# Patient Record
Sex: FEMALE | Race: WHITE | Employment: PART TIME | URBAN - NONMETROPOLITAN AREA
[De-identification: names, ages, dates, MRNs, and addresses within clinical notes are randomized per-mention and may not be internally consistent; named-entity substitution may affect disease eponyms.]

---

## 2017-01-30 RX ORDER — LISINOPRIL 20 MG/1
TABLET ORAL
Qty: 90 TABLET | Refills: 2 | Status: SHIPPED | OUTPATIENT
Start: 2017-01-30 | End: 2018-06-07 | Stop reason: SDUPTHER

## 2017-02-27 RX ORDER — OMEPRAZOLE 20 MG/1
CAPSULE, DELAYED RELEASE ORAL
Qty: 90 CAPSULE | Refills: 0 | Status: SHIPPED | OUTPATIENT
Start: 2017-02-27 | End: 2017-05-26 | Stop reason: SDUPTHER

## 2017-03-22 LAB
ABSOLUTE EOS #: 0.1 K/UL (ref 0–0.4)
ABSOLUTE LYMPH #: 2.4 K/UL (ref 1–4.8)
ABSOLUTE MONO #: 0.4 K/UL (ref 0.1–1.2)
ALBUMIN SERPL-MCNC: 4.5 G/DL (ref 3.5–5.2)
ALBUMIN/GLOBULIN RATIO: 1.6 (ref 1–2.5)
ALP BLD-CCNC: 169 U/L (ref 35–104)
ALT SERPL-CCNC: 29 U/L (ref 5–33)
ANION GAP SERPL CALCULATED.3IONS-SCNC: 18 MMOL/L (ref 9–17)
AST SERPL-CCNC: 21 U/L
BASOPHILS # BLD: 1 % (ref 0–2)
BASOPHILS ABSOLUTE: 0 K/UL (ref 0–0.2)
BILIRUB SERPL-MCNC: 0.26 MG/DL (ref 0.3–1.2)
BUN BLDV-MCNC: 13 MG/DL (ref 8–23)
BUN/CREAT BLD: 11 (ref 9–20)
CALCIUM SERPL-MCNC: 9.2 MG/DL (ref 8.6–10.4)
CARCINOEMBRYONIC ANTIGEN: 1.6 NG/ML
CHLORIDE BLD-SCNC: 97 MMOL/L (ref 98–107)
CO2: 24 MMOL/L (ref 20–31)
CREAT SERPL-MCNC: 1.2 MG/DL (ref 0.5–0.9)
DIFFERENTIAL TYPE: ABNORMAL
EOSINOPHILS RELATIVE PERCENT: 1 % (ref 1–4)
GFR AFRICAN AMERICAN: 55 ML/MIN
GFR NON-AFRICAN AMERICAN: 46 ML/MIN
GFR SERPL CREATININE-BSD FRML MDRD: ABNORMAL ML/MIN/{1.73_M2}
GFR SERPL CREATININE-BSD FRML MDRD: ABNORMAL ML/MIN/{1.73_M2}
GLUCOSE BLD-MCNC: 458 MG/DL (ref 70–99)
HCT VFR BLD CALC: 43.2 % (ref 36–46)
HEMOGLOBIN: 14.4 G/DL (ref 12–16)
LYMPHOCYTES # BLD: 30 % (ref 24–44)
MCH RBC QN AUTO: 26.9 PG (ref 26–34)
MCHC RBC AUTO-ENTMCNC: 33.3 G/DL (ref 31–37)
MCV RBC AUTO: 80.9 FL (ref 80–100)
MONOCYTES # BLD: 6 % (ref 1–7)
PDW BLD-RTO: 13.3 % (ref 11–14.5)
PLATELET # BLD: 251 K/UL (ref 140–450)
PLATELET ESTIMATE: ABNORMAL
PMV BLD AUTO: 8 FL (ref 6–12)
POTASSIUM SERPL-SCNC: 4.1 MMOL/L (ref 3.7–5.3)
RBC # BLD: 5.35 M/UL (ref 4–5.2)
RBC # BLD: ABNORMAL 10*6/UL
SEG NEUTROPHILS: 62 % (ref 36–66)
SEGMENTED NEUTROPHILS ABSOLUTE COUNT: 4.9 K/UL (ref 1.8–7.7)
SODIUM BLD-SCNC: 139 MMOL/L (ref 135–144)
TOTAL PROTEIN: 7.3 G/DL (ref 6.4–8.3)
WBC # BLD: 7.8 K/UL (ref 3.5–11)
WBC # BLD: ABNORMAL 10*3/UL

## 2017-03-23 LAB — CA 15-3: 25 U/ML (ref 0–31)

## 2017-03-27 ENCOUNTER — TELEPHONE (OUTPATIENT)
Dept: FAMILY MEDICINE CLINIC | Age: 62
End: 2017-03-27

## 2017-03-27 DIAGNOSIS — R73.9 ELEVATED BLOOD SUGAR: Primary | ICD-10-CM

## 2017-03-28 ENCOUNTER — TELEPHONE (OUTPATIENT)
Dept: FAMILY MEDICINE CLINIC | Age: 62
End: 2017-03-28

## 2017-03-30 ENCOUNTER — OFFICE VISIT (OUTPATIENT)
Dept: FAMILY MEDICINE CLINIC | Age: 62
End: 2017-03-30
Payer: COMMERCIAL

## 2017-03-30 VITALS
BODY MASS INDEX: 32.33 KG/M2 | HEIGHT: 67 IN | SYSTOLIC BLOOD PRESSURE: 118 MMHG | DIASTOLIC BLOOD PRESSURE: 68 MMHG | WEIGHT: 206 LBS | HEART RATE: 68 BPM

## 2017-03-30 DIAGNOSIS — I10 ESSENTIAL HYPERTENSION: ICD-10-CM

## 2017-03-30 DIAGNOSIS — Z12.11 ENCOUNTER FOR SCREENING COLONOSCOPY: Primary | ICD-10-CM

## 2017-03-30 DIAGNOSIS — E78.2 MIXED HYPERLIPIDEMIA: ICD-10-CM

## 2017-03-30 PROCEDURE — 99214 OFFICE O/P EST MOD 30 MIN: CPT | Performed by: FAMILY MEDICINE

## 2017-03-30 RX ORDER — GLIMEPIRIDE 2 MG/1
2 TABLET ORAL EVERY MORNING
Qty: 30 TABLET | Refills: 3 | Status: SHIPPED | OUTPATIENT
Start: 2017-03-30 | End: 2017-04-11

## 2017-03-30 ASSESSMENT — ENCOUNTER SYMPTOMS
BACK PAIN: 0
RESPIRATORY NEGATIVE: 1
EYES NEGATIVE: 1
GASTROINTESTINAL NEGATIVE: 1
NAUSEA: 0
DIARRHEA: 0
ALLERGIC/IMMUNOLOGIC NEGATIVE: 1
CONSTIPATION: 0

## 2017-03-31 ENCOUNTER — TELEPHONE (OUTPATIENT)
Dept: SURGERY | Age: 62
End: 2017-03-31

## 2017-04-05 ENCOUNTER — TELEPHONE (OUTPATIENT)
Dept: FAMILY MEDICINE CLINIC | Age: 62
End: 2017-04-05

## 2017-04-07 ENCOUNTER — TELEPHONE (OUTPATIENT)
Dept: FAMILY MEDICINE CLINIC | Age: 62
End: 2017-04-07

## 2017-04-07 DIAGNOSIS — E11.9 TYPE 2 DIABETES MELLITUS WITHOUT COMPLICATION, WITHOUT LONG-TERM CURRENT USE OF INSULIN (HCC): Primary | ICD-10-CM

## 2017-04-07 RX ORDER — GLUCOSAMINE HCL/CHONDROITIN SU 500-400 MG
CAPSULE ORAL
Qty: 100 STRIP | Refills: 3 | Status: SHIPPED | OUTPATIENT
Start: 2017-04-07 | End: 2020-01-21 | Stop reason: SDUPTHER

## 2017-04-07 RX ORDER — LANCETS 30 GAUGE
EACH MISCELLANEOUS
Qty: 100 EACH | Refills: 3 | Status: SHIPPED | OUTPATIENT
Start: 2017-04-07 | End: 2020-01-21 | Stop reason: SDUPTHER

## 2017-04-10 RX ORDER — SIMVASTATIN 40 MG
TABLET ORAL
Qty: 90 TABLET | Refills: 1 | Status: SHIPPED | OUTPATIENT
Start: 2017-04-10 | End: 2017-10-07 | Stop reason: SDUPTHER

## 2017-04-11 ENCOUNTER — TELEPHONE (OUTPATIENT)
Dept: FAMILY MEDICINE CLINIC | Age: 62
End: 2017-04-11

## 2017-04-19 DIAGNOSIS — Z80.0 FAMILY HISTORY OF COLON CANCER: Primary | ICD-10-CM

## 2017-04-20 ENCOUNTER — TELEPHONE (OUTPATIENT)
Dept: SURGERY | Age: 62
End: 2017-04-20

## 2017-04-20 RX ORDER — POLYETHYLENE GLYCOL 3350, SODIUM CHLORIDE, SODIUM BICARBONATE, POTASSIUM CHLORIDE 420; 11.2; 5.72; 1.48 G/4L; G/4L; G/4L; G/4L
4000 POWDER, FOR SOLUTION ORAL ONCE
Qty: 4000 ML | Refills: 0 | Status: SHIPPED | OUTPATIENT
Start: 2017-04-20 | End: 2017-04-20

## 2017-05-26 RX ORDER — OMEPRAZOLE 20 MG/1
CAPSULE, DELAYED RELEASE ORAL
Qty: 90 CAPSULE | Refills: 0 | Status: SHIPPED | OUTPATIENT
Start: 2017-05-26 | End: 2017-08-24 | Stop reason: SDUPTHER

## 2017-07-12 LAB
ABSOLUTE EOS #: 0.1 K/UL (ref 0–0.4)
ABSOLUTE LYMPH #: 2.4 K/UL (ref 1–4.8)
ABSOLUTE MONO #: 0.4 K/UL (ref 0.1–1.2)
ALBUMIN SERPL-MCNC: 4.3 G/DL (ref 3.5–5.2)
ALBUMIN/GLOBULIN RATIO: 1.5 (ref 1–2.5)
ALP BLD-CCNC: 111 U/L (ref 35–104)
ALT SERPL-CCNC: 19 U/L (ref 5–33)
ANION GAP SERPL CALCULATED.3IONS-SCNC: 15 MMOL/L (ref 9–17)
AST SERPL-CCNC: 19 U/L
BASOPHILS # BLD: 0 %
BASOPHILS ABSOLUTE: 0 K/UL (ref 0–0.2)
BILIRUB SERPL-MCNC: 0.24 MG/DL (ref 0.3–1.2)
BUN BLDV-MCNC: 16 MG/DL (ref 8–23)
BUN/CREAT BLD: 21 (ref 9–20)
CALCIUM SERPL-MCNC: 9.3 MG/DL (ref 8.6–10.4)
CARCINOEMBRYONIC ANTIGEN: 0.8 NG/ML
CHLORIDE BLD-SCNC: 102 MMOL/L (ref 98–107)
CO2: 27 MMOL/L (ref 20–31)
CREAT SERPL-MCNC: 0.75 MG/DL (ref 0.5–0.9)
DIFFERENTIAL TYPE: NORMAL
EOSINOPHILS RELATIVE PERCENT: 1 %
GFR AFRICAN AMERICAN: >60 ML/MIN
GFR NON-AFRICAN AMERICAN: >60 ML/MIN
GFR SERPL CREATININE-BSD FRML MDRD: ABNORMAL ML/MIN/{1.73_M2}
GFR SERPL CREATININE-BSD FRML MDRD: ABNORMAL ML/MIN/{1.73_M2}
GLUCOSE BLD-MCNC: 140 MG/DL (ref 70–99)
HCT VFR BLD CALC: 40.3 % (ref 36–46)
HEMOGLOBIN: 13.2 G/DL (ref 12–16)
LYMPHOCYTES # BLD: 30 %
MCH RBC QN AUTO: 26.8 PG (ref 26–34)
MCHC RBC AUTO-ENTMCNC: 32.8 G/DL (ref 31–37)
MCV RBC AUTO: 81.7 FL (ref 80–100)
MONOCYTES # BLD: 5 %
PDW BLD-RTO: 13.6 % (ref 11–14.5)
PLATELET # BLD: 239 K/UL (ref 140–450)
PLATELET ESTIMATE: NORMAL
PMV BLD AUTO: 8 FL (ref 6–12)
POTASSIUM SERPL-SCNC: 4.2 MMOL/L (ref 3.7–5.3)
RBC # BLD: 4.93 M/UL (ref 4–5.2)
RBC # BLD: NORMAL 10*6/UL
SEG NEUTROPHILS: 64 %
SEGMENTED NEUTROPHILS ABSOLUTE COUNT: 5.1 K/UL (ref 1.8–7.7)
SODIUM BLD-SCNC: 144 MMOL/L (ref 135–144)
TOTAL PROTEIN: 7.2 G/DL (ref 6.4–8.3)
WBC # BLD: 8.1 K/UL (ref 3.5–11)
WBC # BLD: NORMAL 10*3/UL

## 2017-07-14 LAB — CA 15-3: 16 U/ML (ref 0–31)

## 2017-08-16 ENCOUNTER — HOSPITAL ENCOUNTER (OUTPATIENT)
Dept: LAB | Age: 62
Discharge: HOME | End: 2017-08-16
Payer: COMMERCIAL

## 2017-08-16 DIAGNOSIS — I10 ESSENTIAL HYPERTENSION: ICD-10-CM

## 2017-08-16 LAB
ANION GAP SERPL CALCULATED.3IONS-SCNC: 11 MMOL/L (ref 9–17)
BUN BLDV-MCNC: 16 MG/DL (ref 8–23)
BUN/CREAT BLD: 29 (ref 9–20)
CALCIUM SERPL-MCNC: 9.6 MG/DL (ref 8.6–10.4)
CHLORIDE BLD-SCNC: 103 MMOL/L (ref 98–107)
CO2: 28 MMOL/L (ref 20–31)
CREAT SERPL-MCNC: 0.56 MG/DL (ref 0.5–0.9)
ESTIMATED AVERAGE GLUCOSE: 134 MG/DL
GFR AFRICAN AMERICAN: >60 ML/MIN
GFR NON-AFRICAN AMERICAN: >60 ML/MIN
GFR SERPL CREATININE-BSD FRML MDRD: ABNORMAL ML/MIN/{1.73_M2}
GFR SERPL CREATININE-BSD FRML MDRD: ABNORMAL ML/MIN/{1.73_M2}
GLUCOSE BLD-MCNC: 148 MG/DL (ref 70–99)
HBA1C MFR BLD: 6.3 % (ref 4.8–5.9)
POTASSIUM SERPL-SCNC: 4.2 MMOL/L (ref 3.7–5.3)
SODIUM BLD-SCNC: 142 MMOL/L (ref 135–144)

## 2017-08-16 PROCEDURE — 36415 COLL VENOUS BLD VENIPUNCTURE: CPT

## 2017-08-16 PROCEDURE — 80048 BASIC METABOLIC PNL TOTAL CA: CPT

## 2017-08-16 PROCEDURE — 83036 HEMOGLOBIN GLYCOSYLATED A1C: CPT

## 2017-08-21 ENCOUNTER — OFFICE VISIT (OUTPATIENT)
Dept: FAMILY MEDICINE CLINIC | Age: 62
End: 2017-08-21
Payer: COMMERCIAL

## 2017-08-21 ENCOUNTER — NURSE ONLY (OUTPATIENT)
Dept: LAB | Age: 62
End: 2017-08-21
Payer: COMMERCIAL

## 2017-08-21 VITALS
DIASTOLIC BLOOD PRESSURE: 80 MMHG | WEIGHT: 194.6 LBS | HEIGHT: 67 IN | SYSTOLIC BLOOD PRESSURE: 132 MMHG | BODY MASS INDEX: 30.54 KG/M2 | RESPIRATION RATE: 20 BRPM | HEART RATE: 64 BPM

## 2017-08-21 DIAGNOSIS — E11.9 TYPE 2 DIABETES MELLITUS WITHOUT COMPLICATION, WITHOUT LONG-TERM CURRENT USE OF INSULIN (HCC): Primary | ICD-10-CM

## 2017-08-21 DIAGNOSIS — Z23 NEED FOR SHINGLES VACCINE: ICD-10-CM

## 2017-08-21 DIAGNOSIS — E78.2 MIXED HYPERLIPIDEMIA: ICD-10-CM

## 2017-08-21 DIAGNOSIS — I10 ESSENTIAL HYPERTENSION: ICD-10-CM

## 2017-08-21 PROCEDURE — 90736 HZV VACCINE LIVE SUBQ: CPT | Performed by: FAMILY MEDICINE

## 2017-08-21 PROCEDURE — 99214 OFFICE O/P EST MOD 30 MIN: CPT | Performed by: FAMILY MEDICINE

## 2017-08-21 PROCEDURE — 90471 IMMUNIZATION ADMIN: CPT | Performed by: FAMILY MEDICINE

## 2017-08-21 ASSESSMENT — PATIENT HEALTH QUESTIONNAIRE - PHQ9
2. FEELING DOWN, DEPRESSED OR HOPELESS: 0
SUM OF ALL RESPONSES TO PHQ9 QUESTIONS 1 & 2: 0
1. LITTLE INTEREST OR PLEASURE IN DOING THINGS: 0
SUM OF ALL RESPONSES TO PHQ QUESTIONS 1-9: 0

## 2017-08-21 ASSESSMENT — ENCOUNTER SYMPTOMS
ALLERGIC/IMMUNOLOGIC NEGATIVE: 1
GASTROINTESTINAL NEGATIVE: 1
EYES NEGATIVE: 1
CONSTIPATION: 0
NAUSEA: 0
DIARRHEA: 0
BACK PAIN: 0
RESPIRATORY NEGATIVE: 1

## 2017-08-24 RX ORDER — OMEPRAZOLE 20 MG/1
CAPSULE, DELAYED RELEASE ORAL
Qty: 90 CAPSULE | Refills: 2 | Status: SHIPPED | OUTPATIENT
Start: 2017-08-24 | End: 2020-06-01 | Stop reason: SDUPTHER

## 2017-10-09 RX ORDER — SIMVASTATIN 40 MG
TABLET ORAL
Qty: 90 TABLET | Refills: 1 | Status: SHIPPED | OUTPATIENT
Start: 2017-10-09 | End: 2018-06-07 | Stop reason: SDUPTHER

## 2017-11-29 ENCOUNTER — OFFICE VISIT (OUTPATIENT)
Dept: PRIMARY CARE CLINIC | Age: 62
End: 2017-11-29
Payer: COMMERCIAL

## 2017-11-29 VITALS
HEIGHT: 67 IN | DIASTOLIC BLOOD PRESSURE: 70 MMHG | WEIGHT: 198 LBS | TEMPERATURE: 98.2 F | SYSTOLIC BLOOD PRESSURE: 132 MMHG | HEART RATE: 72 BPM | BODY MASS INDEX: 31.08 KG/M2

## 2017-11-29 DIAGNOSIS — M50.20 CERVICAL DISCOGENIC PAIN SYNDROME: Primary | ICD-10-CM

## 2017-11-29 PROCEDURE — 99213 OFFICE O/P EST LOW 20 MIN: CPT | Performed by: FAMILY MEDICINE

## 2017-11-29 RX ORDER — PREDNISONE 20 MG/1
TABLET ORAL
Qty: 24 TABLET | Refills: 0 | Status: SHIPPED | OUTPATIENT
Start: 2017-11-29 | End: 2018-01-18 | Stop reason: ALTCHOICE

## 2017-11-29 ASSESSMENT — ENCOUNTER SYMPTOMS
EYES NEGATIVE: 1
RESPIRATORY NEGATIVE: 1
ALLERGIC/IMMUNOLOGIC NEGATIVE: 1
GASTROINTESTINAL NEGATIVE: 1

## 2017-11-29 NOTE — PROGRESS NOTES
Subjective:      Patient ID: Khoa Abad is a 58 y.o. female. HPI acute urgent care visit for left arm pain over the last 3 weeks. No recalled injury or initiating event. She had a \"ruptured disc\" in the past with similar symptoms. Pain from neck to shoulder and down the arm. Dull ache, sense of heaviness in the arm. Some tingling in the finger tips of the left hand that comes and goes. Sense of swelling in the upper thoracic spine area. Using aleve without much help. Starting to affect her sleep negatively. Past Medical History:   Diagnosis Date    Cancer [facility]) 2006    Right Breast cancer    DDD (degenerative disc disease)     cervical    GERD (gastroesophageal reflux disease)     Hyperlipidemia     Hypertension     Impaired fasting blood sugar     Kidney stone     Myopia with astigmatism and presbyopia     Obesity     Renal cell carcinoma ([facility] [address] 06/10/13    right kidney: clear cell type. Corine grade 2 of 4. 2.5cm limited to kidney    Stress incontinence      Past Surgical History:   Procedure Laterality Date    BLADDER SUSPENSION  2011    incontinence    BREAST BIOPSY Right 11/20/2007    wire localization     COLONOSCOPY  05/19/2008    COLONOSCOPY  05/03/2017    [id number]    HYSTERECTOMY, TOTAL ABDOMINAL  2011    with bladder lift    MASTECTOMY Bilateral 12/11/2007    lymphnodes were also removed    NECK SURGERY  06/30/2010    cervical 6-7 microdiscectomy. Dr. Lulu Sparks Right 06/10/2013    limited to kidney    TUBAL LIGATION Bilateral 1988    UPPER GASTROINTESTINAL ENDOSCOPY  01/12/10    normal.  Dr. Mimi Steele           Review of Systems   HENT: Negative. Eyes: Negative. Respiratory: Negative. Cardiovascular: Negative. Gastrointestinal: Negative. Endocrine: Negative. Genitourinary: Negative. Musculoskeletal: Positive for myalgias (left posterior shoulder, left arm), neck pain and neck stiffness.

## 2017-12-05 ENCOUNTER — TELEPHONE (OUTPATIENT)
Dept: FAMILY MEDICINE CLINIC | Age: 62
End: 2017-12-05

## 2017-12-05 NOTE — TELEPHONE ENCOUNTER
Pt calling stating she was referred to clinic PT and her ins will not cover, pt states she is scheduled at [facility] and would like referral faxed there at [phone], please notify pt when completed.

## 2017-12-12 ENCOUNTER — TELEPHONE (OUTPATIENT)
Dept: FAMILY MEDICINE CLINIC | Age: 62
End: 2017-12-12

## 2017-12-12 DIAGNOSIS — M54.2 CERVICAL PAIN: Primary | ICD-10-CM

## 2017-12-12 NOTE — TELEPHONE ENCOUNTER
Patient states that she need her referral for PT faxed to the [facility] as soon as possible because she has PT on Thursday and they need to get this approved through insurance. Please fax referral to [phone].  Please notify patient when this is completed

## 2018-01-16 ENCOUNTER — TELEPHONE (OUTPATIENT)
Dept: FAMILY MEDICINE CLINIC | Age: 63
End: 2018-01-16

## 2018-01-16 NOTE — TELEPHONE ENCOUNTER
I dont get much information from the last PT notes from 12/28 from [facility]. Please schedule follow up to discuss, or send me a report of what her current condition is, and what her concerns /goals are. Would be better to examine/discuss in person if possible.

## 2018-01-16 NOTE — TELEPHONE ENCOUNTER
Pt calling stating she had therapy and reports were sent to [facility] to review, pt needs to discuss finding and options as to what she is to do next, please advise at above number.

## 2018-01-18 ENCOUNTER — OFFICE VISIT (OUTPATIENT)
Dept: FAMILY MEDICINE CLINIC | Age: 63
End: 2018-01-18
Payer: COMMERCIAL

## 2018-01-18 ENCOUNTER — HOSPITAL ENCOUNTER (OUTPATIENT)
Dept: GENERAL RADIOLOGY | Age: 63
Discharge: HOME | End: 2018-01-18
Payer: COMMERCIAL

## 2018-01-18 VITALS
HEART RATE: 68 BPM | BODY MASS INDEX: 32.24 KG/M2 | HEIGHT: 67 IN | SYSTOLIC BLOOD PRESSURE: 130 MMHG | RESPIRATION RATE: 16 BRPM | WEIGHT: 205.4 LBS | DIASTOLIC BLOOD PRESSURE: 84 MMHG

## 2018-01-18 DIAGNOSIS — M50.123 CERVICAL DISC DISORDER AT C6-C7 LEVEL WITH RADICULOPATHY: Primary | ICD-10-CM

## 2018-01-18 DIAGNOSIS — M50.123 CERVICAL DISC DISORDER AT C6-C7 LEVEL WITH RADICULOPATHY: ICD-10-CM

## 2018-01-18 PROCEDURE — 99214 OFFICE O/P EST MOD 30 MIN: CPT | Performed by: FAMILY MEDICINE

## 2018-01-18 PROCEDURE — 72040 X-RAY EXAM NECK SPINE 2-3 VW: CPT

## 2018-01-18 RX ORDER — PREDNISONE 50 MG/1
50 TABLET ORAL DAILY
Qty: 5 TABLET | Refills: 0 | Status: SHIPPED | OUTPATIENT
Start: 2018-01-18 | End: 2018-01-23

## 2018-01-18 RX ORDER — METFORMIN HYDROCHLORIDE 750 MG/1
750 TABLET, EXTENDED RELEASE ORAL
Qty: 30 TABLET | Refills: 3 | Status: SHIPPED | OUTPATIENT
Start: 2018-01-18 | End: 2018-02-23 | Stop reason: SDUPTHER

## 2018-01-18 ASSESSMENT — ENCOUNTER SYMPTOMS
RESPIRATORY NEGATIVE: 1
GASTROINTESTINAL NEGATIVE: 1
EYES NEGATIVE: 1
ALLERGIC/IMMUNOLOGIC NEGATIVE: 1

## 2018-01-30 ENCOUNTER — TELEPHONE (OUTPATIENT)
Dept: FAMILY MEDICINE CLINIC | Age: 63
End: 2018-01-30

## 2018-01-30 NOTE — TELEPHONE ENCOUNTER
Dr Valerie Baldwin did there peer to peer and approval obtained   Auth # [id number]- new appointment date and time is Monday, February 5 @ 5:30pm   Please call patient   Thank you!!

## 2018-02-02 ENCOUNTER — HOSPITAL ENCOUNTER (OUTPATIENT)
Dept: MRI IMAGING | Age: 63
Discharge: HOME | End: 2018-02-04
Payer: COMMERCIAL

## 2018-02-02 DIAGNOSIS — M50.123 CERVICAL DISC DISORDER AT C6-C7 LEVEL WITH RADICULOPATHY: ICD-10-CM

## 2018-02-02 DIAGNOSIS — M54.2 CERVICAL PAIN: Primary | ICD-10-CM

## 2018-02-02 PROCEDURE — 72141 MRI NECK SPINE W/O DYE: CPT

## 2018-02-12 ENCOUNTER — TELEPHONE (OUTPATIENT)
Dept: NEUROSURGERY | Age: 63
End: 2018-02-12

## 2018-02-12 DIAGNOSIS — M48.02 STENOSIS OF CERVICAL SPINE WITH MYELOPATHY (HCC): Primary | ICD-10-CM

## 2018-02-12 DIAGNOSIS — G99.2 STENOSIS OF CERVICAL SPINE WITH MYELOPATHY (HCC): Primary | ICD-10-CM

## 2018-02-13 ENCOUNTER — TELEPHONE (OUTPATIENT)
Dept: FAMILY MEDICINE CLINIC | Age: 63
End: 2018-02-13

## 2018-02-20 ENCOUNTER — HOSPITAL ENCOUNTER (OUTPATIENT)
Dept: LAB | Age: 63
Setting detail: SPECIMEN
Discharge: HOME | End: 2018-02-20
Payer: COMMERCIAL

## 2018-02-20 DIAGNOSIS — E78.2 MIXED HYPERLIPIDEMIA: ICD-10-CM

## 2018-02-20 DIAGNOSIS — E11.9 TYPE 2 DIABETES MELLITUS WITHOUT COMPLICATION, WITHOUT LONG-TERM CURRENT USE OF INSULIN (HCC): ICD-10-CM

## 2018-02-20 DIAGNOSIS — I10 ESSENTIAL HYPERTENSION: ICD-10-CM

## 2018-02-20 LAB
ABSOLUTE EOS #: 0.2 K/UL (ref 0–0.4)
ABSOLUTE IMMATURE GRANULOCYTE: ABNORMAL K/UL (ref 0–0.3)
ABSOLUTE LYMPH #: 2.7 K/UL (ref 1–4.8)
ABSOLUTE MONO #: 0.5 K/UL (ref 0.1–1.2)
ANION GAP SERPL CALCULATED.3IONS-SCNC: 14 MMOL/L (ref 9–17)
BASOPHILS # BLD: 1 % (ref 0–1)
BASOPHILS ABSOLUTE: 0 K/UL (ref 0–0.2)
BUN BLDV-MCNC: 15 MG/DL (ref 8–23)
BUN/CREAT BLD: 26 (ref 9–20)
CALCIUM SERPL-MCNC: 9.4 MG/DL (ref 8.6–10.4)
CHLORIDE BLD-SCNC: 100 MMOL/L (ref 98–107)
CHOLESTEROL/HDL RATIO: 4.4
CHOLESTEROL: 194 MG/DL
CO2: 28 MMOL/L (ref 20–31)
CREAT SERPL-MCNC: 0.58 MG/DL (ref 0.5–0.9)
CREATININE URINE: 259 MG/DL (ref 28–217)
DIFFERENTIAL TYPE: ABNORMAL
EOSINOPHILS RELATIVE PERCENT: 3 % (ref 1–7)
ESTIMATED AVERAGE GLUCOSE: 189 MG/DL
GFR AFRICAN AMERICAN: >60 ML/MIN
GFR NON-AFRICAN AMERICAN: >60 ML/MIN
GFR SERPL CREATININE-BSD FRML MDRD: ABNORMAL ML/MIN/{1.73_M2}
GFR SERPL CREATININE-BSD FRML MDRD: ABNORMAL ML/MIN/{1.73_M2}
GLUCOSE BLD-MCNC: 197 MG/DL (ref 70–99)
HBA1C MFR BLD: 8.2 % (ref 4.8–5.9)
HCT VFR BLD CALC: 40.1 % (ref 36–46)
HDLC SERPL-MCNC: 44 MG/DL
HEMOGLOBIN: 14 G/DL (ref 12–16)
IMMATURE GRANULOCYTES: ABNORMAL %
LDL CHOLESTEROL: 112 MG/DL (ref 0–130)
LYMPHOCYTES # BLD: 35 % (ref 16–46)
MCH RBC QN AUTO: 27.7 PG (ref 26–34)
MCHC RBC AUTO-ENTMCNC: 34.8 G/DL (ref 31–37)
MCV RBC AUTO: 79.5 FL (ref 80–100)
MICROALBUMIN/CREAT 24H UR: 26 MG/L
MICROALBUMIN/CREAT UR-RTO: 10 MCG/MG CREAT
MONOCYTES # BLD: 6 % (ref 4–11)
NRBC AUTOMATED: ABNORMAL PER 100 WBC
PDW BLD-RTO: 13.8 % (ref 11–14.5)
PLATELET # BLD: 253 K/UL (ref 140–450)
PLATELET ESTIMATE: ABNORMAL
PMV BLD AUTO: 7.8 FL (ref 6–12)
POTASSIUM SERPL-SCNC: 3.7 MMOL/L (ref 3.7–5.3)
RBC # BLD: 5.04 M/UL (ref 4–5.2)
RBC # BLD: ABNORMAL 10*6/UL
SEG NEUTROPHILS: 55 % (ref 43–77)
SEGMENTED NEUTROPHILS ABSOLUTE COUNT: 4.2 K/UL (ref 1.8–7.7)
SODIUM BLD-SCNC: 142 MMOL/L (ref 135–144)
TRIGL SERPL-MCNC: 189 MG/DL
VLDLC SERPL CALC-MCNC: ABNORMAL MG/DL (ref 1–30)
WBC # BLD: 7.7 K/UL (ref 3.5–11)
WBC # BLD: ABNORMAL 10*3/UL

## 2018-02-20 PROCEDURE — 80048 BASIC METABOLIC PNL TOTAL CA: CPT

## 2018-02-20 PROCEDURE — 36415 COLL VENOUS BLD VENIPUNCTURE: CPT

## 2018-02-20 PROCEDURE — 82043 UR ALBUMIN QUANTITATIVE: CPT

## 2018-02-20 PROCEDURE — 80061 LIPID PANEL: CPT

## 2018-02-20 PROCEDURE — 83036 HEMOGLOBIN GLYCOSYLATED A1C: CPT

## 2018-02-20 PROCEDURE — 85025 COMPLETE CBC W/AUTO DIFF WBC: CPT

## 2018-02-20 PROCEDURE — 82570 ASSAY OF URINE CREATININE: CPT

## 2018-02-23 ENCOUNTER — OFFICE VISIT (OUTPATIENT)
Dept: FAMILY MEDICINE CLINIC | Age: 63
End: 2018-02-23
Payer: COMMERCIAL

## 2018-02-23 VITALS
WEIGHT: 205 LBS | SYSTOLIC BLOOD PRESSURE: 120 MMHG | HEART RATE: 72 BPM | HEIGHT: 67 IN | BODY MASS INDEX: 32.18 KG/M2 | DIASTOLIC BLOOD PRESSURE: 72 MMHG

## 2018-02-23 DIAGNOSIS — E66.09 CLASS 1 OBESITY DUE TO EXCESS CALORIES WITH SERIOUS COMORBIDITY AND BODY MASS INDEX (BMI) OF 32.0 TO 32.9 IN ADULT: ICD-10-CM

## 2018-02-23 DIAGNOSIS — E78.2 MIXED HYPERLIPIDEMIA: ICD-10-CM

## 2018-02-23 DIAGNOSIS — I10 ESSENTIAL HYPERTENSION: ICD-10-CM

## 2018-02-23 DIAGNOSIS — M54.2 CERVICAL PAIN: ICD-10-CM

## 2018-02-23 DIAGNOSIS — E11.9 TYPE 2 DIABETES MELLITUS WITHOUT COMPLICATION, WITHOUT LONG-TERM CURRENT USE OF INSULIN (HCC): Primary | ICD-10-CM

## 2018-02-23 PROCEDURE — 99214 OFFICE O/P EST MOD 30 MIN: CPT | Performed by: FAMILY MEDICINE

## 2018-02-23 RX ORDER — METFORMIN HYDROCHLORIDE 750 MG/1
750 TABLET, EXTENDED RELEASE ORAL
Qty: 30 TABLET | Refills: 3 | Status: ON HOLD | OUTPATIENT
Start: 2018-02-23 | End: 2018-05-13 | Stop reason: HOSPADM

## 2018-02-23 RX ORDER — GLIMEPIRIDE 2 MG/1
2 TABLET ORAL EVERY MORNING
Qty: 30 TABLET | Refills: 6 | Status: SHIPPED | OUTPATIENT
Start: 2018-02-23 | End: 2018-06-07 | Stop reason: ALTCHOICE

## 2018-02-23 ASSESSMENT — ENCOUNTER SYMPTOMS
BACK PAIN: 0
EYES NEGATIVE: 1
NAUSEA: 0
RESPIRATORY NEGATIVE: 1
CONSTIPATION: 0
GASTROINTESTINAL NEGATIVE: 1
ALLERGIC/IMMUNOLOGIC NEGATIVE: 1
DIARRHEA: 0

## 2018-02-23 NOTE — PATIENT INSTRUCTIONS
average adult body mass. Additional eGFR calculator   available at:        [email or web address]        Performed at [facility] [address] [facility] [phone]      GFR Staging 02/20/2018 NOT REPORTED   Final    WBC 02/20/2018 7.7  3.5 - 11.0 k/uL Final    RBC 02/20/2018 5.04  4.0 - 5.2 m/uL Final    Hemoglobin 02/20/2018 14.0  12.0 - 16.0 g/dL Final    Hematocrit 02/20/2018 40.1  36 - 46 % Final    MCV 02/20/2018 79.5* 80 - 100 fL Final    MCH 02/20/2018 27.7  26 - 34 pg Final    MCHC 02/20/2018 34.8  31 - 37 g/dL Final    RDW 02/20/2018 13.8  11.0 - 14.5 % Final    Platelets 85/63/8094 253  140 - 450 k/uL Final    MPV 02/20/2018 7.8  6.0 - 12.0 fL Final    NRBC Automated 02/20/2018 NOT REPORTED  per 100 WBC Final    Differential Type 02/20/2018 NOT REPORTED   Final    Immature Granulocytes 02/20/2018 NOT REPORTED  0 % Final    Absolute Immature Granulocyte 02/20/2018 NOT REPORTED  0.00 - 0.30 k/uL Final    WBC Morphology 02/20/2018 NOT REPORTED   Final    RBC Morphology 02/20/2018 NOT REPORTED   Final    Platelet Estimate 82[birth year]1918 NOT REPORTED   Final    Seg Neutrophils 02/20/2018 55  43 - 77 % Final    Lymphocytes 02/20/2018 35  16 - 46 % Final    Monocytes 02/20/2018 6  4 - 11 % Final    Eosinophils % 02/20/2018 3  1 - 7 % Final    Basophils 02/20/2018 1  0 - 1 % Final    Segs Absolute 02/20/2018 4.20  1.8 - 7.7 k/uL Final    Absolute Lymph # 02/20/2018 2.70  1.0 - 4.8 k/uL Final    Absolute Mono # 02/20/2018 0.50  0.1 - 1.2 k/uL Final    Absolute Eos # 02/20/2018 0.20  0.0 - 0.4 k/uL Final    Basophils # 02/20/2018 0.00  0.0 - 0.2 k/uL Final    Comment: Performed at [facility] [address] [address] [phone]      Hemoglobin A1C 02/20/2018 8.2* 4.8 - 5.9 % Final    Estimated Avg Glucose 02/20/2018 189  mg/dL Final    Comment: Performed at [facility] [address] [address] [phone], Ur 02/20/2018 26* <21 mg/L Final    Creatinine, Ur 02/20/2018 259.0* 28.0 - 217.0 mg/dL Final    Microalb/Crt. Ratio 02/20/2018 10  <25 mcg/mg creat Final    Comment: Performed at [facility] [address] [address] [phone]

## 2018-02-23 NOTE — PROGRESS NOTES
Subjective:      Patient ID: Mohsen Shukla is a 58 y.o. female. Diabetes   Pertinent negatives for diabetes include no chest pain and no fatigue. Hypertension   Associated symptoms include neck pain (radicular pain into left arm). Pertinent negatives include no chest pain. Routine follow up on htn and diabetes. Increased lisinopril to 20mg/day and metformin started for elevated sugars. Checking blood sugars daily now. 140-260 readings. bp better on outpt. Checks. 197 range systolic. No acute concerns. Recent follow up with her oncologist Dr. Zuleika Velasco, with all being well. No acute concerns. Trying to do better on diet as well. She has persistent cervicogenic pain down the left arm failing conservative care. She has consult with neurosurgery coming up. Still having discomfort at present. Blood sugars remain a little on the high side. We increased metformin twice, but she had diarrhea too much at 1000mg. She has had some prednisone bursts for her neck issues. Past Medical History:   Diagnosis Date    Cancer [facility]) 2006    Right Breast cancer    DDD (degenerative disc disease)     cervical    GERD (gastroesophageal reflux disease)     Hyperlipidemia     Hypertension     Impaired fasting blood sugar     Kidney stone     Myopia with astigmatism and presbyopia     Obesity     Renal cell carcinoma ([facility] [address] 06/10/13    right kidney: clear cell type. Corine grade 2 of 4. 2.5cm limited to kidney    Stress incontinence      Past Surgical History:   Procedure Laterality Date    BLADDER SUSPENSION  2011    incontinence    BREAST BIOPSY Right 11/20/2007    wire localization     COLONOSCOPY  05/19/2008    COLONOSCOPY  05/03/2017    [id number]    HYSTERECTOMY, TOTAL ABDOMINAL  2011    with bladder lift    MASTECTOMY Bilateral 12/11/2007    lymphnodes were also removed    NECK SURGERY  06/30/2010    cervical 6-7 microdiscectomy.  Dr. Genia Sanders Right 06/10/2013    limited to kidney    TUBAL LIGATION Bilateral 1988    UPPER GASTROINTESTINAL ENDOSCOPY  01/12/10    normal.  Dr. Adrienne Tovar EXTRACTION       Current Outpatient Prescriptions   Medication Sig Dispense Refill    metFORMIN (GLUCOPHAGE XR) 750 MG extended release tablet Take 1 tablet by mouth daily (with breakfast) 30 tablet 3    metoprolol tartrate (LOPRESSOR) 25 MG tablet TAKE 1 TABLET TWICE A  tablet 1    simvastatin (ZOCOR) 40 MG tablet TAKE 1 TABLET NIGHTLY 90 tablet 1    omeprazole (PRILOSEC) 20 MG delayed release capsule TAKE 1 CAPSULE DAILY 90 capsule 2    Blood Glucose Monitoring Suppl ALICIA Monitor blood glucose levels once daily and as needed for hyper/hypoglycemic symptoms. Dx. Diabetes (E11.9) 1 Device 0    Glucose Blood (BLOOD GLUCOSE TEST STRIPS) STRP Monitor blood glucose levels once daily and as needed for hyper/hypoglycemic symptoms. Dx. Diabetes (E11.9) 100 strip 3    Lancets MISC Monitor blood glucose levels once daily and as needed for hyper/hypoglycemic symptoms. Dx. Diabetes (E11.9) 100 each 3    lisinopril (PRINIVIL;ZESTRIL) 20 MG tablet TAKE 1 TABLET DAILY 90 tablet 2    nitroGLYCERIN (NITROSTAT) 0.4 MG SL tablet Place 1 tablet under the tongue every 5 minutes as needed for Chest pain 25 tablet 0    aspirin 81 MG tablet Take 81 mg by mouth daily. 2 to 3 times a week      Multiple Vitamin (MULTI-VITAMIN DAILY PO) Take 1 tablet by mouth. No current facility-administered medications for this visit. No Known Allergies      Review of Systems   Constitutional: Negative. Negative for fatigue and unexpected weight change. HENT: Negative. Eyes: Negative. Respiratory: Negative. Cardiovascular: Negative. Negative for chest pain. Gastrointestinal: Negative. Negative for constipation, diarrhea and nausea. Endocrine: Negative. Genitourinary: Negative. Negative for flank pain.    Musculoskeletal: Positive for neck pain (radicular pain into left arm). Negative for back pain and neck stiffness. Skin: Negative. Allergic/Immunologic: Negative. Neurological: Negative. Hematological: Negative. Psychiatric/Behavioral: Negative. Objective:   Physical Exam   Constitutional: She is oriented to person, place, and time. She appears well-developed and well-nourished. No distress. HENT:   Head: Normocephalic and atraumatic. Right Ear: External ear normal.   Left Ear: External ear normal.   Mouth/Throat: Oropharynx is clear and moist. No oropharyngeal exudate. Eyes: Conjunctivae and EOM are normal. No scleral icterus. Neck: Neck supple. No thyromegaly present. Cardiovascular: Normal rate, regular rhythm, normal heart sounds and intact distal pulses. No murmur heard. Pulmonary/Chest: Effort normal and breath sounds normal. No respiratory distress. She has no wheezes. Abdominal: Soft. Bowel sounds are normal. She exhibits no distension. There is no tenderness. There is no rebound. Musculoskeletal: Normal range of motion. She exhibits no edema or tenderness. Neurological: She is alert and oriented to person, place, and time. Skin: Skin is warm and dry. No rash noted. No erythema. Psychiatric: She has a normal mood and affect.  Her behavior is normal. Judgment and thought content normal.     /72 (Site: Right Arm, Position: Sitting, Cuff Size: Large Adult)   Pulse 72   Ht 5' 7.01\" (1.702 m)   Wt 205 lb (93 kg)   LMP  (LMP Unknown)   BMI 32.10 kg/m²    Up 11 lbs      Results for orders placed or performed during the hospital encounter of 02/20/18   Lipid Panel   Result Value Ref Range    Cholesterol 194 <200 mg/dL    HDL 44 >40 mg/dL    LDL Cholesterol 112 0 - 130 mg/dL    Chol/HDL Ratio 4.4 <5    Triglycerides 189 (H) <150 mg/dL    VLDL NOT REPORTED 1 - 30 mg/dL   Basic Metabolic Panel   Result Value Ref Range    Glucose 197 (H) 70 - 99 mg/dL    BUN 15 8 - 23 mg/dL    CREATININE 0.58 0.50 - 0.90 mg/dL Bun/Cre Ratio 26 (H) 9 - 20    Calcium 9.4 8.6 - 10.4 mg/dL    Sodium 142 135 - 144 mmol/L    Potassium 3.7 3.7 - 5.3 mmol/L    Chloride 100 98 - 107 mmol/L    CO2 28 20 - 31 mmol/L    Anion Gap 14 9 - 17 mmol/L    GFR Non-African American >60 >60 mL/min    GFR African American >60 >60 mL/min    GFR Comment          GFR Staging NOT REPORTED    CBC Auto Differential   Result Value Ref Range    WBC 7.7 3.5 - 11.0 k/uL    RBC 5.04 4.0 - 5.2 m/uL    Hemoglobin 14.0 12.0 - 16.0 g/dL    Hematocrit 40.1 36 - 46 %    MCV 79.5 (L) 80 - 100 fL    MCH 27.7 26 - 34 pg    MCHC 34.8 31 - 37 g/dL    RDW 13.8 11.0 - 14.5 %    Platelets 729 162 - 557 k/uL    MPV 7.8 6.0 - 12.0 fL    NRBC Automated NOT REPORTED per 100 WBC    Differential Type NOT REPORTED     Immature Granulocytes NOT REPORTED 0 %    Absolute Immature Granulocyte NOT REPORTED 0.00 - 0.30 k/uL    WBC Morphology NOT REPORTED     RBC Morphology NOT REPORTED     Platelet Estimate NOT REPORTED     Seg Neutrophils 55 43 - 77 %    Lymphocytes 35 16 - 46 %    Monocytes 6 4 - 11 %    Eosinophils % 3 1 - 7 %    Basophils 1 0 - 1 %    Segs Absolute 4.20 1.8 - 7.7 k/uL    Absolute Lymph # 2.70 1.0 - 4.8 k/uL    Absolute Mono # 0.50 0.1 - 1.2 k/uL    Absolute Eos # 0.20 0.0 - 0.4 k/uL    Basophils # 0.00 0.0 - 0.2 k/uL   Hemoglobin A1C   Result Value Ref Range    Hemoglobin A1C 8.2 (H) 4.8 - 5.9 %    Estimated Avg Glucose 189 mg/dL   Microalbumin, Ur   Result Value Ref Range    Microalb, Ur 26 (H) <21 mg/L    Creatinine, Ur 259.0 (H) 28.0 - 217.0 mg/dL    Microalb/Crt.  Ratio 10 <25 mcg/mg creat     FINDINGS:   BONES/ALIGNMENT: Vertebral heights and alignment are normal.  Bone marrow   signal is benign.  There is solid osseous fusion following anterior   discectomy at C6-C7.  A benign hemangioma is in T2.       SPINAL CORD: No abnormal cord signal is seen.       SOFT TISSUES: No paraspinal mass identified.       C2-C3: There is no significant disc protrusion, spinal canal stenosis or   neural foraminal narrowing.       C3-C4: Disc osteophyte complex and facet hypertrophy cause mild thecal sac   and moderate left foraminal stenosis.       C4-C5: A disc osteophyte complex and uncovertebral hypertrophy cause mild   thecal sac, moderate bilateral foraminal stenosis.       C5-C6: A disc osteophyte complex causes mild cord compression without cord   signal abnormality and severe bilateral foraminal stenosis.       C6-C7: There is no significant disc protrusion, spinal canal stenosis or   neural foraminal narrowing.       C7-T1: There is no significant disc protrusion, spinal canal stenosis or   neural foraminal narrowing.       Degenerative findings are similar to 2013.           Impression   No significant change since 2013.       C5-C6 severe spinal canal stenosis.             Assessment:       Encounter Diagnoses   Name Primary?  Type 2 diabetes mellitus without complication, without long-term current use of insulin ([facility]) Yes    Essential hypertension     Mixed hyperlipidemia     Class 1 obesity due to excess calories with serious comorbidity and body mass index (BMI) of 32.0 to 32.9 in adult     Cervical pain            Plan:    diabetes\" worse. She had been  much improved over prior interval with a1c going from 9% down to 6.3%. Now back up to 8.2%  We increased metformin to 1000mg bid with meals and added  amaryl 2 mg/day. She had too much diarrhea on 1000 mg, currently on 750mg xr daily. She had 2 rounds of prednisone for her neck issues likely contributing to the elevations. Not taking amaryl at this time. Rec. We restart the amaryl at 2mg /day and work harder on the diet and wt. Loss again. Recheck at 3 months. Htn: increased lisinopril to 20mg/day last Feb. , improved control at present. Will cont current dosing and recheck serially. Hyperlipidemia: started simvastatin 40mg after last hospitalization. LDL at 154-->112, hdl 44, triglycerides 118. Cont. Current dosing and recheck at follow up. Wt. up 11 lbs. Needing to get back on the  Wt. Loss plan with improved diet. Chronic cervicogenic pain on the left side radiating to the left arm, s/p prior fusion and C6-7. Mri with mild spinal stenosis at C5-6 and bilateral foraminal narrowing. She has neurosurgery consult coming up.

## 2018-03-02 ENCOUNTER — INITIAL CONSULT (OUTPATIENT)
Dept: NEUROSURGERY | Age: 63
End: 2018-03-02
Payer: COMMERCIAL

## 2018-03-02 VITALS
DIASTOLIC BLOOD PRESSURE: 87 MMHG | WEIGHT: 205 LBS | SYSTOLIC BLOOD PRESSURE: 135 MMHG | HEIGHT: 67 IN | BODY MASS INDEX: 32.18 KG/M2

## 2018-03-02 DIAGNOSIS — M48.02 STENOSIS OF CERVICAL SPINE WITH MYELOPATHY (HCC): Primary | ICD-10-CM

## 2018-03-02 DIAGNOSIS — M54.12 CERVICAL RADICULOPATHY: ICD-10-CM

## 2018-03-02 DIAGNOSIS — G99.2 STENOSIS OF CERVICAL SPINE WITH MYELOPATHY (HCC): Primary | ICD-10-CM

## 2018-03-02 PROCEDURE — 99203 OFFICE O/P NEW LOW 30 MIN: CPT | Performed by: NEUROLOGICAL SURGERY

## 2018-03-02 NOTE — PROGRESS NOTES
level as well that is contributing to the stenosis and myelopathic symptoms. I will obtain a flexion extension x-ray to assess for any type of listhesis or dynamic instability. In addition I would like to obtain an EMG to confirm whether or not there is any particular C5 or C6 radiculopathy present that is overlying. I explained the patient the Smita Farley has a single level fusion so if we did do C4 5 and C5 6 she would now have a 3 level ACDF. This is significant because this would limit her motion approximately 50% of where she is currently so I would like to confirm that she really needs that C4 5 level done and truly has a radiculopathy that correlates. The patient acknowledged these things and is willing to undergo the tests. Followup: No Follow-up on file. Prescriptions Ordered:  No orders of the defined types were placed in this encounter. Orders Placed:  No orders of the defined types were placed in this encounter. Electronically signed by Sami Phillips DO on 3/2/2018 at 10:07 AM    Please note that this chart was generated using voice recognition Dragon dictation software. Although every effort was made to ensure the accuracy of this automated transcription, some errors in transcription may have occurred.

## 2018-03-21 ENCOUNTER — HOSPITAL ENCOUNTER (OUTPATIENT)
Dept: NEUROLOGY | Age: 63
Discharge: HOME | End: 2018-03-21
Payer: COMMERCIAL

## 2018-03-21 DIAGNOSIS — M54.12 CERVICAL RADICULOPATHY: ICD-10-CM

## 2018-03-21 PROCEDURE — 95886 MUSC TEST DONE W/N TEST COMP: CPT

## 2018-03-21 PROCEDURE — 95912 NRV CNDJ TEST 11-12 STUDIES: CPT

## 2018-03-21 NOTE — PROGRESS NOTES
EMG/NCS Bilateral Uppers Completed    PCP: Prabha Napier MD    Ordering: Sherman Stanley DO    Interpreting Physician: Aubree Guardado.  [facility] Neurologist    Technician: Antony Crystal LPN

## 2018-04-06 ENCOUNTER — OFFICE VISIT (OUTPATIENT)
Dept: NEUROSURGERY | Age: 63
End: 2018-04-06
Payer: COMMERCIAL

## 2018-04-06 ENCOUNTER — HOSPITAL ENCOUNTER (OUTPATIENT)
Dept: GENERAL RADIOLOGY | Age: 63
Discharge: HOME | End: 2018-04-08
Payer: COMMERCIAL

## 2018-04-06 VITALS — WEIGHT: 207 LBS | BODY MASS INDEX: 32.42 KG/M2 | SYSTOLIC BLOOD PRESSURE: 140 MMHG | DIASTOLIC BLOOD PRESSURE: 85 MMHG

## 2018-04-06 DIAGNOSIS — M54.12 CERVICAL RADICULOPATHY: ICD-10-CM

## 2018-04-06 DIAGNOSIS — G99.2 STENOSIS OF CERVICAL SPINE WITH MYELOPATHY (HCC): ICD-10-CM

## 2018-04-06 DIAGNOSIS — G99.2 STENOSIS OF CERVICAL SPINE WITH MYELOPATHY (HCC): Primary | ICD-10-CM

## 2018-04-06 DIAGNOSIS — M48.02 STENOSIS OF CERVICAL SPINE WITH MYELOPATHY (HCC): Primary | ICD-10-CM

## 2018-04-06 DIAGNOSIS — M48.02 STENOSIS OF CERVICAL SPINE WITH MYELOPATHY (HCC): ICD-10-CM

## 2018-04-06 PROCEDURE — 72040 X-RAY EXAM NECK SPINE 2-3 VW: CPT

## 2018-04-06 PROCEDURE — 99213 OFFICE O/P EST LOW 20 MIN: CPT | Performed by: NEUROLOGICAL SURGERY

## 2018-04-11 ENCOUNTER — HOSPITAL ENCOUNTER (OUTPATIENT)
Dept: MRI IMAGING | Age: 63
Discharge: HOME | End: 2018-04-13
Payer: COMMERCIAL

## 2018-04-11 ENCOUNTER — TELEPHONE (OUTPATIENT)
Dept: NEUROLOGY | Age: 63
End: 2018-04-11

## 2018-04-11 DIAGNOSIS — M54.12 CERVICAL RADICULOPATHY: ICD-10-CM

## 2018-04-11 PROCEDURE — 73221 MRI JOINT UPR EXTREM W/O DYE: CPT

## 2018-04-12 ENCOUNTER — TELEPHONE (OUTPATIENT)
Dept: NEUROSURGERY | Age: 63
End: 2018-04-12

## 2018-04-12 DIAGNOSIS — M19.011 ARTHROPATHY OF RIGHT SHOULDER: Primary | ICD-10-CM

## 2018-04-12 DIAGNOSIS — M48.061 SPINAL STENOSIS OF LUMBAR REGION, UNSPECIFIED WHETHER NEUROGENIC CLAUDICATION PRESENT: Primary | ICD-10-CM

## 2018-04-25 ENCOUNTER — TELEPHONE (OUTPATIENT)
Dept: NEUROSURGERY | Age: 63
End: 2018-04-25

## 2018-05-04 ENCOUNTER — OFFICE VISIT (OUTPATIENT)
Dept: NEUROSURGERY | Age: 63
End: 2018-05-04
Payer: COMMERCIAL

## 2018-05-04 ENCOUNTER — TELEPHONE (OUTPATIENT)
Dept: NEUROSURGERY | Age: 63
End: 2018-05-04

## 2018-05-04 VITALS — SYSTOLIC BLOOD PRESSURE: 139 MMHG | DIASTOLIC BLOOD PRESSURE: 80 MMHG | WEIGHT: 205 LBS | BODY MASS INDEX: 32.11 KG/M2

## 2018-05-04 DIAGNOSIS — Z01.818 PRE-OP TESTING: Primary | ICD-10-CM

## 2018-05-04 DIAGNOSIS — M54.12 CERVICAL RADICULOPATHY: Primary | ICD-10-CM

## 2018-05-04 DIAGNOSIS — M48.02 STENOSIS OF CERVICAL SPINE WITH MYELOPATHY (HCC): ICD-10-CM

## 2018-05-04 DIAGNOSIS — G99.2 STENOSIS OF CERVICAL SPINE WITH MYELOPATHY (HCC): ICD-10-CM

## 2018-05-04 PROCEDURE — 99214 OFFICE O/P EST MOD 30 MIN: CPT | Performed by: NEUROLOGICAL SURGERY

## 2018-05-10 ENCOUNTER — HOSPITAL ENCOUNTER (OUTPATIENT)
Dept: NON INVASIVE DIAGNOSTICS | Age: 63
Discharge: HOME | End: 2018-05-10
Payer: COMMERCIAL

## 2018-05-10 ENCOUNTER — HOSPITAL ENCOUNTER (OUTPATIENT)
Dept: LAB | Age: 63
Setting detail: SPECIMEN
Discharge: HOME | End: 2018-05-10
Payer: COMMERCIAL

## 2018-05-10 ENCOUNTER — HOSPITAL ENCOUNTER (OUTPATIENT)
Dept: GENERAL RADIOLOGY | Age: 63
Discharge: HOME | End: 2018-05-12
Payer: COMMERCIAL

## 2018-05-10 DIAGNOSIS — E11.9 TYPE 2 DIABETES MELLITUS WITHOUT COMPLICATION, WITHOUT LONG-TERM CURRENT USE OF INSULIN (HCC): ICD-10-CM

## 2018-05-10 DIAGNOSIS — Z01.818 PRE-OP TESTING: ICD-10-CM

## 2018-05-10 LAB
ABSOLUTE EOS #: 0.2 K/UL (ref 0–0.4)
ABSOLUTE IMMATURE GRANULOCYTE: NORMAL K/UL (ref 0–0.3)
ABSOLUTE LYMPH #: 2.6 K/UL (ref 1–4.8)
ABSOLUTE MONO #: 0.5 K/UL (ref 0.1–1.2)
ANION GAP SERPL CALCULATED.3IONS-SCNC: 10 MMOL/L (ref 9–17)
ANION GAP SERPL CALCULATED.3IONS-SCNC: 10 MMOL/L (ref 9–17)
BASOPHILS # BLD: 1 % (ref 0–1)
BASOPHILS ABSOLUTE: 0 K/UL (ref 0–0.2)
BUN BLDV-MCNC: 17 MG/DL (ref 8–23)
BUN BLDV-MCNC: 17 MG/DL (ref 8–23)
BUN/CREAT BLD: 24 (ref 9–20)
BUN/CREAT BLD: 24 (ref 9–20)
CALCIUM SERPL-MCNC: 9.3 MG/DL (ref 8.6–10.4)
CALCIUM SERPL-MCNC: 9.3 MG/DL (ref 8.6–10.4)
CHLORIDE BLD-SCNC: 102 MMOL/L (ref 98–107)
CHLORIDE BLD-SCNC: 102 MMOL/L (ref 98–107)
CO2: 29 MMOL/L (ref 20–31)
CO2: 29 MMOL/L (ref 20–31)
CREAT SERPL-MCNC: 0.7 MG/DL (ref 0.5–0.9)
CREAT SERPL-MCNC: 0.7 MG/DL (ref 0.5–0.9)
DIFFERENTIAL TYPE: NORMAL
EKG ATRIAL RATE: 62 BPM
EKG P AXIS: 63 DEGREES
EKG P-R INTERVAL: 236 MS
EKG Q-T INTERVAL: 416 MS
EKG QRS DURATION: 90 MS
EKG QTC CALCULATION (BAZETT): 422 MS
EKG R AXIS: 59 DEGREES
EKG T AXIS: 79 DEGREES
EKG VENTRICULAR RATE: 62 BPM
EOSINOPHILS RELATIVE PERCENT: 2 % (ref 1–7)
ESTIMATED AVERAGE GLUCOSE: 143 MG/DL
GFR AFRICAN AMERICAN: >60 ML/MIN
GFR AFRICAN AMERICAN: >60 ML/MIN
GFR NON-AFRICAN AMERICAN: >60 ML/MIN
GFR NON-AFRICAN AMERICAN: >60 ML/MIN
GFR SERPL CREATININE-BSD FRML MDRD: ABNORMAL ML/MIN/{1.73_M2}
GLUCOSE BLD-MCNC: 143 MG/DL (ref 70–99)
GLUCOSE BLD-MCNC: 143 MG/DL (ref 70–99)
HBA1C MFR BLD: 6.6 % (ref 4.8–5.9)
HCT VFR BLD CALC: 39.9 % (ref 36–46)
HEMOGLOBIN: 13.7 G/DL (ref 12–16)
IMMATURE GRANULOCYTES: NORMAL %
INR BLD: 0.9
LYMPHOCYTES # BLD: 33 % (ref 16–46)
MCH RBC QN AUTO: 27.6 PG (ref 26–34)
MCHC RBC AUTO-ENTMCNC: 34.4 G/DL (ref 31–37)
MCV RBC AUTO: 80.3 FL (ref 80–100)
MONOCYTES # BLD: 6 % (ref 4–11)
NRBC AUTOMATED: NORMAL PER 100 WBC
PARTIAL THROMBOPLASTIN TIME: 27.7 SEC (ref 27–35)
PDW BLD-RTO: 13.6 % (ref 11–14.5)
PLATELET # BLD: 244 K/UL (ref 140–450)
PLATELET ESTIMATE: NORMAL
PMV BLD AUTO: 8.3 FL (ref 6–12)
POTASSIUM SERPL-SCNC: 4.1 MMOL/L (ref 3.7–5.3)
POTASSIUM SERPL-SCNC: 4.1 MMOL/L (ref 3.7–5.3)
PROTHROMBIN TIME: 9.8 SEC (ref 9.4–11.3)
RBC # BLD: 4.97 M/UL (ref 4–5.2)
RBC # BLD: NORMAL 10*6/UL
SEG NEUTROPHILS: 58 % (ref 43–77)
SEGMENTED NEUTROPHILS ABSOLUTE COUNT: 4.5 K/UL (ref 1.8–7.7)
SODIUM BLD-SCNC: 141 MMOL/L (ref 135–144)
SODIUM BLD-SCNC: 141 MMOL/L (ref 135–144)
WBC # BLD: 7.8 K/UL (ref 3.5–11)
WBC # BLD: NORMAL 10*3/UL

## 2018-05-10 PROCEDURE — 71046 X-RAY EXAM CHEST 2 VIEWS: CPT

## 2018-05-10 PROCEDURE — 85610 PROTHROMBIN TIME: CPT

## 2018-05-10 PROCEDURE — 83036 HEMOGLOBIN GLYCOSYLATED A1C: CPT

## 2018-05-10 PROCEDURE — 85025 COMPLETE CBC W/AUTO DIFF WBC: CPT

## 2018-05-10 PROCEDURE — 80048 BASIC METABOLIC PNL TOTAL CA: CPT

## 2018-05-10 PROCEDURE — 85730 THROMBOPLASTIN TIME PARTIAL: CPT

## 2018-05-10 PROCEDURE — 36415 COLL VENOUS BLD VENIPUNCTURE: CPT

## 2018-05-10 PROCEDURE — 93005 ELECTROCARDIOGRAM TRACING: CPT

## 2018-05-11 ENCOUNTER — APPOINTMENT (OUTPATIENT)
Dept: GENERAL RADIOLOGY | Age: 63
DRG: 473 | End: 2018-05-11
Attending: NEUROLOGICAL SURGERY
Payer: COMMERCIAL

## 2018-05-11 ENCOUNTER — HOSPITAL ENCOUNTER (INPATIENT)
Age: 63
LOS: 2 days | Discharge: HOME | DRG: 473 | End: 2018-05-13
Attending: NEUROLOGICAL SURGERY | Admitting: NEUROLOGICAL SURGERY
Payer: COMMERCIAL

## 2018-05-11 ENCOUNTER — ANESTHESIA EVENT (OUTPATIENT)
Dept: OPERATING ROOM | Age: 63
DRG: 473 | End: 2018-05-11
Payer: COMMERCIAL

## 2018-05-11 ENCOUNTER — ANESTHESIA (OUTPATIENT)
Dept: OPERATING ROOM | Age: 63
DRG: 473 | End: 2018-05-11
Payer: COMMERCIAL

## 2018-05-11 VITALS — OXYGEN SATURATION: 100 % | TEMPERATURE: 98.2 F | SYSTOLIC BLOOD PRESSURE: 121 MMHG | DIASTOLIC BLOOD PRESSURE: 73 MMHG

## 2018-05-11 DIAGNOSIS — M50.90 CERVICAL DISC DISORDER: Primary | ICD-10-CM

## 2018-05-11 LAB
GLUCOSE BLD-MCNC: 115 MG/DL (ref 65–105)
GLUCOSE BLD-MCNC: 95 MG/DL (ref 65–105)

## 2018-05-11 PROCEDURE — 7100000000 HC PACU RECOVERY - FIRST 15 MIN: Performed by: NEUROLOGICAL SURGERY

## 2018-05-11 PROCEDURE — 1200000000 HC SEMI PRIVATE

## 2018-05-11 PROCEDURE — L8699 PROSTHETIC IMPLANT NOS: HCPCS | Performed by: NEUROLOGICAL SURGERY

## 2018-05-11 PROCEDURE — 3600000004 HC SURGERY LEVEL 4 BASE: Performed by: NEUROLOGICAL SURGERY

## 2018-05-11 PROCEDURE — 6360000002 HC RX W HCPCS: Performed by: SPECIALIST

## 2018-05-11 PROCEDURE — 3600000014 HC SURGERY LEVEL 4 ADDTL 15MIN: Performed by: NEUROLOGICAL SURGERY

## 2018-05-11 PROCEDURE — 3700000000 HC ANESTHESIA ATTENDED CARE: Performed by: NEUROLOGICAL SURGERY

## 2018-05-11 PROCEDURE — 2500000003 HC RX 250 WO HCPCS: Performed by: NEUROLOGICAL SURGERY

## 2018-05-11 PROCEDURE — 6370000000 HC RX 637 (ALT 250 FOR IP): Performed by: NEUROLOGICAL SURGERY

## 2018-05-11 PROCEDURE — 6360000002 HC RX W HCPCS: Performed by: NEUROLOGICAL SURGERY

## 2018-05-11 PROCEDURE — 82947 ASSAY GLUCOSE BLOOD QUANT: CPT

## 2018-05-11 PROCEDURE — 6360000002 HC RX W HCPCS

## 2018-05-11 PROCEDURE — 22858 TOT DISC ARTHRP 2ND LVL CRV: CPT | Performed by: NEUROLOGICAL SURGERY

## 2018-05-11 PROCEDURE — 0RB30ZZ EXCISION OF CERVICAL VERTEBRAL DISC, OPEN APPROACH: ICD-10-PCS | Performed by: NEUROLOGICAL SURGERY

## 2018-05-11 PROCEDURE — 2580000003 HC RX 258: Performed by: SPECIALIST

## 2018-05-11 PROCEDURE — 22855 REMOVAL ANTERIOR INSTRMJ: CPT | Performed by: NEUROLOGICAL SURGERY

## 2018-05-11 PROCEDURE — 2580000003 HC RX 258: Performed by: NEUROLOGICAL SURGERY

## 2018-05-11 PROCEDURE — 2500000003 HC RX 250 WO HCPCS: Performed by: SPECIALIST

## 2018-05-11 PROCEDURE — 7100000001 HC PACU RECOVERY - ADDTL 15 MIN: Performed by: NEUROLOGICAL SURGERY

## 2018-05-11 PROCEDURE — 3700000001 HC ADD 15 MINUTES (ANESTHESIA): Performed by: NEUROLOGICAL SURGERY

## 2018-05-11 PROCEDURE — 87086 URINE CULTURE/COLONY COUNT: CPT

## 2018-05-11 PROCEDURE — 2500000003 HC RX 250 WO HCPCS: Performed by: ANESTHESIOLOGY

## 2018-05-11 PROCEDURE — 72040 X-RAY EXAM NECK SPINE 2-3 VW: CPT

## 2018-05-11 PROCEDURE — 0RG20J0 FUSION OF 2 OR MORE CERVICAL VERTEBRAL JOINTS WITH SYNTHETIC SUBSTITUTE, ANTERIOR APPROACH, ANTERIOR COLUMN, OPEN APPROACH: ICD-10-PCS | Performed by: NEUROLOGICAL SURGERY

## 2018-05-11 PROCEDURE — 2720000010 HC SURG SUPPLY STERILE: Performed by: NEUROLOGICAL SURGERY

## 2018-05-11 PROCEDURE — 22856 TOT DISC ARTHRP 1NTRSPC CRV: CPT | Performed by: NEUROLOGICAL SURGERY

## 2018-05-11 DEVICE — PRESTIGE LP DISC 6X18MM
Type: IMPLANTABLE DEVICE | Site: SPINE CERVICAL | Status: FUNCTIONAL
Brand: PRESTIGE® LP CERVICAL DISC SYSTEM

## 2018-05-11 RX ORDER — PROPOFOL 10 MG/ML
INJECTION, EMULSION INTRAVENOUS PRN
Status: DISCONTINUED | OUTPATIENT
Start: 2018-05-11 | End: 2018-05-11 | Stop reason: SDUPTHER

## 2018-05-11 RX ORDER — LISINOPRIL 20 MG/1
20 TABLET ORAL DAILY
Status: CANCELLED | OUTPATIENT
Start: 2018-05-11

## 2018-05-11 RX ORDER — METHYLPREDNISOLONE 4 MG/1
TABLET ORAL
Qty: 1 KIT | Refills: 0 | Status: SHIPPED | OUTPATIENT
Start: 2018-05-11 | End: 2018-05-17

## 2018-05-11 RX ORDER — SODIUM CHLORIDE 9 MG/ML
INJECTION, SOLUTION INTRAVENOUS CONTINUOUS
Status: DISCONTINUED | OUTPATIENT
Start: 2018-05-11 | End: 2018-05-13 | Stop reason: HOSPADM

## 2018-05-11 RX ORDER — GLIMEPIRIDE 2 MG/1
2 TABLET ORAL EVERY MORNING
Status: CANCELLED | OUTPATIENT
Start: 2018-05-12

## 2018-05-11 RX ORDER — DEXAMETHASONE SODIUM PHOSPHATE 4 MG/ML
4 INJECTION, SOLUTION INTRA-ARTICULAR; INTRALESIONAL; INTRAMUSCULAR; INTRAVENOUS; SOFT TISSUE EVERY 6 HOURS
Status: DISCONTINUED | OUTPATIENT
Start: 2018-05-11 | End: 2018-05-13 | Stop reason: HOSPADM

## 2018-05-11 RX ORDER — PROPOFOL 10 MG/ML
INJECTION, EMULSION INTRAVENOUS CONTINUOUS PRN
Status: DISCONTINUED | OUTPATIENT
Start: 2018-05-11 | End: 2018-05-11 | Stop reason: SDUPTHER

## 2018-05-11 RX ORDER — FENTANYL CITRATE 50 UG/ML
INJECTION, SOLUTION INTRAMUSCULAR; INTRAVENOUS PRN
Status: DISCONTINUED | OUTPATIENT
Start: 2018-05-11 | End: 2018-05-11 | Stop reason: SDUPTHER

## 2018-05-11 RX ORDER — LABETALOL HYDROCHLORIDE 5 MG/ML
5 INJECTION, SOLUTION INTRAVENOUS 2 TIMES DAILY PRN
Status: DISCONTINUED | OUTPATIENT
Start: 2018-05-11 | End: 2018-05-11 | Stop reason: HOSPADM

## 2018-05-11 RX ORDER — LIDOCAINE HYDROCHLORIDE AND EPINEPHRINE 10; 10 MG/ML; UG/ML
INJECTION, SOLUTION INFILTRATION; PERINEURAL PRN
Status: DISCONTINUED | OUTPATIENT
Start: 2018-05-11 | End: 2018-05-11 | Stop reason: HOSPADM

## 2018-05-11 RX ORDER — ONDANSETRON 2 MG/ML
4 INJECTION INTRAMUSCULAR; INTRAVENOUS EVERY 6 HOURS PRN
Status: DISCONTINUED | OUTPATIENT
Start: 2018-05-11 | End: 2018-05-13 | Stop reason: HOSPADM

## 2018-05-11 RX ORDER — PANTOPRAZOLE SODIUM 40 MG/1
40 TABLET, DELAYED RELEASE ORAL
Status: CANCELLED | OUTPATIENT
Start: 2018-05-12

## 2018-05-11 RX ORDER — MIDAZOLAM HYDROCHLORIDE 1 MG/ML
INJECTION INTRAMUSCULAR; INTRAVENOUS PRN
Status: DISCONTINUED | OUTPATIENT
Start: 2018-05-11 | End: 2018-05-11 | Stop reason: SDUPTHER

## 2018-05-11 RX ORDER — PROMETHAZINE HYDROCHLORIDE 25 MG/ML
6.25 INJECTION, SOLUTION INTRAMUSCULAR; INTRAVENOUS ONCE
Status: COMPLETED | OUTPATIENT
Start: 2018-05-11 | End: 2018-05-11

## 2018-05-11 RX ORDER — CEFAZOLIN SODIUM 1 G/50ML
1 INJECTION, SOLUTION INTRAVENOUS EVERY 8 HOURS
Status: COMPLETED | OUTPATIENT
Start: 2018-05-11 | End: 2018-05-12

## 2018-05-11 RX ORDER — OXYCODONE HYDROCHLORIDE AND ACETAMINOPHEN 5; 325 MG/1; MG/1
1-2 TABLET ORAL EVERY 6 HOURS PRN
Qty: 56 TABLET | Refills: 0 | Status: SHIPPED | OUTPATIENT
Start: 2018-05-11 | End: 2018-05-18

## 2018-05-11 RX ORDER — SODIUM CHLORIDE, SODIUM LACTATE, POTASSIUM CHLORIDE, CALCIUM CHLORIDE 600; 310; 30; 20 MG/100ML; MG/100ML; MG/100ML; MG/100ML
INJECTION, SOLUTION INTRAVENOUS CONTINUOUS
Status: DISCONTINUED | OUTPATIENT
Start: 2018-05-11 | End: 2018-05-12

## 2018-05-11 RX ORDER — OXYCODONE HYDROCHLORIDE AND ACETAMINOPHEN 5; 325 MG/1; MG/1
2 TABLET ORAL EVERY 6 HOURS PRN
Status: DISCONTINUED | OUTPATIENT
Start: 2018-05-11 | End: 2018-05-13 | Stop reason: HOSPADM

## 2018-05-11 RX ORDER — ROCURONIUM BROMIDE 10 MG/ML
INJECTION, SOLUTION INTRAVENOUS PRN
Status: DISCONTINUED | OUTPATIENT
Start: 2018-05-11 | End: 2018-05-11 | Stop reason: SDUPTHER

## 2018-05-11 RX ORDER — SODIUM CHLORIDE, SODIUM LACTATE, POTASSIUM CHLORIDE, CALCIUM CHLORIDE 600; 310; 30; 20 MG/100ML; MG/100ML; MG/100ML; MG/100ML
INJECTION, SOLUTION INTRAVENOUS CONTINUOUS PRN
Status: DISCONTINUED | OUTPATIENT
Start: 2018-05-11 | End: 2018-05-11 | Stop reason: SDUPTHER

## 2018-05-11 RX ORDER — METFORMIN HYDROCHLORIDE 750 MG/1
750 TABLET, EXTENDED RELEASE ORAL
Status: CANCELLED | OUTPATIENT
Start: 2018-05-12

## 2018-05-11 RX ORDER — PROMETHAZINE HYDROCHLORIDE 25 MG/ML
INJECTION, SOLUTION INTRAMUSCULAR; INTRAVENOUS
Status: COMPLETED
Start: 2018-05-11 | End: 2018-05-11

## 2018-05-11 RX ORDER — SIMVASTATIN 40 MG
40 TABLET ORAL NIGHTLY
Status: CANCELLED | OUTPATIENT
Start: 2018-05-11

## 2018-05-11 RX ORDER — LIDOCAINE HYDROCHLORIDE 10 MG/ML
INJECTION, SOLUTION INFILTRATION; PERINEURAL PRN
Status: DISCONTINUED | OUTPATIENT
Start: 2018-05-11 | End: 2018-05-11 | Stop reason: SDUPTHER

## 2018-05-11 RX ORDER — SODIUM CHLORIDE 9 MG/ML
INJECTION, SOLUTION INTRAVENOUS CONTINUOUS PRN
Status: DISCONTINUED | OUTPATIENT
Start: 2018-05-11 | End: 2018-05-11 | Stop reason: SDUPTHER

## 2018-05-11 RX ADMIN — LABETALOL HYDROCHLORIDE 5 MG: 5 INJECTION, SOLUTION INTRAVENOUS at 16:00

## 2018-05-11 RX ADMIN — FENTANYL CITRATE 100 MCG: 50 INJECTION INTRAMUSCULAR; INTRAVENOUS at 10:05

## 2018-05-11 RX ADMIN — HYDROMORPHONE HYDROCHLORIDE 1 MG: 1 INJECTION, SOLUTION INTRAMUSCULAR; INTRAVENOUS; SUBCUTANEOUS at 18:38

## 2018-05-11 RX ADMIN — FENTANYL CITRATE 50 MCG: 50 INJECTION INTRAMUSCULAR; INTRAVENOUS at 11:05

## 2018-05-11 RX ADMIN — PROPOFOL 120 MCG/KG/MIN: 10 INJECTION, EMULSION INTRAVENOUS at 10:15

## 2018-05-11 RX ADMIN — CEFAZOLIN SODIUM 2 G: 2 SOLUTION INTRAVENOUS at 13:15

## 2018-05-11 RX ADMIN — DEXAMETHASONE SODIUM PHOSPHATE 4 MG: 4 INJECTION, SOLUTION INTRAMUSCULAR; INTRAVENOUS at 21:16

## 2018-05-11 RX ADMIN — HYDROMORPHONE HYDROCHLORIDE 1 MG: 1 INJECTION, SOLUTION INTRAMUSCULAR; INTRAVENOUS; SUBCUTANEOUS at 23:35

## 2018-05-11 RX ADMIN — SODIUM CHLORIDE: 9 INJECTION, SOLUTION INTRAVENOUS at 18:38

## 2018-05-11 RX ADMIN — SUFENTANIL CITRATE 0.5 MCG/KG/HR: 50 INJECTION EPIDURAL; INTRAVENOUS at 10:15

## 2018-05-11 RX ADMIN — CEFAZOLIN SODIUM 1 G: 1 INJECTION, SOLUTION INTRAVENOUS at 21:16

## 2018-05-11 RX ADMIN — PHENYLEPHRINE HYDROCHLORIDE 25 MCG/MIN: 10 INJECTION INTRAVENOUS at 10:36

## 2018-05-11 RX ADMIN — PROMETHAZINE HYDROCHLORIDE 6.25 MG: 25 INJECTION INTRAMUSCULAR; INTRAVENOUS at 16:30

## 2018-05-11 RX ADMIN — LABETALOL HYDROCHLORIDE 5 MG: 5 INJECTION, SOLUTION INTRAVENOUS at 16:20

## 2018-05-11 RX ADMIN — SODIUM CHLORIDE, POTASSIUM CHLORIDE, SODIUM LACTATE AND CALCIUM CHLORIDE: 600; 310; 30; 20 INJECTION, SOLUTION INTRAVENOUS at 10:01

## 2018-05-11 RX ADMIN — SODIUM CHLORIDE: 9 INJECTION, SOLUTION INTRAVENOUS at 10:11

## 2018-05-11 RX ADMIN — LIDOCAINE HYDROCHLORIDE 50 MG: 10 INJECTION, SOLUTION INFILTRATION; PERINEURAL at 10:05

## 2018-05-11 RX ADMIN — PROPOFOL 200 MG: 10 INJECTION, EMULSION INTRAVENOUS at 10:05

## 2018-05-11 RX ADMIN — MIDAZOLAM HYDROCHLORIDE 2 MG: 1 INJECTION, SOLUTION INTRAMUSCULAR; INTRAVENOUS at 10:02

## 2018-05-11 RX ADMIN — ROCURONIUM BROMIDE 40 MG: 10 INJECTION INTRAVENOUS at 10:05

## 2018-05-11 RX ADMIN — PROMETHAZINE HYDROCHLORIDE 6.25 MG: 25 INJECTION, SOLUTION INTRAMUSCULAR; INTRAVENOUS at 16:30

## 2018-05-11 RX ADMIN — CEFAZOLIN SODIUM 2 G: 2 SOLUTION INTRAVENOUS at 10:21

## 2018-05-11 RX ADMIN — ONDANSETRON 4 MG: 2 INJECTION INTRAMUSCULAR; INTRAVENOUS at 23:38

## 2018-05-11 RX ADMIN — SODIUM CHLORIDE, POTASSIUM CHLORIDE, SODIUM LACTATE AND CALCIUM CHLORIDE: 600; 310; 30; 20 INJECTION, SOLUTION INTRAVENOUS at 08:53

## 2018-05-11 ASSESSMENT — PULMONARY FUNCTION TESTS
PIF_VALUE: 25
PIF_VALUE: 24
PIF_VALUE: 23
PIF_VALUE: 25
PIF_VALUE: 24
PIF_VALUE: 25
PIF_VALUE: 24
PIF_VALUE: 22
PIF_VALUE: 25
PIF_VALUE: 23
PIF_VALUE: 23
PIF_VALUE: 24
PIF_VALUE: 23
PIF_VALUE: 24
PIF_VALUE: 20
PIF_VALUE: 25
PIF_VALUE: 24
PIF_VALUE: 25
PIF_VALUE: 1
PIF_VALUE: 1
PIF_VALUE: 22
PIF_VALUE: 25
PIF_VALUE: 25
PIF_VALUE: 23
PIF_VALUE: 25
PIF_VALUE: 25
PIF_VALUE: 24
PIF_VALUE: 21
PIF_VALUE: 25
PIF_VALUE: 23
PIF_VALUE: 25
PIF_VALUE: 26
PIF_VALUE: 8
PIF_VALUE: 25
PIF_VALUE: 26
PIF_VALUE: 24
PIF_VALUE: 25
PIF_VALUE: 26
PIF_VALUE: 25
PIF_VALUE: 25
PIF_VALUE: 19
PIF_VALUE: 26
PIF_VALUE: 25
PIF_VALUE: 25
PIF_VALUE: 24
PIF_VALUE: 25
PIF_VALUE: 25
PIF_VALUE: 24
PIF_VALUE: 26
PIF_VALUE: 20
PIF_VALUE: 23
PIF_VALUE: 5
PIF_VALUE: 25
PIF_VALUE: 25
PIF_VALUE: 1
PIF_VALUE: 25
PIF_VALUE: 25
PIF_VALUE: 24
PIF_VALUE: 25
PIF_VALUE: 25
PIF_VALUE: 24
PIF_VALUE: 24
PIF_VALUE: 25
PIF_VALUE: 19
PIF_VALUE: 25
PIF_VALUE: 23
PIF_VALUE: 24
PIF_VALUE: 23
PIF_VALUE: 25
PIF_VALUE: 23
PIF_VALUE: 24
PIF_VALUE: 25
PIF_VALUE: 25
PIF_VALUE: 19
PIF_VALUE: 20
PIF_VALUE: 24
PIF_VALUE: 25
PIF_VALUE: 25
PIF_VALUE: 23
PIF_VALUE: 24
PIF_VALUE: 24
PIF_VALUE: 23
PIF_VALUE: 24
PIF_VALUE: 24
PIF_VALUE: 25
PIF_VALUE: 25
PIF_VALUE: 24
PIF_VALUE: 24
PIF_VALUE: 21
PIF_VALUE: 23
PIF_VALUE: 21
PIF_VALUE: 25
PIF_VALUE: 23
PIF_VALUE: 25
PIF_VALUE: 25
PIF_VALUE: 24
PIF_VALUE: 25
PIF_VALUE: 25
PIF_VALUE: 24
PIF_VALUE: 25
PIF_VALUE: 24
PIF_VALUE: 19
PIF_VALUE: 25
PIF_VALUE: 25
PIF_VALUE: 23
PIF_VALUE: 26
PIF_VALUE: 25
PIF_VALUE: 24
PIF_VALUE: 24
PIF_VALUE: 26
PIF_VALUE: 25
PIF_VALUE: 26
PIF_VALUE: 25
PIF_VALUE: 19
PIF_VALUE: 25
PIF_VALUE: 24
PIF_VALUE: 25
PIF_VALUE: 23
PIF_VALUE: 23
PIF_VALUE: 25
PIF_VALUE: 23
PIF_VALUE: 25
PIF_VALUE: 26
PIF_VALUE: 25
PIF_VALUE: 23
PIF_VALUE: 24
PIF_VALUE: 23
PIF_VALUE: 25
PIF_VALUE: 2
PIF_VALUE: 24
PIF_VALUE: 25
PIF_VALUE: 24
PIF_VALUE: 24
PIF_VALUE: 25
PIF_VALUE: 19
PIF_VALUE: 25
PIF_VALUE: 25
PIF_VALUE: 15
PIF_VALUE: 20
PIF_VALUE: 25
PIF_VALUE: 26
PIF_VALUE: 25
PIF_VALUE: 1
PIF_VALUE: 24
PIF_VALUE: 25
PIF_VALUE: 25
PIF_VALUE: 24
PIF_VALUE: 26
PIF_VALUE: 24
PIF_VALUE: 25
PIF_VALUE: 23
PIF_VALUE: 25
PIF_VALUE: 25
PIF_VALUE: 24
PIF_VALUE: 25
PIF_VALUE: 23
PIF_VALUE: 1
PIF_VALUE: 25
PIF_VALUE: 21
PIF_VALUE: 1
PIF_VALUE: 25
PIF_VALUE: 6
PIF_VALUE: 25
PIF_VALUE: 7
PIF_VALUE: 25
PIF_VALUE: 25
PIF_VALUE: 24
PIF_VALUE: 25
PIF_VALUE: 20
PIF_VALUE: 25
PIF_VALUE: 25
PIF_VALUE: 24
PIF_VALUE: 25
PIF_VALUE: 25
PIF_VALUE: 24
PIF_VALUE: 25
PIF_VALUE: 25
PIF_VALUE: 24
PIF_VALUE: 21
PIF_VALUE: 25
PIF_VALUE: 23
PIF_VALUE: 1
PIF_VALUE: 25
PIF_VALUE: 23
PIF_VALUE: 24
PIF_VALUE: 25
PIF_VALUE: 23
PIF_VALUE: 23
PIF_VALUE: 25
PIF_VALUE: 23
PIF_VALUE: 19
PIF_VALUE: 25
PIF_VALUE: 21
PIF_VALUE: 26
PIF_VALUE: 24
PIF_VALUE: 25
PIF_VALUE: 25
PIF_VALUE: 23
PIF_VALUE: 24
PIF_VALUE: 25
PIF_VALUE: 25
PIF_VALUE: 22
PIF_VALUE: 25
PIF_VALUE: 25
PIF_VALUE: 24
PIF_VALUE: 24
PIF_VALUE: 25
PIF_VALUE: 23
PIF_VALUE: 24
PIF_VALUE: 24
PIF_VALUE: 23
PIF_VALUE: 25
PIF_VALUE: 24
PIF_VALUE: 25
PIF_VALUE: 24
PIF_VALUE: 25
PIF_VALUE: 23
PIF_VALUE: 24
PIF_VALUE: 23
PIF_VALUE: 25
PIF_VALUE: 25
PIF_VALUE: 24
PIF_VALUE: 25
PIF_VALUE: 25
PIF_VALUE: 24
PIF_VALUE: 25
PIF_VALUE: 26
PIF_VALUE: 24
PIF_VALUE: 25
PIF_VALUE: 25
PIF_VALUE: 27
PIF_VALUE: 25
PIF_VALUE: 25
PIF_VALUE: 24

## 2018-05-11 ASSESSMENT — PAIN SCALES - GENERAL
PAINLEVEL_OUTOF10: 0
PAINLEVEL_OUTOF10: 0
PAINLEVEL_OUTOF10: 9
PAINLEVEL_OUTOF10: 0
PAINLEVEL_OUTOF10: 5
PAINLEVEL_OUTOF10: 10
PAINLEVEL_OUTOF10: 0
PAINLEVEL_OUTOF10: 0
PAINLEVEL_OUTOF10: 9
PAINLEVEL_OUTOF10: 9

## 2018-05-11 ASSESSMENT — PAIN DESCRIPTION - PAIN TYPE
TYPE: ACUTE PAIN;SURGICAL PAIN
TYPE: ACUTE PAIN;SURGICAL PAIN

## 2018-05-11 ASSESSMENT — PAIN - FUNCTIONAL ASSESSMENT: PAIN_FUNCTIONAL_ASSESSMENT: 0-10

## 2018-05-11 ASSESSMENT — PAIN DESCRIPTION - ORIENTATION
ORIENTATION: RIGHT;ANTERIOR
ORIENTATION: RIGHT;ANTERIOR

## 2018-05-11 ASSESSMENT — PAIN DESCRIPTION - LOCATION
LOCATION: NECK
LOCATION: NECK

## 2018-05-11 ASSESSMENT — PAIN DESCRIPTION - DESCRIPTORS: DESCRIPTORS: ACHING

## 2018-05-12 ENCOUNTER — APPOINTMENT (OUTPATIENT)
Dept: GENERAL RADIOLOGY | Age: 63
DRG: 473 | End: 2018-05-12
Attending: NEUROLOGICAL SURGERY
Payer: COMMERCIAL

## 2018-05-12 PROBLEM — E11.9 TYPE 2 DIABETES MELLITUS (HCC): Status: ACTIVE | Noted: 2018-05-12

## 2018-05-12 LAB
CULTURE: NO GROWTH
CULTURE: NORMAL
GLUCOSE BLD-MCNC: 192 MG/DL (ref 65–105)
GLUCOSE BLD-MCNC: 214 MG/DL (ref 65–105)
GLUCOSE BLD-MCNC: 301 MG/DL (ref 65–105)
Lab: NORMAL
SPECIMEN DESCRIPTION: NORMAL
STATUS: NORMAL

## 2018-05-12 PROCEDURE — 99232 SBSQ HOSP IP/OBS MODERATE 35: CPT | Performed by: INTERNAL MEDICINE

## 2018-05-12 PROCEDURE — 6370000000 HC RX 637 (ALT 250 FOR IP): Performed by: NEUROLOGICAL SURGERY

## 2018-05-12 PROCEDURE — G8978 MOBILITY CURRENT STATUS: HCPCS

## 2018-05-12 PROCEDURE — 97162 PT EVAL MOD COMPLEX 30 MIN: CPT

## 2018-05-12 PROCEDURE — 6360000002 HC RX W HCPCS: Performed by: NEUROLOGICAL SURGERY

## 2018-05-12 PROCEDURE — 97535 SELF CARE MNGMENT TRAINING: CPT

## 2018-05-12 PROCEDURE — G8987 SELF CARE CURRENT STATUS: HCPCS

## 2018-05-12 PROCEDURE — 99024 POSTOP FOLLOW-UP VISIT: CPT | Performed by: NEUROLOGICAL SURGERY

## 2018-05-12 PROCEDURE — 97165 OT EVAL LOW COMPLEX 30 MIN: CPT

## 2018-05-12 PROCEDURE — 72050 X-RAY EXAM NECK SPINE 4/5VWS: CPT

## 2018-05-12 PROCEDURE — 6370000000 HC RX 637 (ALT 250 FOR IP)

## 2018-05-12 PROCEDURE — 1200000000 HC SEMI PRIVATE

## 2018-05-12 PROCEDURE — 97530 THERAPEUTIC ACTIVITIES: CPT

## 2018-05-12 PROCEDURE — 6370000000 HC RX 637 (ALT 250 FOR IP): Performed by: STUDENT IN AN ORGANIZED HEALTH CARE EDUCATION/TRAINING PROGRAM

## 2018-05-12 PROCEDURE — 82947 ASSAY GLUCOSE BLOOD QUANT: CPT

## 2018-05-12 PROCEDURE — 99255 IP/OBS CONSLTJ NEW/EST HI 80: CPT | Performed by: INTERNAL MEDICINE

## 2018-05-12 PROCEDURE — G8988 SELF CARE GOAL STATUS: HCPCS

## 2018-05-12 PROCEDURE — G8979 MOBILITY GOAL STATUS: HCPCS

## 2018-05-12 RX ORDER — NICOTINE POLACRILEX 4 MG
15 LOZENGE BUCCAL PRN
Status: DISCONTINUED | OUTPATIENT
Start: 2018-05-12 | End: 2018-05-13 | Stop reason: HOSPADM

## 2018-05-12 RX ORDER — ACETAMINOPHEN 325 MG/1
650 TABLET ORAL EVERY 4 HOURS PRN
Status: DISCONTINUED | OUTPATIENT
Start: 2018-05-12 | End: 2018-05-13 | Stop reason: HOSPADM

## 2018-05-12 RX ORDER — ACETAMINOPHEN 325 MG/1
TABLET ORAL
Status: COMPLETED
Start: 2018-05-12 | End: 2018-05-12

## 2018-05-12 RX ORDER — DEXTROSE MONOHYDRATE 50 MG/ML
100 INJECTION, SOLUTION INTRAVENOUS PRN
Status: DISCONTINUED | OUTPATIENT
Start: 2018-05-12 | End: 2018-05-13 | Stop reason: HOSPADM

## 2018-05-12 RX ORDER — DEXTROSE MONOHYDRATE 25 G/50ML
12.5 INJECTION, SOLUTION INTRAVENOUS PRN
Status: DISCONTINUED | OUTPATIENT
Start: 2018-05-12 | End: 2018-05-13 | Stop reason: HOSPADM

## 2018-05-12 RX ORDER — DOCUSATE SODIUM 100 MG/1
100 CAPSULE, LIQUID FILLED ORAL DAILY
Status: DISCONTINUED | OUTPATIENT
Start: 2018-05-12 | End: 2018-05-13 | Stop reason: HOSPADM

## 2018-05-12 RX ORDER — SENNA PLUS 8.6 MG/1
1 TABLET ORAL PRN
Status: DISCONTINUED | OUTPATIENT
Start: 2018-05-12 | End: 2018-05-13 | Stop reason: HOSPADM

## 2018-05-12 RX ADMIN — OXYCODONE HYDROCHLORIDE AND ACETAMINOPHEN 2 TABLET: 5; 325 TABLET ORAL at 04:11

## 2018-05-12 RX ADMIN — ONDANSETRON 4 MG: 2 INJECTION INTRAMUSCULAR; INTRAVENOUS at 08:35

## 2018-05-12 RX ADMIN — CEFAZOLIN SODIUM 1 G: 1 INJECTION, SOLUTION INTRAVENOUS at 05:08

## 2018-05-12 RX ADMIN — INSULIN LISPRO 4 UNITS: 100 INJECTION, SOLUTION INTRAVENOUS; SUBCUTANEOUS at 11:53

## 2018-05-12 RX ADMIN — DEXAMETHASONE SODIUM PHOSPHATE 4 MG: 4 INJECTION, SOLUTION INTRAMUSCULAR; INTRAVENOUS at 19:36

## 2018-05-12 RX ADMIN — INSULIN LISPRO 4 UNITS: 100 INJECTION, SOLUTION INTRAVENOUS; SUBCUTANEOUS at 19:50

## 2018-05-12 RX ADMIN — ACETAMINOPHEN 325 MG: 325 TABLET ORAL at 16:23

## 2018-05-12 RX ADMIN — ACETAMINOPHEN 650 MG: 325 TABLET ORAL at 20:14

## 2018-05-12 RX ADMIN — DEXAMETHASONE SODIUM PHOSPHATE 4 MG: 4 INJECTION, SOLUTION INTRAMUSCULAR; INTRAVENOUS at 08:42

## 2018-05-12 RX ADMIN — ENOXAPARIN SODIUM 40 MG: 40 INJECTION SUBCUTANEOUS at 08:41

## 2018-05-12 RX ADMIN — CEFAZOLIN SODIUM 1 G: 1 INJECTION, SOLUTION INTRAVENOUS at 14:37

## 2018-05-12 RX ADMIN — DEXAMETHASONE SODIUM PHOSPHATE 4 MG: 4 INJECTION, SOLUTION INTRAMUSCULAR; INTRAVENOUS at 14:37

## 2018-05-12 RX ADMIN — DEXAMETHASONE SODIUM PHOSPHATE 4 MG: 4 INJECTION, SOLUTION INTRAMUSCULAR; INTRAVENOUS at 03:15

## 2018-05-12 RX ADMIN — DOCUSATE SODIUM 100 MG: 100 TABLET, FILM COATED ORAL at 13:10

## 2018-05-12 ASSESSMENT — PAIN DESCRIPTION - LOCATION
LOCATION: NECK

## 2018-05-12 ASSESSMENT — PAIN DESCRIPTION - ORIENTATION
ORIENTATION: RIGHT;ANTERIOR
ORIENTATION: ANTERIOR

## 2018-05-12 ASSESSMENT — PAIN DESCRIPTION - PROGRESSION
CLINICAL_PROGRESSION: GRADUALLY IMPROVING

## 2018-05-12 ASSESSMENT — PAIN DESCRIPTION - ONSET
ONSET: ON-GOING
ONSET: ON-GOING

## 2018-05-12 ASSESSMENT — PAIN DESCRIPTION - DESCRIPTORS
DESCRIPTORS: ACHING
DESCRIPTORS: ACHING

## 2018-05-12 ASSESSMENT — PAIN SCALES - GENERAL
PAINLEVEL_OUTOF10: 3
PAINLEVEL_OUTOF10: 0
PAINLEVEL_OUTOF10: 5
PAINLEVEL_OUTOF10: 3
PAINLEVEL_OUTOF10: 1
PAINLEVEL_OUTOF10: 7
PAINLEVEL_OUTOF10: 7

## 2018-05-12 ASSESSMENT — PAIN DESCRIPTION - PAIN TYPE
TYPE: SURGICAL PAIN
TYPE: SURGICAL PAIN
TYPE: ACUTE PAIN;SURGICAL PAIN

## 2018-05-12 ASSESSMENT — PAIN DESCRIPTION - FREQUENCY
FREQUENCY: CONTINUOUS
FREQUENCY: INTERMITTENT

## 2018-05-13 VITALS
OXYGEN SATURATION: 100 % | SYSTOLIC BLOOD PRESSURE: 165 MMHG | HEART RATE: 90 BPM | BODY MASS INDEX: 31.83 KG/M2 | DIASTOLIC BLOOD PRESSURE: 92 MMHG | HEIGHT: 67 IN | RESPIRATION RATE: 16 BRPM | WEIGHT: 202.82 LBS | TEMPERATURE: 97.9 F

## 2018-05-13 LAB — GLUCOSE BLD-MCNC: 169 MG/DL (ref 65–105)

## 2018-05-13 PROCEDURE — 6360000002 HC RX W HCPCS: Performed by: NEUROLOGICAL SURGERY

## 2018-05-13 PROCEDURE — 82947 ASSAY GLUCOSE BLOOD QUANT: CPT

## 2018-05-13 PROCEDURE — 6370000000 HC RX 637 (ALT 250 FOR IP): Performed by: HOSPITALIST

## 2018-05-13 PROCEDURE — 6370000000 HC RX 637 (ALT 250 FOR IP): Performed by: NEUROLOGICAL SURGERY

## 2018-05-13 PROCEDURE — 6370000000 HC RX 637 (ALT 250 FOR IP): Performed by: STUDENT IN AN ORGANIZED HEALTH CARE EDUCATION/TRAINING PROGRAM

## 2018-05-13 RX ADMIN — DEXAMETHASONE SODIUM PHOSPHATE 4 MG: 4 INJECTION, SOLUTION INTRAMUSCULAR; INTRAVENOUS at 08:01

## 2018-05-13 RX ADMIN — DOCUSATE SODIUM 100 MG: 100 TABLET, FILM COATED ORAL at 08:01

## 2018-05-13 RX ADMIN — ACETAMINOPHEN 650 MG: 325 TABLET ORAL at 00:36

## 2018-05-13 RX ADMIN — ACETAMINOPHEN 650 MG: 325 TABLET ORAL at 05:42

## 2018-05-13 RX ADMIN — INSULIN LISPRO 3 UNITS: 100 INJECTION, SOLUTION INTRAVENOUS; SUBCUTANEOUS at 07:56

## 2018-05-13 RX ADMIN — DEXAMETHASONE SODIUM PHOSPHATE 4 MG: 4 INJECTION, SOLUTION INTRAMUSCULAR; INTRAVENOUS at 03:24

## 2018-05-13 ASSESSMENT — PAIN DESCRIPTION - PROGRESSION

## 2018-05-13 ASSESSMENT — PAIN SCALES - GENERAL
PAINLEVEL_OUTOF10: 1
PAINLEVEL_OUTOF10: 2

## 2018-05-18 ENCOUNTER — TELEPHONE (OUTPATIENT)
Dept: NEUROSURGERY | Age: 63
End: 2018-05-18

## 2018-05-23 ENCOUNTER — OFFICE VISIT (OUTPATIENT)
Dept: NEUROSURGERY | Age: 63
End: 2018-05-23

## 2018-05-23 VITALS
BODY MASS INDEX: 31.95 KG/M2 | WEIGHT: 204 LBS | SYSTOLIC BLOOD PRESSURE: 178 MMHG | TEMPERATURE: 98.2 F | HEART RATE: 83 BPM | DIASTOLIC BLOOD PRESSURE: 93 MMHG

## 2018-05-23 DIAGNOSIS — M50.90 CERVICAL DISC DISORDER: Primary | ICD-10-CM

## 2018-05-23 PROCEDURE — 99024 POSTOP FOLLOW-UP VISIT: CPT | Performed by: NEUROLOGICAL SURGERY

## 2018-05-23 RX ORDER — ACETAMINOPHEN 325 MG/1
650 TABLET ORAL EVERY 6 HOURS PRN
COMMUNITY
End: 2020-01-21 | Stop reason: SDUPTHER

## 2018-05-29 ENCOUNTER — OFFICE VISIT (OUTPATIENT)
Dept: PRIMARY CARE CLINIC | Age: 63
End: 2018-05-29
Payer: COMMERCIAL

## 2018-05-29 VITALS
HEIGHT: 67 IN | WEIGHT: 204.8 LBS | HEART RATE: 68 BPM | SYSTOLIC BLOOD PRESSURE: 140 MMHG | RESPIRATION RATE: 16 BRPM | BODY MASS INDEX: 32.15 KG/M2 | OXYGEN SATURATION: 98 % | TEMPERATURE: 97.7 F | DIASTOLIC BLOOD PRESSURE: 100 MMHG
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
## 2018-05-29 PROCEDURE — 99213 OFFICE O/P EST LOW 20 MIN: CPT | Performed by: FAMILY MEDICINE

## 2018-05-29 RX ORDER — TRIAMCINOLONE ACETONIDE 1 MG/G
CREAM TOPICAL 2 TIMES DAILY
Qty: 80 G | Refills: 1 | Status: SHIPPED | OUTPATIENT
Start: 2018-05-29 | End: 2019-02-11 | Stop reason: ALTCHOICE

## 2018-05-29 ASSESSMENT — ENCOUNTER SYMPTOMS
GASTROINTESTINAL NEGATIVE: 1
RESPIRATORY NEGATIVE: 1
EYES NEGATIVE: 1

## 2018-05-29 ASSESSMENT — PATIENT HEALTH QUESTIONNAIRE - PHQ9
1. LITTLE INTEREST OR PLEASURE IN DOING THINGS: 0
SUM OF ALL RESPONSES TO PHQ9 QUESTIONS 1 & 2: 0
SUM OF ALL RESPONSES TO PHQ QUESTIONS 1-9: 0
2. FEELING DOWN, DEPRESSED OR HOPELESS: 0

## 2018-05-31 ENCOUNTER — TELEPHONE (OUTPATIENT)
Dept: NEUROSURGERY | Age: 63
End: 2018-05-31

## 2018-06-07 ENCOUNTER — OFFICE VISIT (OUTPATIENT)
Dept: FAMILY MEDICINE CLINIC | Age: 63
End: 2018-06-07
Payer: COMMERCIAL

## 2018-06-07 VITALS
HEIGHT: 67 IN | DIASTOLIC BLOOD PRESSURE: 72 MMHG | HEART RATE: 68 BPM | BODY MASS INDEX: 32.15 KG/M2 | SYSTOLIC BLOOD PRESSURE: 128 MMHG | WEIGHT: 204.81 LBS

## 2018-06-07 DIAGNOSIS — I10 ESSENTIAL HYPERTENSION: ICD-10-CM

## 2018-06-07 DIAGNOSIS — E66.09 CLASS 1 OBESITY DUE TO EXCESS CALORIES WITH SERIOUS COMORBIDITY AND BODY MASS INDEX (BMI) OF 32.0 TO 32.9 IN ADULT: ICD-10-CM

## 2018-06-07 DIAGNOSIS — E11.9 TYPE 2 DIABETES MELLITUS WITHOUT COMPLICATION, WITHOUT LONG-TERM CURRENT USE OF INSULIN (HCC): Primary | ICD-10-CM

## 2018-06-07 DIAGNOSIS — M50.123 CERVICAL DISC DISORDER AT C6-C7 LEVEL WITH RADICULOPATHY: ICD-10-CM

## 2018-06-07 DIAGNOSIS — E78.2 MIXED HYPERLIPIDEMIA: ICD-10-CM

## 2018-06-07 PROCEDURE — 99214 OFFICE O/P EST MOD 30 MIN: CPT | Performed by: FAMILY MEDICINE

## 2018-06-07 RX ORDER — SIMVASTATIN 40 MG
TABLET ORAL
Qty: 90 TABLET | Refills: 3 | Status: SHIPPED | OUTPATIENT
Start: 2018-06-07 | End: 2019-05-07 | Stop reason: SDUPTHER

## 2018-06-07 RX ORDER — LISINOPRIL 20 MG/1
TABLET ORAL
Qty: 90 TABLET | Refills: 3 | Status: SHIPPED | OUTPATIENT
Start: 2018-06-07 | End: 2019-05-07 | Stop reason: SDUPTHER

## 2018-06-07 RX ORDER — IBUPROFEN 200 MG
800 TABLET ORAL EVERY 6 HOURS PRN
COMMUNITY
End: 2018-10-14 | Stop reason: ALTCHOICE

## 2018-06-07 ASSESSMENT — ENCOUNTER SYMPTOMS
EYES NEGATIVE: 1
BACK PAIN: 0
CONSTIPATION: 0
DIARRHEA: 0
ALLERGIC/IMMUNOLOGIC NEGATIVE: 1
GASTROINTESTINAL NEGATIVE: 1
NAUSEA: 0
RESPIRATORY NEGATIVE: 1

## 2018-06-28 ENCOUNTER — HOSPITAL ENCOUNTER (OUTPATIENT)
Dept: LAB | Age: 63
Setting detail: SPECIMEN
Discharge: HOME | End: 2018-06-28
Payer: COMMERCIAL

## 2018-06-28 DIAGNOSIS — E78.2 MIXED HYPERLIPIDEMIA: ICD-10-CM

## 2018-06-28 DIAGNOSIS — I10 ESSENTIAL HYPERTENSION: ICD-10-CM

## 2018-06-28 DIAGNOSIS — E11.9 TYPE 2 DIABETES MELLITUS WITHOUT COMPLICATION, WITHOUT LONG-TERM CURRENT USE OF INSULIN (HCC): ICD-10-CM

## 2018-06-28 LAB
ABSOLUTE EOS #: 0.2 K/UL (ref 0–0.4)
ABSOLUTE IMMATURE GRANULOCYTE: NORMAL K/UL (ref 0–0.3)
ABSOLUTE LYMPH #: 2.7 K/UL (ref 1–4.8)
ABSOLUTE MONO #: 0.4 K/UL (ref 0.1–1.2)
ALBUMIN SERPL-MCNC: 4.2 G/DL (ref 3.5–5.2)
ALBUMIN/GLOBULIN RATIO: 1.5 (ref 1–2.5)
ALP BLD-CCNC: 110 U/L (ref 35–104)
ALT SERPL-CCNC: 19 U/L (ref 5–33)
ANION GAP SERPL CALCULATED.3IONS-SCNC: 13 MMOL/L (ref 9–17)
AST SERPL-CCNC: 15 U/L
BASOPHILS # BLD: 1 % (ref 0–1)
BASOPHILS ABSOLUTE: 0 K/UL (ref 0–0.2)
BILIRUB SERPL-MCNC: 0.3 MG/DL (ref 0.3–1.2)
BUN BLDV-MCNC: 12 MG/DL (ref 8–23)
BUN/CREAT BLD: 18 (ref 9–20)
CALCIUM SERPL-MCNC: 9.4 MG/DL (ref 8.6–10.4)
CARCINOEMBRYONIC ANTIGEN: 1.4 NG/ML
CHLORIDE BLD-SCNC: 102 MMOL/L (ref 98–107)
CO2: 28 MMOL/L (ref 20–31)
CREAT SERPL-MCNC: 0.66 MG/DL (ref 0.5–0.9)
DIFFERENTIAL TYPE: NORMAL
EOSINOPHILS RELATIVE PERCENT: 3 % (ref 1–7)
GFR AFRICAN AMERICAN: >60 ML/MIN
GFR NON-AFRICAN AMERICAN: >60 ML/MIN
GFR SERPL CREATININE-BSD FRML MDRD: ABNORMAL ML/MIN/{1.73_M2}
GFR SERPL CREATININE-BSD FRML MDRD: ABNORMAL ML/MIN/{1.73_M2}
GLUCOSE BLD-MCNC: 153 MG/DL (ref 70–99)
HCT VFR BLD CALC: 39.6 % (ref 36–46)
HEMOGLOBIN: 13.6 G/DL (ref 12–16)
IMMATURE GRANULOCYTES: NORMAL %
LYMPHOCYTES # BLD: 36 % (ref 16–46)
MCH RBC QN AUTO: 28.2 PG (ref 26–34)
MCHC RBC AUTO-ENTMCNC: 34.3 G/DL (ref 31–37)
MCV RBC AUTO: 82.2 FL (ref 80–100)
MONOCYTES # BLD: 6 % (ref 4–11)
NRBC AUTOMATED: NORMAL PER 100 WBC
PDW BLD-RTO: 14.1 % (ref 11–14.5)
PLATELET # BLD: 255 K/UL (ref 140–450)
PLATELET ESTIMATE: NORMAL
PMV BLD AUTO: 7.9 FL (ref 6–12)
POTASSIUM SERPL-SCNC: 4 MMOL/L (ref 3.7–5.3)
RBC # BLD: 4.82 M/UL (ref 4–5.2)
RBC # BLD: NORMAL 10*6/UL
SEG NEUTROPHILS: 54 % (ref 43–77)
SEGMENTED NEUTROPHILS ABSOLUTE COUNT: 4 K/UL (ref 1.8–7.7)
SODIUM BLD-SCNC: 143 MMOL/L (ref 135–144)
TOTAL PROTEIN: 7 G/DL (ref 6.4–8.3)
WBC # BLD: 7.3 K/UL (ref 3.5–11)
WBC # BLD: NORMAL 10*3/UL

## 2018-06-28 PROCEDURE — 80053 COMPREHEN METABOLIC PANEL: CPT

## 2018-06-28 PROCEDURE — 86300 IMMUNOASSAY TUMOR CA 15-3: CPT

## 2018-06-28 PROCEDURE — 82378 CARCINOEMBRYONIC ANTIGEN: CPT

## 2018-06-28 PROCEDURE — 85025 COMPLETE CBC W/AUTO DIFF WBC: CPT

## 2018-06-28 PROCEDURE — 36415 COLL VENOUS BLD VENIPUNCTURE: CPT

## 2018-07-03 LAB — CA 15-3: 22 U/ML (ref 0–31)

## 2018-07-11 ENCOUNTER — HOSPITAL ENCOUNTER (OUTPATIENT)
Dept: GENERAL RADIOLOGY | Age: 63
Discharge: HOME | End: 2018-07-13
Payer: COMMERCIAL

## 2018-07-11 DIAGNOSIS — Z98.1 S/P CERVICAL SPINAL FUSION: Primary | ICD-10-CM

## 2018-07-11 DIAGNOSIS — Z98.1 S/P CERVICAL SPINAL FUSION: ICD-10-CM

## 2018-07-11 PROCEDURE — 72050 X-RAY EXAM NECK SPINE 4/5VWS: CPT

## 2018-07-13 ENCOUNTER — OFFICE VISIT (OUTPATIENT)
Dept: NEUROSURGERY | Age: 63
End: 2018-07-13

## 2018-07-13 VITALS — WEIGHT: 207 LBS | SYSTOLIC BLOOD PRESSURE: 138 MMHG | DIASTOLIC BLOOD PRESSURE: 76 MMHG | BODY MASS INDEX: 32.41 KG/M2

## 2018-07-13 DIAGNOSIS — G99.2 STENOSIS OF CERVICAL SPINE WITH MYELOPATHY (HCC): Primary | ICD-10-CM

## 2018-07-13 DIAGNOSIS — M48.02 STENOSIS OF CERVICAL SPINE WITH MYELOPATHY (HCC): Primary | ICD-10-CM

## 2018-07-13 PROCEDURE — 99024 POSTOP FOLLOW-UP VISIT: CPT | Performed by: NEUROLOGICAL SURGERY

## 2018-07-13 RX ORDER — PREDNISONE 20 MG/1
40 TABLET ORAL DAILY
Qty: 10 TABLET | Refills: 0 | Status: SHIPPED | OUTPATIENT
Start: 2018-07-18 | End: 2018-07-23

## 2018-07-13 RX ORDER — PREDNISONE 20 MG/1
20 TABLET ORAL DAILY
Qty: 5 TABLET | Refills: 0 | Status: SHIPPED | OUTPATIENT
Start: 2018-07-23 | End: 2018-07-28

## 2018-07-13 RX ORDER — PREDNISONE 20 MG/1
60 TABLET ORAL DAILY
Qty: 15 TABLET | Refills: 0 | Status: SHIPPED | OUTPATIENT
Start: 2018-07-13 | End: 2018-07-18

## 2018-07-13 NOTE — PROGRESS NOTES
Resolved neck and RUE pain.  Initially resolved proximal LUE pain mid humerus to past elbow but not obviously dermatomal    Intact on motor sensory exam. Healed incision    S/p C4/5 and C5/6 arthroplasty    Prednisone taper  counselled on healing of neuropathic sx and time frame  F/u 8wks with flex ext cervical xrays

## 2018-08-07 ENCOUNTER — TELEPHONE (OUTPATIENT)
Dept: NEUROSURGERY | Age: 63
End: 2018-08-07

## 2018-08-07 RX ORDER — GABAPENTIN 100 MG/1
100 CAPSULE ORAL 3 TIMES DAILY
Qty: 42 CAPSULE | Refills: 3 | Status: SHIPPED | OUTPATIENT
Start: 2018-08-07 | End: 2019-02-11 | Stop reason: ALTCHOICE

## 2018-09-11 ENCOUNTER — HOSPITAL ENCOUNTER (OUTPATIENT)
Dept: GENERAL RADIOLOGY | Age: 63
Discharge: HOME | End: 2018-09-13
Payer: COMMERCIAL

## 2018-09-11 DIAGNOSIS — M48.02 STENOSIS OF CERVICAL SPINE WITH MYELOPATHY (HCC): ICD-10-CM

## 2018-09-11 DIAGNOSIS — G99.2 STENOSIS OF CERVICAL SPINE WITH MYELOPATHY (HCC): ICD-10-CM

## 2018-09-11 PROCEDURE — 72040 X-RAY EXAM NECK SPINE 2-3 VW: CPT

## 2018-09-17 ENCOUNTER — OFFICE VISIT (OUTPATIENT)
Dept: NEUROSURGERY | Age: 63
End: 2018-09-17
Payer: COMMERCIAL

## 2018-09-17 VITALS
BODY MASS INDEX: 32.1 KG/M2 | WEIGHT: 205 LBS | HEART RATE: 75 BPM | DIASTOLIC BLOOD PRESSURE: 104 MMHG | SYSTOLIC BLOOD PRESSURE: 188 MMHG

## 2018-09-17 DIAGNOSIS — M54.12 CERVICAL RADICULOPATHY: ICD-10-CM

## 2018-09-17 DIAGNOSIS — G99.2 STENOSIS OF CERVICAL SPINE WITH MYELOPATHY (HCC): Primary | ICD-10-CM

## 2018-09-17 DIAGNOSIS — M48.02 STENOSIS OF CERVICAL SPINE WITH MYELOPATHY (HCC): Primary | ICD-10-CM

## 2018-09-17 PROCEDURE — 99213 OFFICE O/P EST LOW 20 MIN: CPT | Performed by: NEUROLOGICAL SURGERY

## 2018-10-14 ENCOUNTER — HOSPITAL ENCOUNTER (OUTPATIENT)
Age: 63
Setting detail: SPECIMEN
Discharge: HOME | End: 2018-10-14
Payer: COMMERCIAL

## 2018-10-14 ENCOUNTER — OFFICE VISIT (OUTPATIENT)
Dept: PRIMARY CARE CLINIC | Age: 63
End: 2018-10-14
Payer: COMMERCIAL

## 2018-10-14 VITALS
DIASTOLIC BLOOD PRESSURE: 88 MMHG | TEMPERATURE: 98.8 F | HEIGHT: 67 IN | WEIGHT: 202 LBS | BODY MASS INDEX: 31.71 KG/M2 | SYSTOLIC BLOOD PRESSURE: 170 MMHG | RESPIRATION RATE: 20 BRPM | HEART RATE: 80 BPM

## 2018-10-14 DIAGNOSIS — R30.0 DYSURIA: ICD-10-CM

## 2018-10-14 DIAGNOSIS — R81 GLUCOSURIA: ICD-10-CM

## 2018-10-14 DIAGNOSIS — B37.31 YEAST VAGINITIS: Primary | ICD-10-CM

## 2018-10-14 DIAGNOSIS — R03.0 ELEVATED BLOOD PRESSURE READING: ICD-10-CM

## 2018-10-14 DIAGNOSIS — B37.31 YEAST VAGINITIS: ICD-10-CM

## 2018-10-14 LAB
-: ABNORMAL
AMORPHOUS: ABNORMAL
BACTERIA: ABNORMAL
BILIRUBIN URINE: NEGATIVE
CASTS UA: ABNORMAL /LPF (ref 0–2)
COLOR: ABNORMAL
COMMENT UA: ABNORMAL
CRYSTALS, UA: ABNORMAL /HPF
EPITHELIAL CELLS UA: ABNORMAL /HPF (ref 0–5)
GLUCOSE URINE: ABNORMAL
KETONES, URINE: ABNORMAL
LEUKOCYTE ESTERASE, URINE: NEGATIVE
MUCUS: ABNORMAL
NITRITE, URINE: NEGATIVE
OTHER OBSERVATIONS UA: ABNORMAL
PH UA: 5.5 (ref 5–6)
PROTEIN UA: NEGATIVE
RBC UA: ABNORMAL /HPF (ref 0–4)
RENAL EPITHELIAL, UA: ABNORMAL /HPF
SPECIFIC GRAVITY UA: 1.02 (ref 1.01–1.02)
TRICHOMONAS: ABNORMAL
TURBIDITY: ABNORMAL
URINE HGB: ABNORMAL
UROBILINOGEN, URINE: NORMAL
WBC UA: ABNORMAL /HPF (ref 0–4)
YEAST: ABNORMAL

## 2018-10-14 PROCEDURE — 87510 GARDNER VAG DNA DIR PROBE: CPT

## 2018-10-14 PROCEDURE — 87660 TRICHOMONAS VAGIN DIR PROBE: CPT

## 2018-10-14 PROCEDURE — 99214 OFFICE O/P EST MOD 30 MIN: CPT | Performed by: FAMILY MEDICINE

## 2018-10-14 PROCEDURE — 87086 URINE CULTURE/COLONY COUNT: CPT

## 2018-10-14 PROCEDURE — 87480 CANDIDA DNA DIR PROBE: CPT

## 2018-10-14 PROCEDURE — 81001 URINALYSIS AUTO W/SCOPE: CPT

## 2018-10-14 RX ORDER — FLUCONAZOLE 150 MG/1
TABLET ORAL
Qty: 2 TABLET | Refills: 1 | Status: SHIPPED | OUTPATIENT
Start: 2018-10-14 | End: 2019-02-11

## 2018-10-14 ASSESSMENT — ENCOUNTER SYMPTOMS
EYES NEGATIVE: 1
RESPIRATORY NEGATIVE: 1
GASTROINTESTINAL NEGATIVE: 1

## 2018-10-14 NOTE — PROGRESS NOTES
(E11.9) Yes Caridad Hein MD   Glucose Blood (BLOOD GLUCOSE TEST STRIPS) STRP Monitor blood glucose levels once daily and as needed for hyper/hypoglycemic symptoms. Dx. Diabetes (E11.9) Yes Caridad Hein MD   Lancets MISC Monitor blood glucose levels once daily and as needed for hyper/hypoglycemic symptoms. Dx. Diabetes (E11.9) Yes Caridad Hein MD   nitroGLYCERIN (NITROSTAT) 0.4 MG SL tablet Place 1 tablet under the tongue every 5 minutes as needed for Chest pain Yes Ralph Savage   gabapentin (NEURONTIN) 100 MG capsule Take 1 capsule by mouth 3 times daily for 56 days. Reyna Medrano DO        Social History   Substance Use Topics    Smoking status: Never Smoker    Smokeless tobacco: Never Used      Comment: kandis 7/29/15    Alcohol use 0.0 oz/week      Comment: rarely        Vitals:    10/14/18 1547 10/14/18 1551   BP: (!) 168/92 (!) 170/88   Site: Left Upper Arm Left Upper Arm   Position: Sitting Sitting   Cuff Size: Large Adult Large Adult   Pulse: 80    Resp: 20    Temp: 98.8 °F (37.1 °C)    TempSrc: Tympanic    Weight: 202 lb (91.6 kg)    Height: 5' 7\" (1.702 m)      Estimated body mass index is 31.64 kg/m² as calculated from the following:    Height as of this encounter: 5' 7\" (1.702 m). Weight as of this encounter: 202 lb (91.6 kg). Physical Exam   Constitutional: She is oriented to person, place, and time. She appears well-developed and well-nourished. No distress. HENT:   Head: Normocephalic and atraumatic. Eyes: Conjunctivae are normal. Right eye exhibits no discharge. Left eye exhibits no discharge. Neck: Normal range of motion. Neck supple. Cardiovascular: Normal rate and regular rhythm. Pulmonary/Chest: Effort normal and breath sounds normal. No respiratory distress. She has no wheezes. Abdominal: Soft. Bowel sounds are normal. She exhibits no distension and no mass. There is tenderness (suprapubic). There is no rebound and no guarding.    Genitourinary: Genitourinary Comments: Speculum exam is performed today. There is slight erythema to the external vulvar region. There is slight white exudate noted. Speculum exam reveals pink vaginal mucosa with slight white thick discharge. Vaginitis culture is obtained. Lymphadenopathy:     She has no cervical adenopathy. Neurological: She is alert and oriented to person, place, and time. Skin: Skin is warm and dry. She is not diaphoretic. Psychiatric: She has a normal mood and affect. Her behavior is normal. Judgment and thought content normal.   Nursing note and vitals reviewed. ASSESSMENT/PLAN:  Encounter Diagnoses   Name Primary?  Yeast vaginitis Yes    Dysuria     Elevated blood pressure reading     Glucosuria      Orders Placed This Encounter   Procedures    Urine Culture     Standing Status:   Future     Number of Occurrences:   1     Standing Expiration Date:   10/14/2018     Order Specific Question:   Specify (ex-cath, midstream, cysto, etc)? Answer:   midstream    VAGINITIS DNA PROBE     Standing Status:   Future     Number of Occurrences:   1     Standing Expiration Date:   10/14/2019    Urinalysis Reflex to Culture     Standing Status:   Future     Number of Occurrences:   1     Standing Expiration Date:   10/14/2018     Order Specific Question:   SPECIFY(EX-CATH,MIDSTREAM,CYSTO,ETC)? Answer:   midstream     UA is reviewed and does show 3+ glucose, but no obvious infection. Suspect yeast vaginitis as etiology based on clinical evaluation. Will treat with diflucan and await vaginitis culture results. This may be due to glucosuria. Did discuss the glucosuria with patient. Concerned that her blood sugars may be running high. She does check her blood sugars at home and they are running high. Did advise low carb/low sugar diet and increased exercise. Patient is advised to follow up with PCP as scheduled for follow up of this issue. Last HgBA1c was 6.6 in May.     Blood

## 2018-10-15 LAB
DIRECT EXAM: NORMAL
Lab: NORMAL
SPECIMEN DESCRIPTION: NORMAL
STATUS: NORMAL

## 2018-10-16 LAB
CULTURE: NORMAL
Lab: NORMAL
SPECIMEN DESCRIPTION: NORMAL
STATUS: NORMAL

## 2018-11-07 RX ORDER — GLIMEPIRIDE 2 MG/1
2 TABLET ORAL EVERY MORNING
Qty: 90 TABLET | Refills: 0 | Status: SHIPPED | OUTPATIENT
Start: 2018-11-07 | End: 2019-01-25 | Stop reason: SDUPTHER

## 2018-11-07 NOTE — TELEPHONE ENCOUNTER
Per progress notes from appointment on 6/7/18:  \"diabetes  We increased metformin to 1000mg bid with meals and added  amaryl 2 mg/day. Some diarrhea with metformin, but tolerating this at present. She stopped the amaryl in the hospital, using insulin instead. amaryl not restarted at discharge . bs currently doing fairly well. Can cont. On metformin bid and add back amaryl if bs readings going up over 180. Eye exam coming up. \"  Verified with patient, she has restarted glimepiride 2 mg daily. Last OV 6/7/18, next OV 12/14/18.

## 2018-12-12 ENCOUNTER — HOSPITAL ENCOUNTER (OUTPATIENT)
Dept: LAB | Age: 63
Discharge: HOME | End: 2018-12-12
Payer: COMMERCIAL

## 2018-12-12 DIAGNOSIS — I10 ESSENTIAL HYPERTENSION: ICD-10-CM

## 2018-12-12 DIAGNOSIS — E11.8 TYPE 2 DIABETES MELLITUS WITH COMPLICATION, WITHOUT LONG-TERM CURRENT USE OF INSULIN (HCC): ICD-10-CM

## 2018-12-12 DIAGNOSIS — E11.8 TYPE 2 DIABETES MELLITUS WITH COMPLICATION, WITHOUT LONG-TERM CURRENT USE OF INSULIN (HCC): Primary | ICD-10-CM

## 2018-12-12 DIAGNOSIS — E11.9 TYPE 2 DIABETES MELLITUS WITHOUT COMPLICATION, WITHOUT LONG-TERM CURRENT USE OF INSULIN (HCC): ICD-10-CM

## 2018-12-12 LAB
ABSOLUTE EOS #: 0.1 K/UL (ref 0–0.4)
ABSOLUTE IMMATURE GRANULOCYTE: NORMAL K/UL (ref 0–0.3)
ABSOLUTE LYMPH #: 2.5 K/UL (ref 1–4.8)
ABSOLUTE MONO #: 0.5 K/UL (ref 0.1–1.2)
ALT SERPL-CCNC: 19 U/L (ref 5–33)
ANION GAP SERPL CALCULATED.3IONS-SCNC: 11 MMOL/L (ref 9–17)
BASOPHILS # BLD: 1 % (ref 0–1)
BASOPHILS ABSOLUTE: 0 K/UL (ref 0–0.2)
BUN BLDV-MCNC: 15 MG/DL (ref 8–23)
BUN/CREAT BLD: 23 (ref 9–20)
CALCIUM SERPL-MCNC: 9.2 MG/DL (ref 8.6–10.4)
CHLORIDE BLD-SCNC: 104 MMOL/L (ref 98–107)
CHOLESTEROL, FASTING: 188 MG/DL
CHOLESTEROL/HDL RATIO: 4.2
CO2: 27 MMOL/L (ref 20–31)
CREAT SERPL-MCNC: 0.65 MG/DL (ref 0.5–0.9)
CREATININE URINE: 176.2 MG/DL (ref 28–217)
DIFFERENTIAL TYPE: NORMAL
EOSINOPHILS RELATIVE PERCENT: 2 % (ref 1–7)
ESTIMATED AVERAGE GLUCOSE: 194 MG/DL
GFR AFRICAN AMERICAN: >60 ML/MIN
GFR NON-AFRICAN AMERICAN: >60 ML/MIN
GFR SERPL CREATININE-BSD FRML MDRD: ABNORMAL ML/MIN/{1.73_M2}
GFR SERPL CREATININE-BSD FRML MDRD: ABNORMAL ML/MIN/{1.73_M2}
GLUCOSE BLD-MCNC: 146 MG/DL (ref 70–99)
HBA1C MFR BLD: 8.4 % (ref 4.8–5.9)
HCT VFR BLD CALC: 41.5 % (ref 36–46)
HDLC SERPL-MCNC: 45 MG/DL
HEMOGLOBIN: 13.9 G/DL (ref 12–16)
IMMATURE GRANULOCYTES: NORMAL %
LDL CHOLESTEROL: 113 MG/DL (ref 0–130)
LYMPHOCYTES # BLD: 34 % (ref 16–46)
MCH RBC QN AUTO: 27.1 PG (ref 26–34)
MCHC RBC AUTO-ENTMCNC: 33.6 G/DL (ref 31–37)
MCV RBC AUTO: 80.5 FL (ref 80–100)
MICROALBUMIN/CREAT 24H UR: 17 MG/L
MICROALBUMIN/CREAT UR-RTO: 10 MCG/MG CREAT
MONOCYTES # BLD: 6 % (ref 4–11)
NRBC AUTOMATED: NORMAL PER 100 WBC
PDW BLD-RTO: 13.9 % (ref 11–14.5)
PLATELET # BLD: 261 K/UL (ref 140–450)
PLATELET ESTIMATE: NORMAL
PMV BLD AUTO: 8.1 FL (ref 6–12)
POTASSIUM SERPL-SCNC: 4.1 MMOL/L (ref 3.7–5.3)
RBC # BLD: 5.15 M/UL (ref 4–5.2)
RBC # BLD: NORMAL 10*6/UL
SEG NEUTROPHILS: 57 % (ref 43–77)
SEGMENTED NEUTROPHILS ABSOLUTE COUNT: 4.3 K/UL (ref 1.8–7.7)
SODIUM BLD-SCNC: 142 MMOL/L (ref 135–144)
TRIGLYCERIDE, FASTING: 151 MG/DL
VLDLC SERPL CALC-MCNC: ABNORMAL MG/DL (ref 1–30)
WBC # BLD: 7.5 K/UL (ref 3.5–11)
WBC # BLD: NORMAL 10*3/UL

## 2018-12-12 PROCEDURE — 84460 ALANINE AMINO (ALT) (SGPT): CPT

## 2018-12-12 PROCEDURE — 83036 HEMOGLOBIN GLYCOSYLATED A1C: CPT

## 2018-12-12 PROCEDURE — 80061 LIPID PANEL: CPT

## 2018-12-12 PROCEDURE — 82043 UR ALBUMIN QUANTITATIVE: CPT

## 2018-12-12 PROCEDURE — 85025 COMPLETE CBC W/AUTO DIFF WBC: CPT

## 2018-12-12 PROCEDURE — 80048 BASIC METABOLIC PNL TOTAL CA: CPT

## 2018-12-12 PROCEDURE — 36415 COLL VENOUS BLD VENIPUNCTURE: CPT

## 2018-12-12 PROCEDURE — 82570 ASSAY OF URINE CREATININE: CPT

## 2018-12-18 ENCOUNTER — TELEPHONE (OUTPATIENT)
Dept: FAMILY MEDICINE CLINIC | Age: 63
End: 2018-12-18

## 2018-12-20 RX ORDER — METFORMIN HYDROCHLORIDE 750 MG/1
750 TABLET, EXTENDED RELEASE ORAL EVERY 12 HOURS
Qty: 180 TABLET | Refills: 3 | Status: SHIPPED | OUTPATIENT
Start: 2018-12-20 | End: 2019-08-19

## 2019-01-25 RX ORDER — GLIMEPIRIDE 2 MG/1
TABLET ORAL
Qty: 90 TABLET | Refills: 0 | Status: SHIPPED | OUTPATIENT
Start: 2019-01-25 | End: 2019-02-11 | Stop reason: SDUPTHER

## 2019-02-11 ENCOUNTER — OFFICE VISIT (OUTPATIENT)
Dept: FAMILY MEDICINE CLINIC | Age: 64
End: 2019-02-11
Payer: COMMERCIAL

## 2019-02-11 VITALS
HEIGHT: 67 IN | HEART RATE: 68 BPM | DIASTOLIC BLOOD PRESSURE: 80 MMHG | BODY MASS INDEX: 32.65 KG/M2 | WEIGHT: 208 LBS | SYSTOLIC BLOOD PRESSURE: 138 MMHG

## 2019-02-11 DIAGNOSIS — I10 ESSENTIAL HYPERTENSION: ICD-10-CM

## 2019-02-11 DIAGNOSIS — E66.09 CLASS 1 OBESITY DUE TO EXCESS CALORIES WITH SERIOUS COMORBIDITY AND BODY MASS INDEX (BMI) OF 32.0 TO 32.9 IN ADULT: ICD-10-CM

## 2019-02-11 DIAGNOSIS — M50.123 CERVICAL DISC DISORDER AT C6-C7 LEVEL WITH RADICULOPATHY: ICD-10-CM

## 2019-02-11 DIAGNOSIS — M85.88 OSTEOPENIA OF LUMBAR SPINE: ICD-10-CM

## 2019-02-11 DIAGNOSIS — C64.9 RENAL CELL CARCINOMA, UNSPECIFIED LATERALITY (HCC): ICD-10-CM

## 2019-02-11 DIAGNOSIS — E11.8 TYPE 2 DIABETES MELLITUS WITH COMPLICATION, WITHOUT LONG-TERM CURRENT USE OF INSULIN (HCC): Primary | ICD-10-CM

## 2019-02-11 DIAGNOSIS — K21.9 GASTROESOPHAGEAL REFLUX DISEASE WITHOUT ESOPHAGITIS: ICD-10-CM

## 2019-02-11 DIAGNOSIS — E78.2 MIXED HYPERLIPIDEMIA: ICD-10-CM

## 2019-02-11 PROCEDURE — 99214 OFFICE O/P EST MOD 30 MIN: CPT | Performed by: FAMILY MEDICINE

## 2019-02-11 RX ORDER — GLIMEPIRIDE 2 MG/1
TABLET ORAL
Qty: 180 TABLET | Refills: 3 | Status: SHIPPED | OUTPATIENT
Start: 2019-02-11 | End: 2019-02-11 | Stop reason: SDUPTHER

## 2019-02-11 RX ORDER — GLIMEPIRIDE 2 MG/1
TABLET ORAL
Qty: 180 TABLET | Refills: 3 | Status: SHIPPED | OUTPATIENT
Start: 2019-02-11 | End: 2019-08-19 | Stop reason: SDUPTHER

## 2019-02-11 ASSESSMENT — PATIENT HEALTH QUESTIONNAIRE - PHQ9
2. FEELING DOWN, DEPRESSED OR HOPELESS: 0
SUM OF ALL RESPONSES TO PHQ QUESTIONS 1-9: 0
SUM OF ALL RESPONSES TO PHQ9 QUESTIONS 1 & 2: 0
SUM OF ALL RESPONSES TO PHQ QUESTIONS 1-9: 0
1. LITTLE INTEREST OR PLEASURE IN DOING THINGS: 0

## 2019-02-11 ASSESSMENT — ENCOUNTER SYMPTOMS
RESPIRATORY NEGATIVE: 1
ALLERGIC/IMMUNOLOGIC NEGATIVE: 1
DIARRHEA: 1
BACK PAIN: 0
NAUSEA: 0
EYES NEGATIVE: 1
CONSTIPATION: 0

## 2019-03-01 ENCOUNTER — TELEPHONE (OUTPATIENT)
Dept: FAMILY MEDICINE CLINIC | Age: 64
End: 2019-03-01

## 2019-03-01 RX ORDER — SULFAMETHOXAZOLE AND TRIMETHOPRIM 800; 160 MG/1; MG/1
1 TABLET ORAL 2 TIMES DAILY
Qty: 14 TABLET | Refills: 0 | Status: SHIPPED | OUTPATIENT
Start: 2019-03-01 | End: 2019-03-08

## 2019-04-08 ENCOUNTER — HOSPITAL ENCOUNTER (OUTPATIENT)
Dept: MRI IMAGING | Age: 64
Discharge: HOME | End: 2019-04-10
Payer: COMMERCIAL

## 2019-04-08 DIAGNOSIS — M25.512 ACUTE PAIN OF LEFT SHOULDER: ICD-10-CM

## 2019-04-08 PROCEDURE — 73221 MRI JOINT UPR EXTREM W/O DYE: CPT

## 2019-04-25 RX ORDER — GLIMEPIRIDE 2 MG/1
TABLET ORAL
Qty: 90 TABLET | Refills: 0 | Status: SHIPPED | OUTPATIENT
Start: 2019-04-25 | End: 2019-05-07 | Stop reason: SDUPTHER

## 2019-05-02 LAB
ALBUMIN: 4.2 G/DL (ref 3.5–5)
ALP BLD-CCNC: 124 U/L (ref 45–117)
ALT SERPL-CCNC: 17 U/L (ref 12–78)
APPEARANCE: CLEAR
AST SERPL-CCNC: 13 U/L (ref 15–37)
BILIRUB SERPL-MCNC: 0.6 MG/DL (ref 0–1)
BILIRUBIN, URINE: NEGATIVE
BUN BLDV-MCNC: 11 MG/DL (ref 7–18)
CALCIUM SERPL-MCNC: 9.6 MG/DL (ref 8.5–10.1)
CHLORIDE BLD-SCNC: 105 MMOL/L (ref 97–107)
CO2: 26 MMOL/L (ref 21–32)
COLOR, URINE: YELLOW
CREAT SERPL-MCNC: 0.8 MG/DL (ref 0.55–1.02)
EPITHELIAL CELLS, UA: NORMAL /LPF
GFR CALCULATED: > 60
GLUCOSE URINE: NEGATIVE G/DL
GLUCOSE: 169 MG/DL (ref 70–99)
HCT VFR BLD CALC: 42.2 % (ref 34.6–44.1)
HEMOGLOBIN: 14.3 G/DL (ref 11.7–14.9)
INR BLD: 0.98 (ref 0.86–1.15)
KETONES, URINE: NEGATIVE MG/DL
LEUKOCYTE ESTERASE, URINE: NEGATIVE
MCH RBC QN AUTO: 27.1 PG (ref 27.8–33.2)
MCHC RBC AUTO-ENTMCNC: 33.9 G/DL (ref 32.7–34.8)
MCV RBC AUTO: 80 FL (ref 83–97.4)
NITRITE, URINE: NEGATIVE
OCCULT BLOOD,URINE: NEGATIVE
PARTIAL THROMBOPLASTIN TIME: 26.2 SECONDS (ref 24–34)
PDW BLD-RTO: 13.5 % (ref 12.2–15.8)
PH, URINE: 5.5 (ref 5.5–8)
PLATELET # BLD: 262 10'3/UL (ref 122–359)
PMV BLD AUTO: 7.7 FL (ref 7.6–10.6)
POTASSIUM SERPL-SCNC: 4.2 MMOL/L (ref 3.5–5.1)
PROTEIN UA: NEGATIVE MG/DL
PROTHROMBIN TIME: 10 SECONDS (ref 9.3–12.3)
RBC # BLD: 5.27 10'6/UL (ref 3.85–4.88)
SODIUM BLD-SCNC: 141 MMOL/L (ref 136–145)
SPECIFIC GRAVITY, URINE: 1.01 (ref 1–1.03)
TOTAL PROTEIN: 7 G/DL (ref 6.4–8.2)
UROBILINOGEN, URINE: 0.2 EU/DL (ref 0.1–1)
WBC UA: NORMAL /HPF
WBC: 7.6 10'3/UL (ref 3.2–9.3)

## 2019-05-07 ENCOUNTER — OFFICE VISIT (OUTPATIENT)
Dept: FAMILY MEDICINE CLINIC | Age: 64
End: 2019-05-07
Payer: COMMERCIAL

## 2019-05-07 ENCOUNTER — TELEPHONE (OUTPATIENT)
Dept: FAMILY MEDICINE CLINIC | Age: 64
End: 2019-05-07

## 2019-05-07 VITALS
SYSTOLIC BLOOD PRESSURE: 112 MMHG | HEIGHT: 67 IN | HEART RATE: 68 BPM | BODY MASS INDEX: 31.86 KG/M2 | WEIGHT: 203 LBS | DIASTOLIC BLOOD PRESSURE: 70 MMHG

## 2019-05-07 DIAGNOSIS — M25.512 CHRONIC LEFT SHOULDER PAIN: ICD-10-CM

## 2019-05-07 DIAGNOSIS — Z01.818 PREOPERATIVE GENERAL PHYSICAL EXAMINATION: Primary | ICD-10-CM

## 2019-05-07 DIAGNOSIS — G89.29 CHRONIC LEFT SHOULDER PAIN: ICD-10-CM

## 2019-05-07 DIAGNOSIS — E11.8 TYPE 2 DIABETES MELLITUS WITH COMPLICATION, WITHOUT LONG-TERM CURRENT USE OF INSULIN (HCC): ICD-10-CM

## 2019-05-07 PROCEDURE — 99214 OFFICE O/P EST MOD 30 MIN: CPT | Performed by: FAMILY MEDICINE

## 2019-05-07 RX ORDER — SIMVASTATIN 40 MG
TABLET ORAL
Qty: 90 TABLET | Refills: 3 | Status: SHIPPED | OUTPATIENT
Start: 2019-05-07 | End: 2019-08-19 | Stop reason: SDUPTHER

## 2019-05-07 RX ORDER — LISINOPRIL 20 MG/1
TABLET ORAL
Qty: 90 TABLET | Refills: 3 | Status: SHIPPED | OUTPATIENT
Start: 2019-05-07 | End: 2019-08-19 | Stop reason: SDUPTHER

## 2019-05-07 RX ORDER — LISINOPRIL 20 MG/1
TABLET ORAL
Qty: 90 TABLET | Refills: 3 | Status: SHIPPED | OUTPATIENT
Start: 2019-05-07 | End: 2019-05-07 | Stop reason: SDUPTHER

## 2019-05-07 ASSESSMENT — ENCOUNTER SYMPTOMS
ALLERGIC/IMMUNOLOGIC NEGATIVE: 1
CONSTIPATION: 0
NAUSEA: 0
DIARRHEA: 1
EYES NEGATIVE: 1
RESPIRATORY NEGATIVE: 1
BACK PAIN: 0

## 2019-05-07 ASSESSMENT — PATIENT HEALTH QUESTIONNAIRE - PHQ9
SUM OF ALL RESPONSES TO PHQ9 QUESTIONS 1 & 2: 0
2. FEELING DOWN, DEPRESSED OR HOPELESS: 0
SUM OF ALL RESPONSES TO PHQ QUESTIONS 1-9: 0
SUM OF ALL RESPONSES TO PHQ QUESTIONS 1-9: 0
1. LITTLE INTEREST OR PLEASURE IN DOING THINGS: 0

## 2019-05-07 NOTE — PROGRESS NOTES
formation.       GLENOHUMERAL JOINT: Loose body in the subcoracoid space measuring up to 1.1   cm on image 3, series 6.       Moderate osteophyte spurring at the margins of the glenohumeral joint.       Small debris containing glenohumeral joint effusion.  Inferior glenohumeral   ligament appears intact.  Moderate glenohumeral chondromalacia.       AC JOINT AND ACROMIOCLAVICULAR ARCH: Mild degenerative change of the left   acromioclavicular joint.  Type II acromion in the sagittal plane.       BONE MARROW: Subcortical cystic changes at the superolateral and anterior   humeral head.  Bone marrow signal intensity within the visualized osseous   structures is otherwise within normal limits.  No acute fracture or gross   dislocation involving the osseous components of the shoulder.  Mild   subchondral marrow edema at the superior humeral head.       OUTLET SPACES: Suprascapular notch and quadrilateral space grossly   unremarkable in appearance.       No left axillary lymphadenopathy.           Impression   1. Low-grade partial-thickness articular surface and interstitial tearing   along the entirety of supraspinatus insertion.  Moderate supraspinatus   tendinopathy. 2. Mild infraspinatus and subscapularis tendinopathy. 3. Moderate diffuse labral degeneration.  Degenerative superior labral   tearing. 4. 1.1 cm loose body in the subcoracoid space. 5. Moderate degenerative changes of the glenohumeral joint.  Moderate   glenohumeral chondromalacia.  Small debris containing glenohumeral joint   effusion.    6. Mild degenerative change of the left AC joint.         Past Medical History:   Diagnosis Date    Cancer [facility]) 2006    Right Breast cancer    Cervical radiculopathy     DDD (degenerative disc disease)     cervical    GERD (gastroesophageal reflux disease)     Hyperlipidemia     Hypertension     Impaired fasting blood sugar     Kidney stone     Myopia with astigmatism and presbyopia     Obesity     Osteopenia     Renal cell carcinoma ([facility] [address] 06/10/13    right kidney: clear cell type. Corine grade 2 of 4. 2.5cm limited to kidney    Stenosis of cervical spine with myelopathy     Stress incontinence     Wears glasses      Past Surgical History:   Procedure Laterality Date    BLADDER SUSPENSION  2011    incontinence    BREAST BIOPSY Right 11/20/2007    wire localization     CERVICAL SPINE SURGERY  05/11/2018     ANTERIOR C4-5, C5-6 ARTHROPLASTY, (ONE PLATE AND FOUR SCREWS REMOVED C6-C7 AND DISCARDED    COLONOSCOPY  05/19/2008    COLONOSCOPY  05/03/2017    [id number]    HYSTERECTOMY, TOTAL ABDOMINAL  2011    with bladder lift    MASTECTOMY Bilateral 12/11/2007    lymphnodes were also removed    NECK SURGERY  06/30/2010    cervical 6-7 microdiscectomy. Dr. Schrader Labrum Right 06/10/2013    limited to kidney    [place] OFFICE/OUTPT VISIT,PROCEDURE ONLY N/A 5/11/2018    ANTERIOR C4-5, C5-6 ARTHROPLASTY WITH , SUPINE POSITION, MICROSCOPE, C-ARM, MEDTRONIC(REP NOTIFIED), EVOKES; (ONE PLATE AND FOUR SCREWS REMOVED C6-C7 AND DISCARDED) performed by Shilpa Marion DO at [address] [place] [address] Bilateral 1988    UPPER GASTROINTESTINAL ENDOSCOPY  01/12/10    normal.  Dr. Star Anderson EXTRACTION         Review of Systems   Constitutional: Negative. Negative for fatigue and unexpected weight change. HENT: Negative. Eyes: Negative. Respiratory: Negative. Cardiovascular: Negative. Negative for chest pain. Gastrointestinal: Positive for diarrhea (intermittent, metformin related). Negative for constipation and nausea. Endocrine: Negative. Genitourinary: Negative. Negative for flank pain. Musculoskeletal: Positive for arthralgias (right shoulder, left shoulder). Negative for back pain, neck pain and neck stiffness. Skin: Negative. Allergic/Immunologic: Negative. Neurological: Negative. Hematological: Negative.     Psychiatric/Behavioral: Negative. Prior to Visit Medications    Medication Sig Taking? Authorizing Provider   glimepiride (AMARYL) 2 MG tablet Take 1 tablet by mouth twice daily with meals Yes Arash Mcneil MD   metFORMIN (GLUCOPHAGE XR) 750 MG extended release tablet Take 1 tablet by mouth every 12 hours Yes Arash Mcneil MD   metoprolol tartrate (LOPRESSOR) 25 MG tablet TAKE 1 TABLET TWICE A DAY Yes Arash Mcneil MD   simvastatin (ZOCOR) 40 MG tablet TAKE 1 TABLET NIGHTLY Yes Arash Mcneil MD   lisinopril (PRINIVIL;ZESTRIL) 20 MG tablet TAKE 1 TABLET DAILY Yes Arash Mcneil MD   acetaminophen (TYLENOL) 325 MG tablet Take 650 mg by mouth every 6 hours as needed for Pain Yes Historical Provider, MD   omeprazole (PRILOSEC) 20 MG delayed release capsule TAKE 1 CAPSULE DAILY Yes Arash Mcneil MD   Blood Glucose Monitoring Suppl ALICIA Monitor blood glucose levels once daily and as needed for hyper/hypoglycemic symptoms. Dx. Diabetes (E11.9) Yes Arash Mcneil MD   Glucose Blood (BLOOD GLUCOSE TEST STRIPS) STRP Monitor blood glucose levels once daily and as needed for hyper/hypoglycemic symptoms. Dx. Diabetes (E11.9) Yes Arash Mcneil MD   Lancets MISC Monitor blood glucose levels once daily and as needed for hyper/hypoglycemic symptoms. Dx. Diabetes (E11.9) Yes Arash Mcneil MD   nitroGLYCERIN (NITROSTAT) 0.4 MG SL tablet Place 1 tablet under the tongue every 5 minutes as needed for Chest pain Yes Linda Gonsalves        Social History     Tobacco Use    Smoking status: Never Smoker    Smokeless tobacco: Never Used    Tobacco comment: kandis 7/29/15   Substance Use Topics    Alcohol use:  Yes     Alcohol/week: 0.0 oz     Comment: rarely        Vitals:    05/07/19 1028   BP: 112/70   Site: Left Upper Arm   Position: Sitting   Cuff Size: Large Adult   Pulse: 68   Weight: 203 lb (92.1 kg)   Height: 5' 7.01\" (1.702 m)     Estimated body mass index is 31.79 kg/m² as calculated from the following:    Height as of this encounter: 5' 7.01\" (1.702 m). Weight as of this encounter: 203 lb (92.1 kg). Physical Exam   Constitutional: She is oriented to person, place, and time. She appears well-developed and well-nourished. No distress. HENT:   Head: Normocephalic and atraumatic. Right Ear: External ear normal.   Left Ear: External ear normal.   Mouth/Throat: Oropharynx is clear and moist. No oropharyngeal exudate. Eyes: Conjunctivae and EOM are normal. No scleral icterus. Neck: Neck supple. No thyromegaly present. Cardiovascular: Normal rate, regular rhythm, normal heart sounds and intact distal pulses. No murmur heard. Pulmonary/Chest: Effort normal and breath sounds normal. No respiratory distress. She has no wheezes. Abdominal: Soft. Bowel sounds are normal. She exhibits no distension. There is no tenderness. There is no rebound. Musculoskeletal: She exhibits no edema. Left shoulder: She exhibits decreased range of motion (mild decreased internal rotation) and tenderness (subacromial). Neurological: She is alert and oriented to person, place, and time. Skin: Skin is warm and dry. No rash noted. No erythema. Psychiatric: She has a normal mood and affect. Her behavior is normal. Judgment and thought content normal.   /70 (Site: Left Upper Arm, Position: Sitting, Cuff Size: Large Adult)   Pulse 68   Ht 5' 7.01\" (1.702 m)   Wt 203 lb (92.1 kg)   LMP  (LMP Unknown)   BMI 31.79 kg/m²   Sensory exam of the foot is normal, tested with the monofilament. Good pulses, no lesions or ulcers, good peripheral pulses. Preoperative labs and ekg reviewed. No concerns on review. ASSESSMENT/PLAN:  Encounter Diagnoses   Name Primary?  Preoperative general physical examination Yes    Chronic left shoulder pain      Cleared for surgery as planned. Rec. She avoid nsaids for the week prior to surgery. Rec. She take her lopressor morning of surgery with small sip of water.         An electronic signature was used to authenticate this note.     --Julia Buchanan MD on 5/7/2019 at 11:11 AM

## 2019-05-31 LAB
ALBUMIN: 3.7 G/DL (ref 3.5–5)
ALP BLD-CCNC: 118 U/L (ref 45–117)
ALT SERPL-CCNC: 17 U/L (ref 12–78)
AST SERPL-CCNC: 15 U/L (ref 15–37)
BILIRUB SERPL-MCNC: 0.3 MG/DL (ref 0–1)
BUN BLDV-MCNC: 13 MG/DL (ref 7–18)
CALCIUM SERPL-MCNC: 9.8 MG/DL (ref 8.5–10.1)
CHLORIDE BLD-SCNC: 104 MMOL/L (ref 97–107)
CO2: 23 MMOL/L (ref 21–32)
CREAT SERPL-MCNC: 0.9 MG/DL (ref 0.55–1.02)
GFR CALCULATED: > 60
GLUCOSE: 243 MG/DL (ref 70–99)
HCT VFR BLD CALC: 38.2 % (ref 34.6–44.1)
HEMOGLOBIN: 12.8 G/DL (ref 11.7–14.9)
POTASSIUM SERPL-SCNC: 4.2 MMOL/L (ref 3.5–5.1)
SODIUM BLD-SCNC: 139 MMOL/L (ref 136–145)
TOTAL PROTEIN: 6.4 G/DL (ref 6.4–8.2)

## 2019-06-03 LAB — SURGICAL PATHOLOGY REPORT: NORMAL

## 2019-08-19 ENCOUNTER — OFFICE VISIT (OUTPATIENT)
Dept: FAMILY MEDICINE CLINIC | Age: 64
End: 2019-08-19
Payer: COMMERCIAL

## 2019-08-19 VITALS
SYSTOLIC BLOOD PRESSURE: 122 MMHG | BODY MASS INDEX: 31.39 KG/M2 | HEIGHT: 67 IN | HEART RATE: 68 BPM | WEIGHT: 200 LBS | DIASTOLIC BLOOD PRESSURE: 70 MMHG

## 2019-08-19 DIAGNOSIS — E11.8 TYPE 2 DIABETES MELLITUS WITH COMPLICATION, WITHOUT LONG-TERM CURRENT USE OF INSULIN (HCC): Primary | ICD-10-CM

## 2019-08-19 DIAGNOSIS — E78.2 MIXED HYPERLIPIDEMIA: ICD-10-CM

## 2019-08-19 DIAGNOSIS — G89.29 CHRONIC LEFT SHOULDER PAIN: ICD-10-CM

## 2019-08-19 DIAGNOSIS — I10 ESSENTIAL HYPERTENSION: ICD-10-CM

## 2019-08-19 DIAGNOSIS — M25.512 CHRONIC LEFT SHOULDER PAIN: ICD-10-CM

## 2019-08-19 DIAGNOSIS — M85.88 OSTEOPENIA OF LUMBAR SPINE: ICD-10-CM

## 2019-08-19 DIAGNOSIS — E66.09 CLASS 1 OBESITY DUE TO EXCESS CALORIES WITH SERIOUS COMORBIDITY AND BODY MASS INDEX (BMI) OF 32.0 TO 32.9 IN ADULT: ICD-10-CM

## 2019-08-19 DIAGNOSIS — K21.9 GASTROESOPHAGEAL REFLUX DISEASE WITHOUT ESOPHAGITIS: ICD-10-CM

## 2019-08-19 DIAGNOSIS — M50.123 CERVICAL DISC DISORDER AT C6-C7 LEVEL WITH RADICULOPATHY: ICD-10-CM

## 2019-08-19 DIAGNOSIS — C64.9 RENAL CELL CARCINOMA, UNSPECIFIED LATERALITY (HCC): ICD-10-CM

## 2019-08-19 DIAGNOSIS — N20.0 KIDNEY STONE: ICD-10-CM

## 2019-08-19 PROCEDURE — 99214 OFFICE O/P EST MOD 30 MIN: CPT | Performed by: FAMILY MEDICINE

## 2019-08-19 RX ORDER — METFORMIN HYDROCHLORIDE 750 MG/1
1000 TABLET, EXTENDED RELEASE ORAL EVERY 12 HOURS
Qty: 180 TABLET | Refills: 3 | Status: SHIPPED | OUTPATIENT
Start: 2019-08-19 | End: 2020-06-01 | Stop reason: SDUPTHER

## 2019-08-19 RX ORDER — LISINOPRIL 20 MG/1
TABLET ORAL
Qty: 90 TABLET | Refills: 3 | Status: SHIPPED | OUTPATIENT
Start: 2019-08-19 | End: 2020-06-01 | Stop reason: SDUPTHER

## 2019-08-19 RX ORDER — SIMVASTATIN 40 MG
TABLET ORAL
Qty: 90 TABLET | Refills: 3 | Status: SHIPPED | OUTPATIENT
Start: 2019-08-19 | End: 2020-06-01 | Stop reason: SDUPTHER

## 2019-08-19 RX ORDER — GLIMEPIRIDE 2 MG/1
TABLET ORAL
Qty: 180 TABLET | Refills: 3 | Status: SHIPPED | OUTPATIENT
Start: 2019-08-19 | End: 2020-06-01 | Stop reason: SDUPTHER

## 2019-08-19 ASSESSMENT — ENCOUNTER SYMPTOMS
ALLERGIC/IMMUNOLOGIC NEGATIVE: 1
DIARRHEA: 1
BACK PAIN: 0
CONSTIPATION: 0
EYES NEGATIVE: 1
RESPIRATORY NEGATIVE: 1
NAUSEA: 0

## 2019-08-19 ASSESSMENT — PATIENT HEALTH QUESTIONNAIRE - PHQ9
SUM OF ALL RESPONSES TO PHQ QUESTIONS 1-9: 0
1. LITTLE INTEREST OR PLEASURE IN DOING THINGS: 0
2. FEELING DOWN, DEPRESSED OR HOPELESS: 0
SUM OF ALL RESPONSES TO PHQ9 QUESTIONS 1 & 2: 0
SUM OF ALL RESPONSES TO PHQ QUESTIONS 1-9: 0

## 2019-08-19 NOTE — PROGRESS NOTES
Plan:    diabetes  We increased metformin to 1000mg bid with meals and added  amaryl 2 mg/day. Some diarrhea with metformin, reducing to 750mg bid. She occasionally holds the dose for travel. She has restarted amaryl in January. a1c at 8.4% in December. increased amaryl to 2 mg bid. She relates she has continued once daily dosing over this last interval.  She increased the metformin to 1000mg bid after noticing elevations, seems to be tolerating better at present and she desires to return to the 1000mg bid dosing. Plan to recheck labs at present, increase amaryl to 2 mg bid if needed. Follow up depending on a1c and intervention needed. Htn: increased lisinopril to 20mg/day last Feb. , improved control at present. Will cont current dosing and recheck serially. Hyperlipidemia: started simvastatin 40mg after last hospitalization. Remains improved. Cont. Current dosing and recheck at follow up. Wt. Down 8 lbs. Discussed restricted carb. Diet with need for wt. Loss to help with diabetic control. Doing better at present. Cont. Same. Chronic cervicogenic pain on the left side radiating to the left arm, s/p prior fusion and C6-7. Mri with mild spinal stenosis at C5-6 and bilateral foraminal narrowing. Failed PT and conservative treatments. Now s/p  ANTERIOR C4-5, C5-6 ARTHROPLASTY, (ONE PLATE AND FOUR SCREWS REMOVED C6-C7 AND DISCARDED 5/11/18. Doing well post operatively. Pain improved. Osteopenia: updating dexa. Still pending. Renal cell carcinoma s/p right partial nephrectomy 2013. Confined to kidney. Negative PET scan. Dr. Martinez following. Gerd: quiescent on prilosec. Using prn at present. Chronic left shoulder pain. S/p reverse total shoulder replacement 5/30/19. Went well. Still in PT. Improved pain. Still working on rom and strength. Considering right shoulder replacement in the future, but not planning it this year.       Kidney

## 2020-01-20 ENCOUNTER — HOSPITAL ENCOUNTER (OUTPATIENT)
Dept: LAB | Age: 65
Discharge: HOME | End: 2020-01-20
Payer: COMMERCIAL

## 2020-01-20 LAB
ABSOLUTE EOS #: 0.1 K/UL (ref 0–0.44)
ABSOLUTE IMMATURE GRANULOCYTE: 0.07 K/UL (ref 0–0.3)
ABSOLUTE LYMPH #: 2.38 K/UL (ref 1.1–3.7)
ABSOLUTE MONO #: 0.48 K/UL (ref 0.1–1.2)
ALBUMIN SERPL-MCNC: 4.4 G/DL (ref 3.5–5.2)
ALBUMIN/GLOBULIN RATIO: 1.5 (ref 1–2.5)
ALP BLD-CCNC: 134 U/L (ref 35–104)
ALT SERPL-CCNC: 27 U/L (ref 5–33)
ANION GAP SERPL CALCULATED.3IONS-SCNC: 14 MMOL/L (ref 9–17)
AST SERPL-CCNC: 20 U/L
BASOPHILS # BLD: 0 % (ref 0–2)
BASOPHILS ABSOLUTE: 0.03 K/UL (ref 0–0.2)
BILIRUB SERPL-MCNC: 0.35 MG/DL (ref 0.3–1.2)
BUN BLDV-MCNC: 14 MG/DL (ref 8–23)
BUN/CREAT BLD: 23 (ref 9–20)
CALCIUM SERPL-MCNC: 9.8 MG/DL (ref 8.6–10.4)
CHLORIDE BLD-SCNC: 100 MMOL/L (ref 98–107)
CHOLESTEROL/HDL RATIO: 4.9
CHOLESTEROL: 222 MG/DL
CO2: 26 MMOL/L (ref 20–31)
CREAT SERPL-MCNC: 0.6 MG/DL (ref 0.5–0.9)
DIFFERENTIAL TYPE: ABNORMAL
EOSINOPHILS RELATIVE PERCENT: 1 % (ref 1–4)
ESTIMATED AVERAGE GLUCOSE: 269 MG/DL
GFR AFRICAN AMERICAN: >60 ML/MIN
GFR NON-AFRICAN AMERICAN: >60 ML/MIN
GFR SERPL CREATININE-BSD FRML MDRD: ABNORMAL ML/MIN/{1.73_M2}
GFR SERPL CREATININE-BSD FRML MDRD: ABNORMAL ML/MIN/{1.73_M2}
GLUCOSE BLD-MCNC: 195 MG/DL (ref 70–99)
HBA1C MFR BLD: 11 % (ref 4.8–5.9)
HCT VFR BLD CALC: 42.7 % (ref 36.3–47.1)
HDLC SERPL-MCNC: 45 MG/DL
HEMOGLOBIN: 14.3 G/DL (ref 11.9–15.1)
IMMATURE GRANULOCYTES: 1 %
LDL CHOLESTEROL: 126 MG/DL (ref 0–130)
LYMPHOCYTES # BLD: 29 % (ref 24–43)
MCH RBC QN AUTO: 26.9 PG (ref 25.2–33.5)
MCHC RBC AUTO-ENTMCNC: 33.5 G/DL (ref 25.2–33.5)
MCV RBC AUTO: 80.3 FL (ref 82.6–102.9)
MONOCYTES # BLD: 6 % (ref 3–12)
NRBC AUTOMATED: 0 PER 100 WBC
PDW BLD-RTO: 13.3 % (ref 11.8–14.4)
PLATELET # BLD: 282 K/UL (ref 138–453)
PLATELET ESTIMATE: ABNORMAL
PMV BLD AUTO: 9.5 FL (ref 8.1–13.5)
POTASSIUM SERPL-SCNC: 4.5 MMOL/L (ref 3.7–5.3)
RBC # BLD: 5.32 M/UL (ref 3.95–5.11)
RBC # BLD: ABNORMAL 10*6/UL
SEG NEUTROPHILS: 63 % (ref 36–65)
SEGMENTED NEUTROPHILS ABSOLUTE COUNT: 5.15 K/UL (ref 1.5–8.1)
SODIUM BLD-SCNC: 140 MMOL/L (ref 135–144)
TOTAL PROTEIN: 7.3 G/DL (ref 6.4–8.3)
TRIGL SERPL-MCNC: 257 MG/DL
VLDLC SERPL CALC-MCNC: ABNORMAL MG/DL (ref 1–30)
WBC # BLD: 8.2 K/UL (ref 3.5–11.3)
WBC # BLD: ABNORMAL 10*3/UL

## 2020-01-20 PROCEDURE — 83036 HEMOGLOBIN GLYCOSYLATED A1C: CPT

## 2020-01-20 PROCEDURE — 80053 COMPREHEN METABOLIC PANEL: CPT

## 2020-01-20 PROCEDURE — 85025 COMPLETE CBC W/AUTO DIFF WBC: CPT

## 2020-01-20 PROCEDURE — 80061 LIPID PANEL: CPT

## 2020-01-20 PROCEDURE — 36415 COLL VENOUS BLD VENIPUNCTURE: CPT

## 2020-01-21 ENCOUNTER — OFFICE VISIT (OUTPATIENT)
Dept: FAMILY MEDICINE CLINIC | Age: 65
End: 2020-01-21
Payer: COMMERCIAL

## 2020-01-21 VITALS
BODY MASS INDEX: 31.08 KG/M2 | SYSTOLIC BLOOD PRESSURE: 126 MMHG | HEIGHT: 67 IN | DIASTOLIC BLOOD PRESSURE: 72 MMHG | WEIGHT: 198 LBS | HEART RATE: 68 BPM

## 2020-01-21 PROCEDURE — 99214 OFFICE O/P EST MOD 30 MIN: CPT | Performed by: FAMILY MEDICINE

## 2020-01-21 ASSESSMENT — ENCOUNTER SYMPTOMS
DIARRHEA: 1
NAUSEA: 0
RESPIRATORY NEGATIVE: 1
CONSTIPATION: 0
BACK PAIN: 1
EYES NEGATIVE: 1
ALLERGIC/IMMUNOLOGIC NEGATIVE: 1

## 2020-01-21 ASSESSMENT — PATIENT HEALTH QUESTIONNAIRE - PHQ9
2. FEELING DOWN, DEPRESSED OR HOPELESS: 0
1. LITTLE INTEREST OR PLEASURE IN DOING THINGS: 0
SUM OF ALL RESPONSES TO PHQ QUESTIONS 1-9: 0
SUM OF ALL RESPONSES TO PHQ QUESTIONS 1-9: 0
SUM OF ALL RESPONSES TO PHQ9 QUESTIONS 1 & 2: 0

## 2020-01-21 NOTE — PROGRESS NOTES
 COLONOSCOPY  05/03/2017    [id number]    HYSTERECTOMY, TOTAL ABDOMINAL  2011    with bladder lift    MASTECTOMY Bilateral 12/11/2007    lymphnodes were also removed    NECK SURGERY  06/30/2010    cervical 6-7 microdiscectomy. Dr. Kathia Jacobson Right 06/10/2013    limited to kidney    [place] OFFICE/OUTPT VISIT,PROCEDURE ONLY N/A 5/11/2018    ANTERIOR C4-5, C5-6 ARTHROPLASTY WITH , SUPINE POSITION, MICROSCOPE, C-ARM, MEDTRONIC(REP NOTIFIED), EVOKES; (ONE PLATE AND FOUR SCREWS REMOVED C6-C7 AND DISCARDED) performed by Calvin Chang DO at [facility] 176 Left 05/30/2019    TUBAL LIGATION Bilateral 1988    UPPER GASTROINTESTINAL ENDOSCOPY  01/12/10    normal.  Dr. Wesley Hurtado EXTRACTION       Current Outpatient Medications   Medication Sig Dispense Refill    glimepiride (AMARYL) 2 MG tablet Take 1 tablet by mouth twice daily with meals 180 tablet 3    metFORMIN (GLUCOPHAGE XR) 750 MG extended release tablet Take 1 tablet by mouth every 12 hours 180 tablet 3    metoprolol tartrate (LOPRESSOR) 25 MG tablet TAKE 1 TABLET TWICE A  tablet 3    lisinopril (PRINIVIL;ZESTRIL) 20 MG tablet TAKE 1 TABLET DAILY 90 tablet 3    simvastatin (ZOCOR) 40 MG tablet TAKE 1 TABLET NIGHTLY 90 tablet 3    omeprazole (PRILOSEC) 20 MG delayed release capsule TAKE 1 CAPSULE DAILY 90 capsule 2    nitroGLYCERIN (NITROSTAT) 0.4 MG SL tablet Place 1 tablet under the tongue every 5 minutes as needed for Chest pain 25 tablet 0     No current facility-administered medications for this visit. No Known Allergies      Review of Systems   Constitutional: Negative. Negative for fatigue and unexpected weight change. HENT: Negative. Eyes: Negative. Respiratory: Negative. Cardiovascular: Negative. Negative for chest pain. Gastrointestinal: Positive for diarrhea (intermittent, metformin related). Negative for constipation and nausea. Endocrine: Negative. 01/20/2020 11.0* 4.8 - 5.9 % Final    Estimated Avg Glucose 01/20/2020 269  mg/dL Final    Cholesterol 01/20/2020 222* <200 mg/dL Final    HDL 01/20/2020 45  >40 mg/dL Final    LDL Cholesterol 01/20/2020 126  0 - 130 mg/dL Final    Chol/HDL Ratio 01/20/2020 4.9  <5 Final    Triglycerides 01/20/2020 257* <150 mg/dL Final    VLDL 01/20/2020 NOT REPORTED* 1 - 30 mg/dL Final    WBC 01/20/2020 8.2  3.5 - 11.3 k/uL Final    RBC 01/20/2020 5.32* 3.95 - 5.11 m/uL Final    Hemoglobin 01/20/2020 14.3  11.9 - 15.1 g/dL Final    Hematocrit 01/20/2020 42.7  36.3 - 47.1 % Final    MCV 01/20/2020 80.3* 82.6 - 102.9 fL Final    MCH 01/20/2020 26.9  25.2 - 33.5 pg Final    MCHC 01/20/2020 33.5  25.2 - 33.5 g/dL Final    RDW 01/20/2020 13.3  11.8 - 14.4 % Final    Platelets 95/30/5216 282  138 - 453 k/uL Final    MPV 01/20/2020 9.5  8.1 - 13.5 fL Final    NRBC Automated 01/20/2020 0.0  0.0 per 100 WBC Final    Differential Type 01/20/2020 NOT REPORTED   Final    WBC Morphology 01/20/2020 NOT REPORTED   Final    RBC Morphology 01/20/2020 MICROCYTOSIS PRESENT   Final    Platelet Estimate 26/21/1578 NOT REPORTED   Final    Seg Neutrophils 01/20/2020 63  36 - 65 % Final    Lymphocytes 01/20/2020 29  24 - 43 % Final    Monocytes 01/20/2020 6  3 - 12 % Final    Eosinophils % 01/20/2020 1  1 - 4 % Final    Basophils 01/20/2020 0  0 - 2 % Final    Immature Granulocytes 01/20/2020 1* 0 % Final    Segs Absolute 01/20/2020 5.15  1.50 - 8.10 k/uL Final    Absolute Lymph # 01/20/2020 2.38  1.10 - 3.70 k/uL Final    Absolute Mono # 01/20/2020 0.48  0.10 - 1.20 k/uL Final    Absolute Eos # 01/20/2020 0.10  0.00 - 0.44 k/uL Final    Basophils Absolute 01/20/2020 0.03  0.00 - 0.20 k/uL Final    Absolute Immature Granulocyte 01/20/2020 0.07  0.00 - 0.30 k/uL Final    Glucose 01/20/2020 195* 70 - 99 mg/dL Final    BUN 01/20/2020 14  8 - 23 mg/dL Final    CREATININE 01/20/2020 0.60  0.50 - 0.90 mg/dL Final    metformin. She was also off the amaryl for a month also. Given non compliance, plan to restart amaryl 2mg bid and metformin 750mg bid with meals and recheck in 3 months. Htn: increased lisinopril to 20mg/day last Feb. , improved control at present. Will cont current dosing and recheck serially. Hyperlipidemia: started simvastatin 40mg after last hospitalization. Remains improved. Cont. Current dosing and recheck at follow up. Wt. Down 2 lbs. Discussed restricted carb. Diet with need for wt. Loss to help with diabetic control. Doing better at present. Cont. Same. Chronic cervicogenic pain on the left side radiating to the left arm, s/p prior fusion and C6-7. Mri with mild spinal stenosis at C5-6 and bilateral foraminal narrowing. Failed PT and conservative treatments. Now s/p  ANTERIOR C4-5, C5-6 ARTHROPLASTY, (ONE PLATE AND FOUR SCREWS REMOVED C6-C7 AND DISCARDED 5/11/18. Doing well post operatively. Pain improved. Remains quiescent. Osteopenia: updating dexa. Still pending. Renal cell carcinoma s/p right partial nephrectomy 2013. Confined to kidney. Negative PET scan. Dr. Army Flores following. Follow up coming up this week/annually. Gerd: quiescent on prilosec. Using prn at present. Chronic left shoulder pain. S/p reverse total shoulder replacement 5/30/19. Went well. completed PT. Improved pain. Still working on rom and strength. Considering right shoulder replacement in the future, but not planning it this year.       Kidney stone history of: quiescent over the interval.

## 2020-01-21 NOTE — PATIENT INSTRUCTIONS
Hospital Outpatient Visit on 01/20/2020   Component Date Value Ref Range Status    Hemoglobin A1C 01/20/2020 11.0* 4.8 - 5.9 % Final    Estimated Avg Glucose 01/20/2020 269  mg/dL Final    Cholesterol 01/20/2020 222* <200 mg/dL Final    Comment:    Cholesterol Guidelines:      <200  Desirable   200-240  Borderline      >240  Undesirable         HDL 01/20/2020 45  >40 mg/dL Final    Comment:    HDL Guidelines:    <40     Undesirable   40-59    Borderline    >59     Desirable         LDL Cholesterol 01/20/2020 126  0 - 130 mg/dL Final    Comment:    LDL Guidelines:     <100    Desirable   100-129   Near to/above Desirable   130-159   Borderline      >159   Undesirable     Direct (measured) LDL and calculated LDL are not interchangeable tests.  Chol/HDL Ratio 01/20/2020 4.9  <5 Final            Triglycerides 01/20/2020 257* <150 mg/dL Final    Comment:    Triglyceride Guidelines:     <150   Desirable   150-199  Borderline   200-499  High     >499   Very high   Based on AHA Guidelines for fasting triglyceride, October 2012.          VLDL 01/20/2020 NOT REPORTED* 1 - 30 mg/dL Final    WBC 01/20/2020 8.2  3.5 - 11.3 k/uL Final    RBC 01/20/2020 5.32* 3.95 - 5.11 m/uL Final    Hemoglobin 01/20/2020 14.3  11.9 - 15.1 g/dL Final    Hematocrit 01/20/2020 42.7  36.3 - 47.1 % Final    MCV 01/20/2020 80.3* 82.6 - 102.9 fL Final    MCH 01/20/2020 26.9  25.2 - 33.5 pg Final    MCHC 01/20/2020 33.5  25.2 - 33.5 g/dL Final    RDW 01/20/2020 13.3  11.8 - 14.4 % Final    Platelets 74/58/5832 282  138 - 453 k/uL Final    MPV 01/20/2020 9.5  8.1 - 13.5 fL Final    NRBC Automated 01/20/2020 0.0  0.0 per 100 WBC Final    Differential Type 01/20/2020 NOT REPORTED   Final    WBC Morphology 01/20/2020 NOT REPORTED   Final    RBC Morphology 01/20/2020 MICROCYTOSIS PRESENT   Final    Platelet Estimate 43/31/7958 NOT REPORTED   Final    Seg Neutrophils 01/20/2020 63  36 - 65 % Final    Lymphocytes 01/20/2020 29  24 - 43 % Final    Monocytes 01/20/2020 6  3 - 12 % Final    Eosinophils % 01/20/2020 1  1 - 4 % Final    Basophils 01/20/2020 0  0 - 2 % Final    Immature Granulocytes 01/20/2020 1* 0 % Final    Segs Absolute 01/20/2020 5.15  1.50 - 8.10 k/uL Final    Absolute Lymph # 01/20/2020 2.38  1.10 - 3.70 k/uL Final    Absolute Mono # 01/20/2020 0.48  0.10 - 1.20 k/uL Final    Absolute Eos # 01/20/2020 0.10  0.00 - 0.44 k/uL Final    Basophils Absolute 01/20/2020 0.03  0.00 - 0.20 k/uL Final    Absolute Immature Granulocyte 01/20/2020 0.07  0.00 - 0.30 k/uL Final    Glucose 01/20/2020 195* 70 - 99 mg/dL Final    BUN 01/20/2020 14  8 - 23 mg/dL Final    CREATININE 01/20/2020 0.60  0.50 - 0.90 mg/dL Final    Bun/Cre Ratio 01/20/2020 23* 9 - 20 Final    Calcium 01/20/2020 9.8  8.6 - 10.4 mg/dL Final    Sodium 01/20/2020 140  135 - 144 mmol/L Final    Potassium 01/20/2020 4.5  3.7 - 5.3 mmol/L Final    Chloride 01/20/2020 100  98 - 107 mmol/L Final    CO2 01/20/2020 26  20 - 31 mmol/L Final    Anion Gap 01/20/2020 14  9 - 17 mmol/L Final    Alkaline Phosphatase 01/20/2020 134* 35 - 104 U/L Final    ALT 01/20/2020 27  5 - 33 U/L Final    AST 01/20/2020 20  <32 U/L Final    Total Bilirubin 01/20/2020 0.35  0.3 - 1.2 mg/dL Final    Total Protein 01/20/2020 7.3  6.4 - 8.3 g/dL Final    Alb 01/20/2020 4.4  3.5 - 5.2 g/dL Final    Albumin/Globulin Ratio 01/20/2020 1.5  1.0 - 2.5 Final    GFR Non- 01/20/2020 >60  >60 mL/min Final    GFR  01/20/2020 >60  >60 mL/min Final    GFR Comment 01/20/2020        Final    Comment: Average GFR for 61-76 years old:   80 mL/min/1.73sq m  Chronic Kidney Disease:   <60 mL/min/1.73sq m  Kidney failure:   <15 mL/min/1.73sq m              eGFR calculated using average adult body mass.  Additional eGFR calculator available at:        emids.br            GFR Staging 01/20/2020 NOT REPORTED   Final

## 2020-01-28 ENCOUNTER — TELEPHONE (OUTPATIENT)
Dept: FAMILY MEDICINE CLINIC | Age: 65
End: 2020-01-28

## 2020-01-28 NOTE — TELEPHONE ENCOUNTER
Pt calling stating she was seen and Dexa was ordered but it was coded as routine and pt's ins won't cover routing, there needs to be a reason, pt states she's had one in the past and her ins covered it, please advise at above number.

## 2020-05-19 ENCOUNTER — HOSPITAL ENCOUNTER (OUTPATIENT)
Dept: LAB | Age: 65
Discharge: HOME | End: 2020-05-19
Payer: COMMERCIAL

## 2020-05-19 LAB
ANION GAP SERPL CALCULATED.3IONS-SCNC: 16 MMOL/L (ref 9–17)
BUN BLDV-MCNC: 12 MG/DL (ref 8–23)
BUN/CREAT BLD: 20 (ref 9–20)
CALCIUM SERPL-MCNC: 10.1 MG/DL (ref 8.6–10.4)
CHLORIDE BLD-SCNC: 104 MMOL/L (ref 98–107)
CO2: 25 MMOL/L (ref 20–31)
CREAT SERPL-MCNC: 0.6 MG/DL (ref 0.5–0.9)
ESTIMATED AVERAGE GLUCOSE: 151 MG/DL
GFR AFRICAN AMERICAN: >60 ML/MIN
GFR NON-AFRICAN AMERICAN: >60 ML/MIN
GFR SERPL CREATININE-BSD FRML MDRD: ABNORMAL ML/MIN/{1.73_M2}
GFR SERPL CREATININE-BSD FRML MDRD: ABNORMAL ML/MIN/{1.73_M2}
GLUCOSE BLD-MCNC: 131 MG/DL (ref 70–99)
HBA1C MFR BLD: 6.9 % (ref 4.8–5.9)
POTASSIUM SERPL-SCNC: 4.6 MMOL/L (ref 3.7–5.3)
SODIUM BLD-SCNC: 145 MMOL/L (ref 135–144)

## 2020-05-19 PROCEDURE — 83036 HEMOGLOBIN GLYCOSYLATED A1C: CPT

## 2020-05-19 PROCEDURE — 36415 COLL VENOUS BLD VENIPUNCTURE: CPT

## 2020-05-19 PROCEDURE — 80048 BASIC METABOLIC PNL TOTAL CA: CPT

## 2020-06-01 ENCOUNTER — VIRTUAL VISIT (OUTPATIENT)
Dept: FAMILY MEDICINE CLINIC | Age: 65
End: 2020-06-01
Payer: COMMERCIAL

## 2020-06-01 VITALS — WEIGHT: 193 LBS | HEIGHT: 67 IN | BODY MASS INDEX: 30.29 KG/M2

## 2020-06-01 PROCEDURE — 99213 OFFICE O/P EST LOW 20 MIN: CPT | Performed by: FAMILY MEDICINE

## 2020-06-01 RX ORDER — OMEPRAZOLE 20 MG/1
CAPSULE, DELAYED RELEASE ORAL
Qty: 90 CAPSULE | Refills: 1 | Status: SHIPPED | OUTPATIENT
Start: 2020-06-01 | End: 2020-12-30 | Stop reason: SDUPTHER

## 2020-06-01 RX ORDER — METFORMIN HYDROCHLORIDE 750 MG/1
1000 TABLET, EXTENDED RELEASE ORAL EVERY 12 HOURS
Qty: 180 TABLET | Refills: 3 | Status: SHIPPED | OUTPATIENT
Start: 2020-06-01 | End: 2020-12-30 | Stop reason: SDUPTHER

## 2020-06-01 RX ORDER — GLIMEPIRIDE 2 MG/1
TABLET ORAL
Qty: 180 TABLET | Refills: 3 | Status: SHIPPED | OUTPATIENT
Start: 2020-06-01 | End: 2020-12-30 | Stop reason: SDUPTHER

## 2020-06-01 RX ORDER — LISINOPRIL 20 MG/1
TABLET ORAL
Qty: 90 TABLET | Refills: 3 | Status: SHIPPED | OUTPATIENT
Start: 2020-06-01 | End: 2020-12-30 | Stop reason: SDUPTHER

## 2020-06-01 RX ORDER — SIMVASTATIN 40 MG
TABLET ORAL
Qty: 90 TABLET | Refills: 3 | Status: SHIPPED | OUTPATIENT
Start: 2020-06-01 | End: 2020-12-30 | Stop reason: SDUPTHER

## 2020-06-01 RX ORDER — NITROGLYCERIN 0.4 MG/1
0.4 TABLET SUBLINGUAL EVERY 5 MIN PRN
Qty: 25 TABLET | Refills: 0 | Status: SHIPPED | OUTPATIENT
Start: 2020-06-01 | End: 2022-06-06 | Stop reason: SDUPTHER

## 2020-06-01 ASSESSMENT — PATIENT HEALTH QUESTIONNAIRE - PHQ9
SUM OF ALL RESPONSES TO PHQ QUESTIONS 1-9: 0
SUM OF ALL RESPONSES TO PHQ9 QUESTIONS 1 & 2: 0
2. FEELING DOWN, DEPRESSED OR HOPELESS: 0
1. LITTLE INTEREST OR PLEASURE IN DOING THINGS: 0
SUM OF ALL RESPONSES TO PHQ QUESTIONS 1-9: 0

## 2020-06-01 ASSESSMENT — ENCOUNTER SYMPTOMS
DIARRHEA: 1
BACK PAIN: 1
RESPIRATORY NEGATIVE: 1
EYES NEGATIVE: 1
CONSTIPATION: 0
NAUSEA: 0
ALLERGIC/IMMUNOLOGIC NEGATIVE: 1

## 2020-06-01 NOTE — PROGRESS NOTES
2020     Solomon De Los Santos (:  1955) is a 59 y.o. female, here for evaluation of the following medical concerns:    [facility] [place]   Scheduled 3 month follow up on diabetes. Video teleconference visit scheduled today during covid 19 . Patient consented to use personal cell phone to connect the visit at home, alone. I am in the office using [employer] software to connect. I have reviewed the patient's medical history in detail and updated the computerized patient record. She had elevations in bs over 200 at last visit. Poor control with a1c at 11%. She had a month or so of noncompliance at that time. We restarted the amaryl and metformin over the last 3 months. Blood sugars improved over the interval.  Consistently less then 200. Past Medical History:   Diagnosis Date    Cancer [facility])     Right Breast cancer    Cervical radiculopathy     DDD (degenerative disc disease)     cervical    GERD (gastroesophageal reflux disease)     Hyperlipidemia     Hypertension     Impaired fasting blood sugar     Kidney stone     Myopia with astigmatism and presbyopia     Obesity     Osteopenia     Renal cell carcinoma ([facility] [address] 06/10/13    right kidney: clear cell type. Corine grade 2 of 4. 2.5cm limited to kidney    Stenosis of cervical spine with myelopathy (HCC)     Stress incontinence     Wears glasses      Past Surgical History:   Procedure Laterality Date    BLADDER SUSPENSION      incontinence    BREAST BIOPSY Right 2007    wire localization     CERVICAL SPINE SURGERY  2018     ANTERIOR C4-5, C5-6 ARTHROPLASTY, (ONE PLATE AND FOUR SCREWS REMOVED C6-C7 AND DISCARDED    COLONOSCOPY  2008    COLONOSCOPY  2017    [id number]    HYSTERECTOMY, TOTAL ABDOMINAL      with bladder lift    MASTECTOMY Bilateral 2007    lymphnodes were also removed    NECK SURGERY  2010    cervical 6-7 microdiscectomy.  Dr. Renu Thurman NEPHRECTOMY Right 06/10/2013    limited to kidney    MD OFFICE/OUTPT VISIT,PROCEDURE ONLY N/A 5/11/2018    ANTERIOR C4-5, C5-6 ARTHROPLASTY WITH , SUPINE POSITION, MICROSCOPE, C-ARM, MEDTRONIC(REP NOTIFIED), EVOKES; (ONE PLATE AND FOUR SCREWS REMOVED C6-C7 AND DISCARDED) performed by Tano Martinez DO at [facility] 176 Left 05/30/2019    TUBAL LIGATION Bilateral 1988    UPPER GASTROINTESTINAL ENDOSCOPY  01/12/10    normal.  Dr. Vianney Haque     \      Review of Systems   Constitutional: Negative. Negative for fatigue and unexpected weight change. HENT: Negative. Eyes: Negative. Respiratory: Negative. Cardiovascular: Negative. Negative for chest pain. Gastrointestinal: Positive for diarrhea (intermittent, metformin related). Negative for constipation and nausea. Endocrine: Negative. Genitourinary: Negative. Negative for flank pain. Musculoskeletal: Positive for arthralgias (right shoulder), back pain (left lower back) and neck pain (improved after surgery). Negative for neck stiffness. Skin: Negative. Allergic/Immunologic: Negative. Neurological: Negative. Hematological: Negative. Psychiatric/Behavioral: Negative. Prior to Visit Medications    Medication Sig Taking?  Authorizing Provider   glimepiride (AMARYL) 2 MG tablet Take 1 tablet by mouth twice daily with meals  Agusto Harden MD   metFORMIN (GLUCOPHAGE XR) 750 MG extended release tablet Take 1 tablet by mouth every 12 hours  Agusto Harden MD   metoprolol tartrate (LOPRESSOR) 25 MG tablet TAKE 1 TABLET TWICE A DAY  Agusto Harden MD   lisinopril (PRINIVIL;ZESTRIL) 20 MG tablet TAKE 1 TABLET DAILY  Agusto Harden MD   simvastatin (ZOCOR) 40 MG tablet TAKE 1 TABLET NIGHTLY  Agusto Harden MD   omeprazole (PRILOSEC) 20 MG delayed release capsule TAKE 1 CAPSULE DAILY  Agusto Harden MD   nitroGLYCERIN (NITROSTAT) 0.4 MG SL tablet Place 1 tablet under the tongue every 5 minutes

## 2020-07-12 ENCOUNTER — OFFICE VISIT (OUTPATIENT)
Dept: PRIMARY CARE CLINIC | Age: 65
End: 2020-07-12
Payer: COMMERCIAL

## 2020-07-12 VITALS
HEART RATE: 84 BPM | DIASTOLIC BLOOD PRESSURE: 80 MMHG | WEIGHT: 195 LBS | SYSTOLIC BLOOD PRESSURE: 126 MMHG | OXYGEN SATURATION: 98 % | TEMPERATURE: 98.2 F | BODY MASS INDEX: 30.53 KG/M2

## 2020-07-12 PROCEDURE — 99212 OFFICE O/P EST SF 10 MIN: CPT

## 2020-07-12 PROCEDURE — 99213 OFFICE O/P EST LOW 20 MIN: CPT | Performed by: NURSE PRACTITIONER

## 2020-07-12 RX ORDER — AMOXICILLIN 875 MG/1
875 TABLET, COATED ORAL 2 TIMES DAILY
Qty: 20 TABLET | Refills: 0 | Status: SHIPPED | OUTPATIENT
Start: 2020-07-12 | End: 2020-07-22

## 2020-07-12 ASSESSMENT — PATIENT HEALTH QUESTIONNAIRE - PHQ9
SUM OF ALL RESPONSES TO PHQ QUESTIONS 1-9: 0
SUM OF ALL RESPONSES TO PHQ9 QUESTIONS 1 & 2: 0
1. LITTLE INTEREST OR PLEASURE IN DOING THINGS: 0
2. FEELING DOWN, DEPRESSED OR HOPELESS: 0
SUM OF ALL RESPONSES TO PHQ QUESTIONS 1-9: 0

## 2020-07-12 ASSESSMENT — ENCOUNTER SYMPTOMS
RHINORRHEA: 0
COUGH: 0
WHEEZING: 0
SORE THROAT: 0
SHORTNESS OF BREATH: 0

## 2020-07-12 NOTE — PROGRESS NOTES
Diagnosis Orders   1. Left otitis media, unspecified otitis media type             Plan: Will treat with amoxicillin 875mg 1 tablet BID for 10 days  Start antihistamine.   If no improvement will consider referral to PT for possible eply maneuver as vertigo may be the cause of the dizziness vs infection  Pt to return PRN         Veronica Li, APRN - CNP

## 2020-11-10 ENCOUNTER — HOSPITAL ENCOUNTER (OUTPATIENT)
Dept: BONE DENSITY | Age: 65
Discharge: HOME | End: 2020-11-12
Payer: MEDICARE

## 2020-11-10 PROCEDURE — 77085 DXA BONE DENSITY AXL VRT FX: CPT

## 2020-11-17 RX ORDER — IBANDRONATE SODIUM 150 MG/1
150 TABLET, FILM COATED ORAL
Qty: 12 TABLET | Refills: 3 | Status: SHIPPED | OUTPATIENT
Start: 2020-11-17 | End: 2021-08-06

## 2020-12-30 ENCOUNTER — OFFICE VISIT (OUTPATIENT)
Dept: FAMILY MEDICINE CLINIC | Age: 65
End: 2020-12-30
Payer: MEDICARE

## 2020-12-30 VITALS
WEIGHT: 203 LBS | BODY MASS INDEX: 31.86 KG/M2 | DIASTOLIC BLOOD PRESSURE: 80 MMHG | SYSTOLIC BLOOD PRESSURE: 126 MMHG | OXYGEN SATURATION: 98 % | TEMPERATURE: 98.1 F | HEART RATE: 74 BPM | HEIGHT: 67 IN

## 2020-12-30 PROCEDURE — 99213 OFFICE O/P EST LOW 20 MIN: CPT

## 2020-12-30 PROCEDURE — 99214 OFFICE O/P EST MOD 30 MIN: CPT | Performed by: FAMILY MEDICINE

## 2020-12-30 RX ORDER — SIMVASTATIN 40 MG
TABLET ORAL
Qty: 90 TABLET | Refills: 3 | Status: ON HOLD | OUTPATIENT
Start: 2020-12-30 | End: 2021-12-06 | Stop reason: HOSPADM

## 2020-12-30 RX ORDER — ASPIRIN 81 MG/1
TABLET ORAL
COMMUNITY
End: 2021-08-06

## 2020-12-30 RX ORDER — GLIMEPIRIDE 2 MG/1
TABLET ORAL
Qty: 180 TABLET | Refills: 1 | Status: SHIPPED | OUTPATIENT
Start: 2020-12-30 | End: 2021-04-22 | Stop reason: SDUPTHER

## 2020-12-30 RX ORDER — OMEPRAZOLE 20 MG/1
CAPSULE, DELAYED RELEASE ORAL
Qty: 90 CAPSULE | Refills: 1 | Status: SHIPPED | OUTPATIENT
Start: 2020-12-30 | End: 2021-04-22 | Stop reason: SDUPTHER

## 2020-12-30 RX ORDER — METFORMIN HYDROCHLORIDE 750 MG/1
1000 TABLET, EXTENDED RELEASE ORAL EVERY 12 HOURS
Qty: 180 TABLET | Refills: 1 | Status: SHIPPED | OUTPATIENT
Start: 2020-12-30 | End: 2021-04-22 | Stop reason: SDUPTHER

## 2020-12-30 RX ORDER — LISINOPRIL 20 MG/1
TABLET ORAL
Qty: 90 TABLET | Refills: 1 | Status: SHIPPED | OUTPATIENT
Start: 2020-12-30 | End: 2021-04-22 | Stop reason: SDUPTHER

## 2020-12-30 ASSESSMENT — ENCOUNTER SYMPTOMS
EYES NEGATIVE: 1
ALLERGIC/IMMUNOLOGIC NEGATIVE: 1
NAUSEA: 0
RESPIRATORY NEGATIVE: 1
BACK PAIN: 1
DIARRHEA: 1
CONSTIPATION: 0

## 2020-12-30 NOTE — PROGRESS NOTES
tablet by mouth twice daily with meals Yes Lynn Serrano MD   metFORMIN (GLUCOPHAGE XR) 750 MG extended release tablet Take 1 tablet by mouth every 12 hours Yes Lynn Serrano MD   metoprolol tartrate (LOPRESSOR) 25 MG tablet TAKE 1 TABLET TWICE A DAY Yes Lynn Serrano MD   lisinopril (PRINIVIL;ZESTRIL) 20 MG tablet TAKE 1 TABLET DAILY Yes Lynn Serrano MD   simvastatin (ZOCOR) 40 MG tablet TAKE 1 TABLET NIGHTLY Yes Lynn Serrano MD   omeprazole (PRILOSEC) 20 MG delayed release capsule TAKE 1 CAPSULE DAILY  Patient taking differently: Indications: taking PRN TAKE 1 CAPSULE DAILY Yes Lynn Serrano MD   nitroGLYCERIN (NITROSTAT) 0.4 MG SL tablet Place 1 tablet under the tongue every 5 minutes as needed for Chest pain Yes Lynn Serrano MD        Social History     Tobacco Use    Smoking status: Never Smoker    Smokeless tobacco: Never Used    Tobacco comment: [email or web address] 7/29/15   Substance Use Topics    Alcohol use: Yes     Alcohol/week: 0.0 standard drinks     Comment: rarely        Vitals:    12/30/20 1505   BP: 126/80   Site: Left Upper Arm   Position: Sitting   Cuff Size: Large Adult   Pulse: 74   Temp: 98.1 °F (36.7 °C)   TempSrc: Tympanic   SpO2: 98%   Weight: 203 lb (92.1 kg)   Height: 5' 7\" (1.702 m)     Estimated body mass index is 31.79 kg/m² as calculated from the following:    Height as of this encounter: 5' 7\" (1.702 m). Weight as of this encounter: 203 lb (92.1 kg). Physical Exam  Constitutional:       General: She is not in acute distress. Appearance: She is normal weight. She is not toxic-appearing. HENT:      Head: Normocephalic and atraumatic. Nose: Nose normal.   Eyes:      Extraocular Movements: Extraocular movements intact. Neck:      Musculoskeletal: Normal range of motion. Pulmonary:      Effort: Pulmonary effort is normal. No respiratory distress. Skin:     Findings: No rash. Neurological:      Mental Status: She is alert.    Psychiatric:         Mood and Affect: Mood normal.         Behavior: Behavior normal.         Thought Content: Thought content normal.         Judgment: Judgment normal.     /80 (Site: Left Upper Arm, Position: Sitting, Cuff Size: Large Adult)   Pulse 74   Temp 98.1 °F (36.7 °C) (Tympanic)   Ht 5' 7\" (1.702 m)   Wt 203 lb (92.1 kg)   LMP  (LMP Unknown)   SpO2 98%   BMI 31.79 kg/m²     Labs from 12/18/20 through her insurance company reviewed. Plan to copy to media section. ASSESSMENT/PLAN:  Encounter Diagnosis   Name Primary?  Type 2 diabetes mellitus without complication, without long-term current use of insulin ([facility] [address] Yes     Compliance issues again. She stopped both the amaryl and metformin due to diarrhea and gas issues. Labs from insurance company with a1c at 9% 12/18/20. Plan to cont. Amaryl, with option of increasing to bid dosing. She can stop the metformin if it is too much to handle as far as diarrhea. Htn: good control at present. Cont current lopressor and lisinopril. Hyperlipidemia; recent labs reviewed good control on lipitor,. An electronic signature was used to authenticate this note.     --Nikhil Aguirre MD on 12/30/2020 at 3:59 PM

## 2021-04-09 ENCOUNTER — HOSPITAL ENCOUNTER (OUTPATIENT)
Dept: LAB | Age: 66
Discharge: HOME | End: 2021-04-09
Payer: MEDICARE

## 2021-04-09 DIAGNOSIS — E11.8 TYPE 2 DIABETES MELLITUS WITH COMPLICATION, WITHOUT LONG-TERM CURRENT USE OF INSULIN (HCC): ICD-10-CM

## 2021-04-09 LAB
ABSOLUTE EOS #: 0.16 K/UL (ref 0–0.44)
ABSOLUTE IMMATURE GRANULOCYTE: 0.06 K/UL (ref 0–0.3)
ABSOLUTE LYMPH #: 2.89 K/UL (ref 1.1–3.7)
ABSOLUTE MONO #: 0.45 K/UL (ref 0.1–1.2)
ALBUMIN SERPL-MCNC: 4.3 G/DL (ref 3.5–5.2)
ALBUMIN/GLOBULIN RATIO: 1.4 (ref 1–2.5)
ALP BLD-CCNC: 125 U/L (ref 35–104)
ALT SERPL-CCNC: 17 U/L (ref 5–33)
ANION GAP SERPL CALCULATED.3IONS-SCNC: 15 MMOL/L (ref 9–17)
AST SERPL-CCNC: 14 U/L
BASOPHILS # BLD: 1 % (ref 0–2)
BASOPHILS ABSOLUTE: 0.05 K/UL (ref 0–0.2)
BILIRUB SERPL-MCNC: 0.36 MG/DL (ref 0.3–1.2)
BUN BLDV-MCNC: 16 MG/DL (ref 8–23)
BUN/CREAT BLD: 22 (ref 9–20)
CALCIUM SERPL-MCNC: 9.6 MG/DL (ref 8.6–10.4)
CHLORIDE BLD-SCNC: 105 MMOL/L (ref 98–107)
CHOLESTEROL/HDL RATIO: 4.1
CHOLESTEROL: 195 MG/DL
CO2: 23 MMOL/L (ref 20–31)
CREAT SERPL-MCNC: 0.74 MG/DL (ref 0.5–0.9)
DIFFERENTIAL TYPE: ABNORMAL
EOSINOPHILS RELATIVE PERCENT: 2 % (ref 1–4)
ESTIMATED AVERAGE GLUCOSE: 177 MG/DL
GFR AFRICAN AMERICAN: >60 ML/MIN
GFR NON-AFRICAN AMERICAN: >60 ML/MIN
GFR SERPL CREATININE-BSD FRML MDRD: ABNORMAL ML/MIN/{1.73_M2}
GFR SERPL CREATININE-BSD FRML MDRD: ABNORMAL ML/MIN/{1.73_M2}
GLUCOSE BLD-MCNC: 190 MG/DL (ref 70–99)
HBA1C MFR BLD: 7.8 % (ref 4–6)
HCT VFR BLD CALC: 42.5 % (ref 36.3–47.1)
HDLC SERPL-MCNC: 47 MG/DL
HEMOGLOBIN: 14.1 G/DL (ref 11.9–15.1)
IMMATURE GRANULOCYTES: 1 %
LDL CHOLESTEROL: 114 MG/DL (ref 0–130)
LYMPHOCYTES # BLD: 35 % (ref 24–43)
MCH RBC QN AUTO: 27.5 PG (ref 25.2–33.5)
MCHC RBC AUTO-ENTMCNC: 33.2 G/DL (ref 25.2–33.5)
MCV RBC AUTO: 83 FL (ref 82.6–102.9)
MONOCYTES # BLD: 6 % (ref 3–12)
NRBC AUTOMATED: 0 PER 100 WBC
PDW BLD-RTO: 13.2 % (ref 11.8–14.4)
PLATELET # BLD: 277 K/UL (ref 138–453)
PLATELET ESTIMATE: ABNORMAL
PMV BLD AUTO: 9.6 FL (ref 8.1–13.5)
POTASSIUM SERPL-SCNC: 3.7 MMOL/L (ref 3.7–5.3)
RBC # BLD: 5.12 M/UL (ref 3.95–5.11)
RBC # BLD: ABNORMAL 10*6/UL
SEG NEUTROPHILS: 55 % (ref 36–65)
SEGMENTED NEUTROPHILS ABSOLUTE COUNT: 4.64 K/UL (ref 1.5–8.1)
SODIUM BLD-SCNC: 143 MMOL/L (ref 135–144)
TOTAL PROTEIN: 7.3 G/DL (ref 6.4–8.3)
TRIGL SERPL-MCNC: 170 MG/DL
VLDLC SERPL CALC-MCNC: ABNORMAL MG/DL (ref 1–30)
WBC # BLD: 8.3 K/UL (ref 3.5–11.3)
WBC # BLD: ABNORMAL 10*3/UL

## 2021-04-09 PROCEDURE — 83036 HEMOGLOBIN GLYCOSYLATED A1C: CPT

## 2021-04-09 PROCEDURE — 80053 COMPREHEN METABOLIC PANEL: CPT

## 2021-04-09 PROCEDURE — 85025 COMPLETE CBC W/AUTO DIFF WBC: CPT

## 2021-04-09 PROCEDURE — 80061 LIPID PANEL: CPT

## 2021-04-22 ENCOUNTER — OFFICE VISIT (OUTPATIENT)
Dept: FAMILY MEDICINE CLINIC | Age: 66
End: 2021-04-22
Payer: MEDICARE

## 2021-04-22 VITALS
HEART RATE: 72 BPM | DIASTOLIC BLOOD PRESSURE: 70 MMHG | WEIGHT: 203 LBS | HEIGHT: 67 IN | SYSTOLIC BLOOD PRESSURE: 128 MMHG | BODY MASS INDEX: 31.86 KG/M2

## 2021-04-22 DIAGNOSIS — I10 ESSENTIAL HYPERTENSION: ICD-10-CM

## 2021-04-22 DIAGNOSIS — E78.2 MIXED HYPERLIPIDEMIA: ICD-10-CM

## 2021-04-22 DIAGNOSIS — E11.9 TYPE 2 DIABETES MELLITUS WITHOUT COMPLICATION, WITHOUT LONG-TERM CURRENT USE OF INSULIN (HCC): Primary | ICD-10-CM

## 2021-04-22 PROCEDURE — 99214 OFFICE O/P EST MOD 30 MIN: CPT | Performed by: FAMILY MEDICINE

## 2021-04-22 PROCEDURE — 3051F HG A1C>EQUAL 7.0%<8.0%: CPT | Performed by: FAMILY MEDICINE

## 2021-04-22 PROCEDURE — 99213 OFFICE O/P EST LOW 20 MIN: CPT

## 2021-04-22 RX ORDER — METFORMIN HYDROCHLORIDE 750 MG/1
1000 TABLET, EXTENDED RELEASE ORAL EVERY 12 HOURS
Qty: 180 TABLET | Refills: 3 | Status: SHIPPED | OUTPATIENT
Start: 2021-04-22 | End: 2021-08-06 | Stop reason: SINTOL

## 2021-04-22 RX ORDER — GLIMEPIRIDE 2 MG/1
TABLET ORAL
Qty: 180 TABLET | Refills: 3 | Status: SHIPPED | OUTPATIENT
Start: 2021-04-22 | End: 2021-04-22 | Stop reason: SDUPTHER

## 2021-04-22 RX ORDER — LANCETS 30 GAUGE
1 EACH MISCELLANEOUS 2 TIMES DAILY
Qty: 200 EACH | Refills: 3 | Status: SHIPPED | OUTPATIENT
Start: 2021-04-22 | End: 2021-04-22 | Stop reason: SDUPTHER

## 2021-04-22 RX ORDER — OMEPRAZOLE 20 MG/1
CAPSULE, DELAYED RELEASE ORAL
Qty: 90 CAPSULE | Refills: 3 | Status: SHIPPED | OUTPATIENT
Start: 2021-04-22 | End: 2021-04-22 | Stop reason: SDUPTHER

## 2021-04-22 RX ORDER — LISINOPRIL 20 MG/1
TABLET ORAL
Qty: 90 TABLET | Refills: 3 | Status: SHIPPED | OUTPATIENT
Start: 2021-04-22 | End: 2021-08-06

## 2021-04-22 RX ORDER — METFORMIN HYDROCHLORIDE 750 MG/1
1000 TABLET, EXTENDED RELEASE ORAL EVERY 12 HOURS
Qty: 180 TABLET | Refills: 3 | Status: SHIPPED | OUTPATIENT
Start: 2021-04-22 | End: 2021-04-22 | Stop reason: SDUPTHER

## 2021-04-22 RX ORDER — GLIMEPIRIDE 2 MG/1
TABLET ORAL
Qty: 180 TABLET | Refills: 3 | Status: SHIPPED | OUTPATIENT
Start: 2021-04-22 | End: 2022-08-12 | Stop reason: SDUPTHER

## 2021-04-22 RX ORDER — OMEPRAZOLE 20 MG/1
CAPSULE, DELAYED RELEASE ORAL
Qty: 90 CAPSULE | Refills: 3 | Status: SHIPPED | OUTPATIENT
Start: 2021-04-22 | End: 2022-08-12 | Stop reason: SDUPTHER

## 2021-04-22 RX ORDER — LANCETS 30 GAUGE
1 EACH MISCELLANEOUS 2 TIMES DAILY
Qty: 200 EACH | Refills: 3 | Status: ON HOLD
Start: 2021-04-22 | End: 2021-12-06 | Stop reason: HOSPADM

## 2021-04-22 RX ORDER — GLUCOSAMINE HCL/CHONDROITIN SU 500-400 MG
CAPSULE ORAL
Qty: 200 STRIP | Refills: 3 | Status: ON HOLD | OUTPATIENT
Start: 2021-04-22 | End: 2021-12-06 | Stop reason: HOSPADM

## 2021-04-22 RX ORDER — LISINOPRIL 20 MG/1
TABLET ORAL
Qty: 90 TABLET | Refills: 3 | Status: SHIPPED | OUTPATIENT
Start: 2021-04-22 | End: 2021-04-22 | Stop reason: SDUPTHER

## 2021-04-22 ASSESSMENT — PATIENT HEALTH QUESTIONNAIRE - PHQ9
SUM OF ALL RESPONSES TO PHQ9 QUESTIONS 1 & 2: 0
SUM OF ALL RESPONSES TO PHQ QUESTIONS 1-9: 0
SUM OF ALL RESPONSES TO PHQ QUESTIONS 1-9: 0

## 2021-04-22 ASSESSMENT — ENCOUNTER SYMPTOMS
RESPIRATORY NEGATIVE: 1
BACK PAIN: 1
EYES NEGATIVE: 1
DIARRHEA: 1
CONSTIPATION: 0
NAUSEA: 0
ALLERGIC/IMMUNOLOGIC NEGATIVE: 1

## 2021-04-22 NOTE — PATIENT INSTRUCTIONS
Hospital Outpatient Visit on 04/09/2021   Component Date Value Ref Range Status    Hemoglobin A1C 04/09/2021 7.8* 4.0 - 6.0 % Final    Estimated Avg Glucose 04/09/2021 177  mg/dL Final    Comment: The ADA and AACC recommend providing the estimated average glucose result to permit better   patient understanding of their HBA1c result.       WBC 04/09/2021 8.3  3.5 - 11.3 k/uL Final    RBC 04/09/2021 5.12* 3.95 - 5.11 m/uL Final    Hemoglobin 04/09/2021 14.1  11.9 - 15.1 g/dL Final    Hematocrit 04/09/2021 42.5  36.3 - 47.1 % Final    MCV 04/09/2021 83.0  82.6 - 102.9 fL Final    MCH 04/09/2021 27.5  25.2 - 33.5 pg Final    MCHC 04/09/2021 33.2  25.2 - 33.5 g/dL Final    RDW 04/09/2021 13.2  11.8 - 14.4 % Final    Platelets 04/70/6357 277  138 - 453 k/uL Final    MPV 04/09/2021 9.6  8.1 - 13.5 fL Final    NRBC Automated 04/09/2021 0.0  0.0 per 100 WBC Final    Differential Type 04/09/2021 NOT REPORTED   Final    Seg Neutrophils 04/09/2021 55  36 - 65 % Final    Lymphocytes 04/09/2021 35  24 - 43 % Final    Monocytes 04/09/2021 6  3 - 12 % Final    Eosinophils % 04/09/2021 2  1 - 4 % Final    Basophils 04/09/2021 1  0 - 2 % Final    Immature Granulocytes 04/09/2021 1* 0 % Final    Segs Absolute 04/09/2021 4.64  1.50 - 8.10 k/uL Final    Absolute Lymph # 04/09/2021 2.89  1.10 - 3.70 k/uL Final    Absolute Mono # 04/09/2021 0.45  0.10 - 1.20 k/uL Final    Absolute Eos # 04/09/2021 0.16  0.00 - 0.44 k/uL Final    Basophils Absolute 04/09/2021 0.05  0.00 - 0.20 k/uL Final    Absolute Immature Granulocyte 04/09/2021 0.06  0.00 - 0.30 k/uL Final    WBC Morphology 04/09/2021 NOT REPORTED   Final    RBC Morphology 04/09/2021 NOT REPORTED   Final    Platelet Estimate 74/37/0930 NOT REPORTED   Final    Glucose 04/09/2021 190* 70 - 99 mg/dL Final    BUN 04/09/2021 16  8 - 23 mg/dL Final    CREATININE 04/09/2021 0.74  0.50 - 0.90 mg/dL Final    Bun/Cre Ratio 04/09/2021 22* 9 - 20 Final    Calcium Very high   Based on AHA Guidelines for fasting triglyceride, October 2012.          VLDL 04/09/2021 NOT REPORTED* 1 - 30 mg/dL Final

## 2021-04-22 NOTE — PROGRESS NOTES
2021     Kala Coates (:  1955) is a 72 y.o. female, here for evaluation of the following medical concerns:    Diabetes  Pertinent negatives for diabetes include no chest pain and no fatigue. Hypertension  Associated symptoms include neck pain (improved after surgery). Pertinent negatives include no chest pain. Scheduled 3 month follow up on diabetes, htn, hyperlipidemia. I have reviewed the patient's medical history in detail and updated the computerized patient record. She reports 170's, sometimes 200 plus range readings. . Poor control with a1c at 11% on prior check. She had a month or so of noncompliance at that time. We restarted the amaryl and metformin and down to 6.9% by may 2020 follow up . She has had diarrhea , gas, and rare incontinence on the metformin, so she has not been as compliant with meds. She wasn't sure which drug was causing the issue so she was holding the amaryl as well. a1c up to 9% in December. She slacked on diet down in [place], she has been compliant with the medications \"95%\"    Past Medical History:   Diagnosis Date    Cancer [facility])     Right Breast cancer    Cervical radiculopathy     DDD (degenerative disc disease)     cervical    GERD (gastroesophageal reflux disease)     Hyperlipidemia     Hypertension     Impaired fasting blood sugar     Kidney stone     Myopia with astigmatism and presbyopia     Obesity     Osteopenia     Renal cell carcinoma ([facility] [address] 06/10/13    right kidney: clear cell type. Corine grade 2 of 4.  2.5cm limited to kidney    Stenosis of cervical spine with myelopathy (HCC)     Stress incontinence     Wears glasses      Past Surgical History:   Procedure Laterality Date    BLADDER SUSPENSION      incontinence    BREAST BIOPSY Right 2007    wire localization     CERVICAL SPINE SURGERY  2018     ANTERIOR C4-5, C5-6 ARTHROPLASTY, (ONE PLATE AND FOUR SCREWS REMOVED C6-C7 AND DISCARDED    COLONOSCOPY  05/19/2008    COLONOSCOPY  05/03/2017    [id number]    HYSTERECTOMY, TOTAL ABDOMINAL  2011    with bladder lift    MASTECTOMY Bilateral 12/11/2007    lymphnodes were also removed    NECK SURGERY  06/30/2010    cervical 6-7 microdiscectomy. Dr. Jessica Kurtz Right 06/10/2013    limited to kidney    CA OFFICE/OUTPT VISIT,PROCEDURE ONLY N/A 5/11/2018    ANTERIOR C4-5, C5-6 ARTHROPLASTY WITH , SUPINE POSITION, MICROSCOPE, C-ARM, [employer](REP NOTIFIED), EVOKES; (ONE PLATE AND FOUR SCREWS REMOVED C6-C7 AND DISCARDED) performed by Damaris Duane, DO at [address] Left 05/30/2019    TUBAL LIGATION Bilateral 1988    UPPER GASTROINTESTINAL ENDOSCOPY  01/12/10    normal.  Dr. Edy Azul     \      Review of Systems   Constitutional: Negative. Negative for fatigue and unexpected weight change. HENT: Negative. Eyes: Negative. Respiratory: Negative. Cardiovascular: Negative. Negative for chest pain. Gastrointestinal: Positive for diarrhea (intermittent, metformin related). Negative for constipation and nausea. Endocrine: Negative. Genitourinary: Negative. Negative for flank pain. Musculoskeletal: Positive for arthralgias (right shoulder), back pain (left lower back) and neck pain (improved after surgery). Negative for neck stiffness. Skin: Negative. Allergic/Immunologic: Negative. Neurological: Negative. Hematological: Negative. Psychiatric/Behavioral: Negative. Prior to Visit Medications    Medication Sig Taking?  Authorizing Provider   aspirin 81 MG EC tablet Indications: taking PRN  Yes Historical Provider, MD   VITAMIN D PO Take by mouth Yes Historical Provider, MD   CALCIUM PO Take by mouth Yes Historical Provider, MD   lisinopril (PRINIVIL;ZESTRIL) 20 MG tablet TAKE 1 TABLET DAILY Yes Roman Gibbs MD   metoprolol tartrate (LOPRESSOR) 25 MG tablet TAKE 1 TABLET TWICE A DAY Yes Lizzette Melendez and Affect: Mood normal.         Behavior: Behavior normal.         Thought Content:  Thought content normal.         Judgment: Judgment normal.     /70 (Site: Left Upper Arm, Position: Sitting, Cuff Size: Large Adult)   Pulse 72   Ht 5' 7\" (1.702 m)   Wt 203 lb (92.1 kg)   LMP  (LMP Unknown)   BMI 31.79 kg/m²   Hospital Outpatient Visit on 04/09/2021   Component Date Value Ref Range Status    Hemoglobin A1C 04/09/2021 7.8* 4.0 - 6.0 % Final    Estimated Avg Glucose 04/09/2021 177  mg/dL Final    WBC 04/09/2021 8.3  3.5 - 11.3 k/uL Final    RBC 04/09/2021 5.12* 3.95 - 5.11 m/uL Final    Hemoglobin 04/09/2021 14.1  11.9 - 15.1 g/dL Final    Hematocrit 04/09/2021 42.5  36.3 - 47.1 % Final    MCV 04/09/2021 83.0  82.6 - 102.9 fL Final    MCH 04/09/2021 27.5  25.2 - 33.5 pg Final    MCHC 04/09/2021 33.2  25.2 - 33.5 g/dL Final    RDW 04/09/2021 13.2  11.8 - 14.4 % Final    Platelets 47/17/8673 277  138 - 453 k/uL Final    MPV 04/09/2021 9.6  8.1 - 13.5 fL Final    NRBC Automated 04/09/2021 0.0  0.0 per 100 WBC Final    Differential Type 04/09/2021 NOT REPORTED   Final    Seg Neutrophils 04/09/2021 55  36 - 65 % Final    Lymphocytes 04/09/2021 35  24 - 43 % Final    Monocytes 04/09/2021 6  3 - 12 % Final    Eosinophils % 04/09/2021 2  1 - 4 % Final    Basophils 04/09/2021 1  0 - 2 % Final    Immature Granulocytes 04/09/2021 1* 0 % Final    Segs Absolute 04/09/2021 4.64  1.50 - 8.10 k/uL Final    Absolute Lymph # 04/09/2021 2.89  1.10 - 3.70 k/uL Final    Absolute Mono # 04/09/2021 0.45  0.10 - 1.20 k/uL Final    Absolute Eos # 04/09/2021 0.16  0.00 - 0.44 k/uL Final    Basophils Absolute 04/09/2021 0.05  0.00 - 0.20 k/uL Final    Absolute Immature Granulocyte 04/09/2021 0.06  0.00 - 0.30 k/uL Final    WBC Morphology 04/09/2021 NOT REPORTED   Final    RBC Morphology 04/09/2021 NOT REPORTED   Final    Platelet Estimate 35/09/9611 NOT REPORTED   Final    Glucose 04/09/2021 190* 70 - 99 mg/dL Final    BUN 04/09/2021 16  8 - 23 mg/dL Final    CREATININE 04/09/2021 0.74  0.50 - 0.90 mg/dL Final    Bun/Cre Ratio 04/09/2021 22* 9 - 20 Final    Calcium 04/09/2021 9.6  8.6 - 10.4 mg/dL Final    Sodium 04/09/2021 143  135 - 144 mmol/L Final    Potassium 04/09/2021 3.7  3.7 - 5.3 mmol/L Final    Chloride 04/09/2021 105  98 - 107 mmol/L Final    CO2 04/09/2021 23  20 - 31 mmol/L Final    Anion Gap 04/09/2021 15  9 - 17 mmol/L Final    Alkaline Phosphatase 04/09/2021 125* 35 - 104 U/L Final    ALT 04/09/2021 17  5 - 33 U/L Final    AST 04/09/2021 14  <32 U/L Final    Total Bilirubin 04/09/2021 0.36  0.3 - 1.2 mg/dL Final    Total Protein 04/09/2021 7.3  6.4 - 8.3 g/dL Final    Albumin 04/09/2021 4.3  3.5 - 5.2 g/dL Final    Albumin/Globulin Ratio 04/09/2021 1.4  1.0 - 2.5 Final    GFR Non- 04/09/2021 >60  >60 mL/min Final    GFR  04/09/2021 >60  >60 mL/min Final    GFR Comment 04/09/2021        Final    GFR Staging 04/09/2021 NOT REPORTED   Final    Cholesterol 04/09/2021 195  <200 mg/dL Final    HDL 04/09/2021 47  >40 mg/dL Final    LDL Cholesterol 04/09/2021 114  0 - 130 mg/dL Final    Chol/HDL Ratio 04/09/2021 4.1  <5 Final    Triglycerides 04/09/2021 170* <150 mg/dL Final    VLDL 04/09/2021 NOT REPORTED* 1 - 30 mg/dL Final       ASSESSMENT/PLAN:  Encounter Diagnoses   Name Primary?  Type 2 diabetes mellitus without complication, without long-term current use of insulin (HCC) Yes    Essential hypertension     Mixed hyperlipidemia        Diabetes: better compliance with medications this interval.  Improved a1c down to 7.8%. She desires better diet and cont. Current medication/dosing over the next interval.      Htn: good control at present. Cont current lopressor and lisinopril. Hyperlipidemia; recent labs reviewed good control on simvastatin,. An electronic signature was used to authenticate this note.     --Antony Arroyo MD on 4/22/2021 at 12:57 PM

## 2021-07-23 ENCOUNTER — TELEPHONE (OUTPATIENT)
Dept: FAMILY MEDICINE CLINIC | Age: 66
End: 2021-07-23

## 2021-07-23 NOTE — TELEPHONE ENCOUNTER
[facility] Transitions Initial Follow Up Call    Outreach made within 2 business days of discharge: Yes    Patient: Brittany Deluca Patient : 1955   MRN: [id number]  Reason for Admission: abd pain and kidney stone Discharge Date: 2021       Spoke with: patient, she only wants to see Dr Kashif Delgado and the soonest I found was  at 6 am. She is seeing the urologist next week to see if this pain is related to the kidney stone. Discharge department/facility: [facility] Interactive Patient Contact:  Was patient able to fill all prescriptions: Yes  Was patient instructed to bring all medications to the follow-up visit: Yes  Is patient taking all medications as directed in the discharge summary?  Yes  Does patient understand their discharge instructions: Yes  Does patient have questions or concerns that need addressed prior to 7-14 day follow up office visit: no    Scheduled appointment with PCP within 7-14 days    Follow Up  Future Appointments   Date Time Provider Carlos Galvin   [facility] 11[facility]   10/22/2021  8:00 AM Scottie Ellis MD [facility]       Fallon Restrepo CMA

## 2021-08-06 ENCOUNTER — OFFICE VISIT (OUTPATIENT)
Dept: FAMILY MEDICINE CLINIC | Age: 66
End: 2021-08-06
Payer: MEDICARE

## 2021-08-06 VITALS
WEIGHT: 202 LBS | BODY MASS INDEX: 31.71 KG/M2 | HEART RATE: 72 BPM | SYSTOLIC BLOOD PRESSURE: 128 MMHG | DIASTOLIC BLOOD PRESSURE: 72 MMHG | HEIGHT: 67 IN

## 2021-08-06 DIAGNOSIS — C64.1 RENAL CELL CARCINOMA OF RIGHT KIDNEY (HCC): ICD-10-CM

## 2021-08-06 DIAGNOSIS — N20.0 KIDNEY STONE: ICD-10-CM

## 2021-08-06 DIAGNOSIS — R10.9 RIGHT SIDED ABDOMINAL PAIN: Primary | ICD-10-CM

## 2021-08-06 DIAGNOSIS — I10 ESSENTIAL HYPERTENSION: ICD-10-CM

## 2021-08-06 PROCEDURE — 99213 OFFICE O/P EST LOW 20 MIN: CPT | Performed by: FAMILY MEDICINE

## 2021-08-06 PROCEDURE — 99214 OFFICE O/P EST MOD 30 MIN: CPT | Performed by: FAMILY MEDICINE

## 2021-08-06 PROCEDURE — 1111F DSCHRG MED/CURRENT MED MERGE: CPT | Performed by: FAMILY MEDICINE

## 2021-08-06 RX ORDER — LISINOPRIL 40 MG/1
TABLET ORAL
Qty: 90 TABLET | Refills: 3 | Status: ON HOLD | OUTPATIENT
Start: 2021-08-06 | End: 2021-12-06 | Stop reason: HOSPADM

## 2021-08-06 ASSESSMENT — ENCOUNTER SYMPTOMS
ALLERGIC/IMMUNOLOGIC NEGATIVE: 1
GASTROINTESTINAL NEGATIVE: 1
EYES NEGATIVE: 1
BACK PAIN: 1
RESPIRATORY NEGATIVE: 1
ABDOMINAL PAIN: 0

## 2021-08-06 NOTE — PROGRESS NOTES
Post-Discharge Transitional Care Management Services or Hospital Follow Up      Aidee Willard   YOB: 1955    Date of Office Visit:  8/6/2021  Date of Hospital Admission: 5/11/18  Date of Hospital Discharge: 5/13/18  Readmission Risk Score(high >=14%. Medium >=10%):No data recorded    Care management risk score Rising risk (score 2-5) and Complex Care (Scores >=6): 5     Non face to face  following discharge, date last encounter closed (first attempt may have been earlier): 7/23/2021  3:58 PM 7/23/2021  3:58 PM    Call initiated 2 business days of discharge: Yes     Patient Active Problem List   Diagnosis    Cancer ([facility] [address]    Hypertension    Renal cell carcinoma ([facility] [address]    DDD (degenerative disc disease)    Kidney stone    Stress incontinence    GERD (gastroesophageal reflux disease)    Obesity    Chest pain    Impaired fasting blood sugar    Cervical disc disorder    Type 2 diabetes mellitus ([facility] [address]    Osteopenia       No Known Allergies    Medications listed as ordered at the time of discharge from hospital   V[facility]quez, [address] [place] [address] Medication Instructions UQP:[id number]    Printed on:08/06/21 1512   Medication Information                      blood glucose monitor strips  Test bid times a day & as needed for symptoms of irregular blood glucose. Dispense sufficient amount for indicated testing frequency plus additional to accommodate PRN testing needs.              glimepiride (AMARYL) 2 MG tablet  Take 1 tablet by mouth twice daily with meals             Lancets MISC  1 each by Does not apply route 2 times daily             lisinopril (PRINIVIL;ZESTRIL) 20 MG tablet  TAKE 1 TABLET DAILY             metoprolol tartrate (LOPRESSOR) 25 MG tablet  TAKE 1 TABLET TWICE A DAY             nitroGLYCERIN (NITROSTAT) 0.4 MG SL tablet  Place 1 tablet under the tongue every 5 minutes as needed for Chest pain             omeprazole (PRILOSEC) 20 MG delayed release capsule  TAKE 1 CAPSULE DAILY simvastatin (ZOCOR) 40 MG tablet  TAKE 1 TABLET NIGHTLY             VITAMIN D PO  Take by mouth                   Medications marked \"taking\" at this time  Outpatient Medications Marked as Taking for the 8/6/21 encounter (Office Visit) with Cheli Arroyo MD   Medication Sig Dispense Refill    lisinopril (PRINIVIL;ZESTRIL) 20 MG tablet TAKE 1 TABLET DAILY (Patient taking differently: 40 mg TAKE 1 TABLET DAILY) 90 tablet 3    metoprolol tartrate (LOPRESSOR) 25 MG tablet TAKE 1 TABLET TWICE A  tablet 3    omeprazole (PRILOSEC) 20 MG delayed release capsule TAKE 1 CAPSULE DAILY 90 capsule 3    Lancets MISC 1 each by Does not apply route 2 times daily 200 each 3    VITAMIN D PO Take by mouth      simvastatin (ZOCOR) 40 MG tablet TAKE 1 TABLET NIGHTLY 90 tablet 3    [DISCONTINUED] ibandronate (BONIVA) 150 MG tablet Take 1 tablet by mouth every 30 days Take one (1) tablet once per month in the morning with a full glass of water, on an empty stomach, and do not take anything else by mouth or lie down for the next 30 minutes. 12 tablet 3        Medications patient taking as of now reconciled against medications ordered at time of hospital discharge: Yes    Chief Complaint   Patient presents with   [address] from [facility] ER   R side pain-? kidney stone    Discuss Medications     ER physician increased Lisinopril to 40mg d/t elevated BP        HPI scheduled transitional care visit for recent hospital stay. Intractable abdominal pain along right side. 13mm stone in right kidney. Urology consulted. She had renal cell carcinoma resected from the right side. Not sure the stone was causing the pain on urology consult. General surgery consulted, no concerns for surgical abdomen or source. MRI of lumbar spine completed. Lisinopril dose increased due to elevated bp during admission. Inpatient course: Discharge summary reviewed- see chart.     Interval history/Current status:     Review of Systems   Constitutional: Negative. HENT: Negative. Eyes: Negative. Respiratory: Negative. Cardiovascular: Negative. Gastrointestinal: Negative. Negative for abdominal pain (resolved). Endocrine: Negative. Genitourinary: Negative. Musculoskeletal: Positive for back pain. Skin: Negative. Allergic/Immunologic: Negative. Neurological: Negative. Hematological: Negative. Psychiatric/Behavioral: Negative. Vitals:    08/06/21 1108   BP: 128/72   Site: Left Upper Arm   Position: Sitting   Cuff Size: Large Adult   Pulse: 72   Weight: 202 lb (91.6 kg)   Height: 5' 7\" (1.702 m)     Body mass index is 31.64 kg/m². Wt Readings from Last 3 Encounters:   08/06/21 202 lb (91.6 kg)   04/22/21 203 lb (92.1 kg)   12/30/20 203 lb (92.1 kg)     BP Readings from Last 3 Encounters:   08/06/21 128/72   04/22/21 128/70   12/30/20 126/80       Physical Exam  Constitutional:       General: She is not in acute distress. Appearance: She is normal weight. She is not toxic-appearing. HENT:      Head: Normocephalic and atraumatic. Nose: Nose normal.   Eyes:      Extraocular Movements: Extraocular movements intact. Cardiovascular:      Rate and Rhythm: Normal rate. Pulses: Normal pulses. Pulmonary:      Effort: Pulmonary effort is normal. No respiratory distress. Musculoskeletal:         General: Normal range of motion. Cervical back: Normal range of motion. Right lower leg: No edema. Left lower leg: No edema. Skin:     Findings: No rash. Neurological:      Mental Status: She is alert. Psychiatric:         Mood and Affect: Mood normal.         Behavior: Behavior normal.         Thought Content:  Thought content normal.         Judgment: Judgment normal.     /72 (Site: Left Upper Arm, Position: Sitting, Cuff Size: Large Adult)   Pulse 72   Ht 5' 7\" (1.702 m)   Wt 202 lb (91.6 kg)   LMP  (LMP Unknown)   BMI 31.64 kg/m² Assessment/Plan:  Encounter Diagnoses   Name Primary?  Right sided abdominal pain Yes    Kidney stone     Renal cell carcinoma of right kidney ([facility] [address]     Essential hypertension          Source for abdominal pain , right side, unclear. Resolved, non recurrent. Observation. 13mm kidney stone in right kidney. Likely not the source. Urology planning to go up and pulverize stone, place stent. S/p partial resection or right kidney due to cancer. No evidence for recurrent cancer on recent imaging. Rec. Follow up for recurrent abdominal pain. Htn; elevations in the hospital, lisinopril increased to 40mg/day. bp good today. Plan to cont. 40mg dose.   Refills provided with updated Rx    Medical Decision Making: moderate complexity

## 2021-12-03 ENCOUNTER — APPOINTMENT (OUTPATIENT)
Dept: GENERAL RADIOLOGY | Age: 66
End: 2021-12-03
Payer: MEDICARE

## 2021-12-03 ENCOUNTER — HOSPITAL ENCOUNTER (INPATIENT)
Dept: CARDIAC CATH/INVASIVE PROCEDURES | Age: 66
LOS: 3 days | Discharge: HOME | DRG: 247 | End: 2021-12-06
Attending: INTERNAL MEDICINE | Admitting: INTERNAL MEDICINE
Payer: MEDICARE

## 2021-12-03 ENCOUNTER — HOSPITAL ENCOUNTER (EMERGENCY)
Age: 66
Discharge: ANOTHER HOSPITAL | End: 2021-12-03
Attending: EMERGENCY MEDICINE
Payer: MEDICARE

## 2021-12-03 VITALS
SYSTOLIC BLOOD PRESSURE: 185 MMHG | OXYGEN SATURATION: 98 % | HEIGHT: 67 IN | HEART RATE: 73 BPM | WEIGHT: 198 LBS | BODY MASS INDEX: 31.08 KG/M2 | RESPIRATION RATE: 22 BRPM | TEMPERATURE: 97.8 F | DIASTOLIC BLOOD PRESSURE: 79 MMHG

## 2021-12-03 DIAGNOSIS — I21.3 ST ELEVATION MYOCARDIAL INFARCTION (STEMI), UNSPECIFIED ARTERY (HCC): Primary | ICD-10-CM

## 2021-12-03 DIAGNOSIS — I21.11 ACUTE ST ELEVATION MYOCARDIAL INFARCTION (STEMI) INVOLVING RIGHT CORONARY ARTERY (HCC): ICD-10-CM

## 2021-12-03 PROBLEM — I21.9 ACUTE MYOCARDIAL INFARCTION (HCC): Status: ACTIVE | Noted: 2021-12-03

## 2021-12-03 LAB
ABSOLUTE EOS #: 0.08 K/UL (ref 0–0.44)
ABSOLUTE IMMATURE GRANULOCYTE: 0.03 K/UL (ref 0–0.3)
ABSOLUTE LYMPH #: 2.35 K/UL (ref 1.1–3.7)
ABSOLUTE MONO #: 0.53 K/UL (ref 0.1–1.2)
ACTIVATED CLOTTING TIME: 219 SEC (ref 79–149)
ANION GAP SERPL CALCULATED.3IONS-SCNC: 11 MMOL/L (ref 9–17)
BASOPHILS # BLD: 0 % (ref 0–2)
BASOPHILS ABSOLUTE: 0.03 K/UL (ref 0–0.2)
BUN BLDV-MCNC: 12 MG/DL (ref 8–23)
BUN/CREAT BLD: 18 (ref 9–20)
CALCIUM SERPL-MCNC: 9.7 MG/DL (ref 8.6–10.4)
CHLORIDE BLD-SCNC: 99 MMOL/L (ref 98–107)
CO2: 26 MMOL/L (ref 20–31)
CREAT SERPL-MCNC: 0.66 MG/DL (ref 0.5–0.9)
DIFFERENTIAL TYPE: ABNORMAL
EKG ATRIAL RATE: 77 BPM
EKG P AXIS: 48 DEGREES
EKG P-R INTERVAL: 224 MS
EKG Q-T INTERVAL: 386 MS
EKG QRS DURATION: 90 MS
EKG QTC CALCULATION (BAZETT): 436 MS
EKG R AXIS: 46 DEGREES
EKG T AXIS: 94 DEGREES
EKG VENTRICULAR RATE: 77 BPM
EOSINOPHILS RELATIVE PERCENT: 1 % (ref 1–4)
GFR AFRICAN AMERICAN: >60 ML/MIN
GFR NON-AFRICAN AMERICAN: >60 ML/MIN
GFR SERPL CREATININE-BSD FRML MDRD: ABNORMAL ML/MIN/{1.73_M2}
GFR SERPL CREATININE-BSD FRML MDRD: ABNORMAL ML/MIN/{1.73_M2}
GLUCOSE BLD-MCNC: 250 MG/DL (ref 70–99)
HCT VFR BLD CALC: 44.5 % (ref 36.3–47.1)
HEMOGLOBIN: 14.7 G/DL (ref 11.9–15.1)
IMMATURE GRANULOCYTES: 0 %
LYMPHOCYTES # BLD: 29 % (ref 24–43)
MCH RBC QN AUTO: 26.8 PG (ref 25.2–33.5)
MCHC RBC AUTO-ENTMCNC: 33 G/DL (ref 25.2–33.5)
MCV RBC AUTO: 81.2 FL (ref 82.6–102.9)
MONOCYTES # BLD: 7 % (ref 3–12)
NRBC AUTOMATED: 0 PER 100 WBC
PARTIAL THROMBOPLASTIN TIME: 28.3 SEC (ref 23.9–33.8)
PDW BLD-RTO: 13.2 % (ref 11.8–14.4)
PLATELET # BLD: 263 K/UL (ref 138–453)
PLATELET ESTIMATE: ABNORMAL
PMV BLD AUTO: 9.4 FL (ref 8.1–13.5)
POTASSIUM SERPL-SCNC: 4.3 MMOL/L (ref 3.7–5.3)
RBC # BLD: 5.48 M/UL (ref 3.95–5.11)
RBC # BLD: ABNORMAL 10*6/UL
SARS-COV-2, RAPID: NOT DETECTED
SEG NEUTROPHILS: 63 % (ref 36–65)
SEGMENTED NEUTROPHILS ABSOLUTE COUNT: 5.06 K/UL (ref 1.5–8.1)
SODIUM BLD-SCNC: 136 MMOL/L (ref 135–144)
SPECIMEN DESCRIPTION: NORMAL
TROPONIN INTERP: ABNORMAL
TROPONIN T: ABNORMAL NG/ML
TROPONIN, HIGH SENSITIVITY: 450 NG/L (ref 0–14)
WBC # BLD: 8.1 K/UL (ref 3.5–11.3)
WBC # BLD: ABNORMAL 10*3/UL

## 2021-12-03 PROCEDURE — 93005 ELECTROCARDIOGRAM TRACING: CPT | Performed by: EMERGENCY MEDICINE

## 2021-12-03 PROCEDURE — 6360000002 HC RX W HCPCS

## 2021-12-03 PROCEDURE — 2580000003 HC RX 258: Performed by: EMERGENCY MEDICINE

## 2021-12-03 PROCEDURE — B2151ZZ FLUOROSCOPY OF LEFT HEART USING LOW OSMOLAR CONTRAST: ICD-10-PCS | Performed by: INTERNAL MEDICINE

## 2021-12-03 PROCEDURE — 85025 COMPLETE CBC W/AUTO DIFF WBC: CPT

## 2021-12-03 PROCEDURE — C1769 GUIDE WIRE: HCPCS

## 2021-12-03 PROCEDURE — 94761 N-INVAS EAR/PLS OXIMETRY MLT: CPT

## 2021-12-03 PROCEDURE — 85730 THROMBOPLASTIN TIME PARTIAL: CPT

## 2021-12-03 PROCEDURE — C1760 CLOSURE DEV, VASC: HCPCS

## 2021-12-03 PROCEDURE — 4A023N7 MEASUREMENT OF CARDIAC SAMPLING AND PRESSURE, LEFT HEART, PERCUTANEOUS APPROACH: ICD-10-PCS | Performed by: INTERNAL MEDICINE

## 2021-12-03 PROCEDURE — B2111ZZ FLUOROSCOPY OF MULTIPLE CORONARY ARTERIES USING LOW OSMOLAR CONTRAST: ICD-10-PCS | Performed by: INTERNAL MEDICINE

## 2021-12-03 PROCEDURE — 2580000003 HC RX 258: Performed by: STUDENT IN AN ORGANIZED HEALTH CARE EDUCATION/TRAINING PROGRAM

## 2021-12-03 PROCEDURE — 71045 X-RAY EXAM CHEST 1 VIEW: CPT

## 2021-12-03 PROCEDURE — 6360000004 HC RX CONTRAST MEDICATION

## 2021-12-03 PROCEDURE — 2709999900 HC NON-CHARGEABLE SUPPLY

## 2021-12-03 PROCEDURE — 84484 ASSAY OF TROPONIN QUANT: CPT

## 2021-12-03 PROCEDURE — 99284 EMERGENCY DEPT VISIT MOD MDM: CPT

## 2021-12-03 PROCEDURE — 6370000000 HC RX 637 (ALT 250 FOR IP): Performed by: STUDENT IN AN ORGANIZED HEALTH CARE EDUCATION/TRAINING PROGRAM

## 2021-12-03 PROCEDURE — 80048 BASIC METABOLIC PNL TOTAL CA: CPT

## 2021-12-03 PROCEDURE — 2000000000 HC ICU R&B

## 2021-12-03 PROCEDURE — C1887 CATHETER, GUIDING: HCPCS

## 2021-12-03 PROCEDURE — 2500000003 HC RX 250 WO HCPCS

## 2021-12-03 PROCEDURE — 93458 L HRT ARTERY/VENTRICLE ANGIO: CPT | Performed by: INTERNAL MEDICINE

## 2021-12-03 PROCEDURE — 6370000000 HC RX 637 (ALT 250 FOR IP): Performed by: EMERGENCY MEDICINE

## 2021-12-03 PROCEDURE — 027034Z DILATION OF CORONARY ARTERY, ONE ARTERY WITH DRUG-ELUTING INTRALUMINAL DEVICE, PERCUTANEOUS APPROACH: ICD-10-PCS | Performed by: INTERNAL MEDICINE

## 2021-12-03 PROCEDURE — 6360000002 HC RX W HCPCS: Performed by: EMERGENCY MEDICINE

## 2021-12-03 PROCEDURE — C9606 PERC D-E COR REVASC W AMI S: HCPCS | Performed by: INTERNAL MEDICINE

## 2021-12-03 PROCEDURE — C1874 STENT, COATED/COV W/DEL SYS: HCPCS

## 2021-12-03 PROCEDURE — C1894 INTRO/SHEATH, NON-LASER: HCPCS

## 2021-12-03 PROCEDURE — 96374 THER/PROPH/DIAG INJ IV PUSH: CPT

## 2021-12-03 PROCEDURE — 6370000000 HC RX 637 (ALT 250 FOR IP)

## 2021-12-03 PROCEDURE — 85347 COAGULATION TIME ACTIVATED: CPT

## 2021-12-03 PROCEDURE — 96375 TX/PRO/DX INJ NEW DRUG ADDON: CPT

## 2021-12-03 PROCEDURE — C1725 CATH, TRANSLUMIN NON-LASER: HCPCS

## 2021-12-03 PROCEDURE — 87635 SARS-COV-2 COVID-19 AMP PRB: CPT

## 2021-12-03 RX ORDER — MORPHINE SULFATE 4 MG/ML
4 INJECTION, SOLUTION INTRAMUSCULAR; INTRAVENOUS ONCE
Status: COMPLETED | OUTPATIENT
Start: 2021-12-03 | End: 2021-12-03

## 2021-12-03 RX ORDER — SODIUM CHLORIDE 0.9 % (FLUSH) 0.9 %
5-40 SYRINGE (ML) INJECTION PRN
Status: DISCONTINUED | OUTPATIENT
Start: 2021-12-03 | End: 2021-12-06 | Stop reason: HOSPADM

## 2021-12-03 RX ORDER — HEPARIN SODIUM 1000 [USP'U]/ML
2000 INJECTION, SOLUTION INTRAVENOUS; SUBCUTANEOUS PRN
Status: DISCONTINUED | OUTPATIENT
Start: 2021-12-03 | End: 2021-12-03 | Stop reason: HOSPADM

## 2021-12-03 RX ORDER — SODIUM CHLORIDE 9 MG/ML
INJECTION, SOLUTION INTRAVENOUS CONTINUOUS
Status: DISCONTINUED | OUTPATIENT
Start: 2021-12-03 | End: 2021-12-03 | Stop reason: HOSPADM

## 2021-12-03 RX ORDER — ASPIRIN 81 MG/1
81 TABLET, CHEWABLE ORAL DAILY
Status: DISCONTINUED | OUTPATIENT
Start: 2021-12-03 | End: 2021-12-06 | Stop reason: HOSPADM

## 2021-12-03 RX ORDER — HEPARIN SODIUM 10000 [USP'U]/100ML
5-30 INJECTION, SOLUTION INTRAVENOUS CONTINUOUS
Status: DISCONTINUED | OUTPATIENT
Start: 2021-12-03 | End: 2021-12-03 | Stop reason: HOSPADM

## 2021-12-03 RX ORDER — HEPARIN SODIUM 1000 [USP'U]/ML
4000 INJECTION, SOLUTION INTRAVENOUS; SUBCUTANEOUS ONCE
Status: COMPLETED | OUTPATIENT
Start: 2021-12-03 | End: 2021-12-03

## 2021-12-03 RX ORDER — SODIUM CHLORIDE 0.9 % (FLUSH) 0.9 %
5-40 SYRINGE (ML) INJECTION EVERY 12 HOURS SCHEDULED
Status: DISCONTINUED | OUTPATIENT
Start: 2021-12-03 | End: 2021-12-06 | Stop reason: HOSPADM

## 2021-12-03 RX ORDER — ASPIRIN 81 MG/1
324 TABLET, CHEWABLE ORAL ONCE
Status: COMPLETED | OUTPATIENT
Start: 2021-12-03 | End: 2021-12-03

## 2021-12-03 RX ORDER — ACETAMINOPHEN 325 MG/1
650 TABLET ORAL EVERY 4 HOURS PRN
Status: DISCONTINUED | OUTPATIENT
Start: 2021-12-03 | End: 2021-12-06 | Stop reason: HOSPADM

## 2021-12-03 RX ORDER — ATORVASTATIN CALCIUM 40 MG/1
40 TABLET, FILM COATED ORAL NIGHTLY
Status: DISCONTINUED | OUTPATIENT
Start: 2021-12-03 | End: 2021-12-06 | Stop reason: HOSPADM

## 2021-12-03 RX ORDER — HEPARIN SODIUM 1000 [USP'U]/ML
4000 INJECTION, SOLUTION INTRAVENOUS; SUBCUTANEOUS PRN
Status: DISCONTINUED | OUTPATIENT
Start: 2021-12-03 | End: 2021-12-03 | Stop reason: HOSPADM

## 2021-12-03 RX ORDER — SODIUM CHLORIDE 9 MG/ML
25 INJECTION, SOLUTION INTRAVENOUS PRN
Status: DISCONTINUED | OUTPATIENT
Start: 2021-12-03 | End: 2021-12-06 | Stop reason: HOSPADM

## 2021-12-03 RX ADMIN — TICAGRELOR 90 MG: 90 TABLET ORAL at 20:36

## 2021-12-03 RX ADMIN — HEPARIN SODIUM 12 UNITS/KG/HR: 10000 INJECTION, SOLUTION INTRAVENOUS at 11:27

## 2021-12-03 RX ADMIN — SODIUM CHLORIDE: 9 INJECTION, SOLUTION INTRAVENOUS at 10:49

## 2021-12-03 RX ADMIN — HEPARIN SODIUM 4000 UNITS: 1000 INJECTION INTRAVENOUS; SUBCUTANEOUS at 11:20

## 2021-12-03 RX ADMIN — MORPHINE SULFATE 4 MG: 4 INJECTION, SOLUTION INTRAMUSCULAR; INTRAVENOUS at 10:55

## 2021-12-03 RX ADMIN — DESMOPRESSIN ACETATE 40 MG: 0.2 TABLET ORAL at 20:36

## 2021-12-03 RX ADMIN — ASPIRIN 324 MG: 81 TABLET, CHEWABLE ORAL at 10:49

## 2021-12-03 RX ADMIN — SODIUM CHLORIDE, PRESERVATIVE FREE 10 ML: 5 INJECTION INTRAVENOUS at 20:36

## 2021-12-03 ASSESSMENT — PAIN DESCRIPTION - ONSET: ONSET: GRADUAL

## 2021-12-03 ASSESSMENT — PAIN SCALES - GENERAL
PAINLEVEL_OUTOF10: 0
PAINLEVEL_OUTOF10: 5
PAINLEVEL_OUTOF10: 5
PAINLEVEL_OUTOF10: 0

## 2021-12-03 ASSESSMENT — PAIN DESCRIPTION - PAIN TYPE: TYPE: ACUTE PAIN

## 2021-12-03 ASSESSMENT — PAIN DESCRIPTION - LOCATION: LOCATION: CHEST

## 2021-12-03 ASSESSMENT — PAIN DESCRIPTION - DESCRIPTORS: DESCRIPTORS: PRESSURE

## 2021-12-03 ASSESSMENT — PAIN DESCRIPTION - ORIENTATION: ORIENTATION: MID

## 2021-12-03 NOTE — FLOWSHEET NOTE
rounding on ED    Assessment: Patient is sitting up in room.  is preset for support. They are well connected to their Confucianist. Patient is being transferred to [facility] via ground transport. Intervention: Engaged in conversation.  prayed with patient and notified Confucianist with her permission. Patient expressed appreciation for visit and offer of continued prayer. Plan: Chaplains are available on site or on call 24/7 for spiritual and emotional support. 12/03/21 1110   Encounter Summary   Services provided to: Patient and family together   Referral/Consult From: [address] [place] [address] Drive; Family members;  Restorationist/carmel community   Place of [facility] Completed   Continue Visiting   (12/3/21)   Complexity of Encounter Moderate   Length of Encounter 30 minutes   Spiritual Assessment Completed Yes   Spiritual/Temple   Type Spiritual support   Assessment Approachable   Intervention Active listening; Explored coping resources; Prayer   Outcome Expressed gratitude; Engaged in conversation

## 2021-12-03 NOTE — H&P
[facility]    Consult / H&P               Today's Date: 12/3/2021  Patient Name: Cecile Rios  Date of admission: 12/3/2021  1:00 PM  Patient's age: 77 y.o., 1955  Admission Dx: No admission diagnoses are documented for this encounter. Reason for Consult:  Cardiac evaluation    Requesting Physician: No admitting provider for patient encounter. CHIEF COMPLAINT:  Chest pain     History Obtained From:  patient    HISTORY OF PRESENT ILLNESS:    78 yo female presented with chest pain. Retrosternal in location with left arm radiation. She was having chest pain since Wednesday night. EKG in ER showed ST elevation in inferior leads with reciprocal changes   Was transferred here for emergent cardiac cath     Past Medical History:   has a past medical history of Cancer ([address] Cervical radiculopathy, DDD (degenerative disc disease), GERD (gastroesophageal reflux disease), Hyperlipidemia, Hypertension, Impaired fasting blood sugar, Kidney stone, Myopia with astigmatism and presbyopia, Obesity, Osteopenia, Renal cell carcinoma ([address] Stenosis of cervical spine with myelopathy ([address] Stress incontinence, and Wears glasses. Past Surgical History:   has a past surgical history that includes Mastectomy (Bilateral, 12/11/2007); partial nephrectomy (Right, 06/10/2013); Hysterectomy, total abdominal (2011); bladder suspension (2011); Neck surgery (06/30/2010); Tubal ligation (Bilateral, 1988); Colonoscopy (05/19/2008); Upper gastrointestinal endoscopy (01/12/2010); [place] tooth extraction; Breast biopsy (Right, 11/20/2007); Colonoscopy (05/03/2017); Cervical spine surgery (05/11/2018); pr office/outpt visit,procedure only (N/A, 05/11/2018); Total shoulder arthroplasty (Left, 05/30/2019); and cystourethroscopy (Bilateral, 09/02/2021). Home Medications:    Prior to Admission medications    Medication Sig Start Date End Date Taking?  Authorizing Provider   lisinopril (PRINIVIL;ZESTRIL) 40 MG tablet TAKE 1 TABLET DAILY 8/6/21   Francesca Wu MD   blood glucose monitor strips Test bid times a day & as needed for symptoms of irregular blood glucose. Dispense sufficient amount for indicated testing frequency plus additional to accommodate PRN testing needs. 4/22/21   Francesca Wu MD   metoprolol tartrate (LOPRESSOR) 25 MG tablet TAKE 1 TABLET TWICE A DAY 4/22/21   Francesca Wu MD   omeprazole (PRILOSEC) 20 MG delayed release capsule TAKE 1 CAPSULE DAILY 4/22/21   Francesca Wu MD   glimepiride (AMARYL) 2 MG tablet Take 1 tablet by mouth twice daily with meals 4/22/21   Francesca Wu MD   Lancets MISC 1 each by Does not apply route 2 times daily 4/22/21   Francesca Wu MD   VITAMIN D PO Take by mouth    Historical Provider, MD   simvastatin (ZOCOR) 40 MG tablet TAKE 1 TABLET NIGHTLY 12/30/20   Francesca Wu MD   nitroGLYCERIN (NITROSTAT) 0.4 MG SL tablet Place 1 tablet under the tongue every 5 minutes as needed for Chest pain 6/1/20   Francesca Wu MD      No current facility-administered medications for this encounter. Allergies:  Patient has no known allergies. Social History:   reports that she has never smoked. She has never used smokeless tobacco. She reports current alcohol use. She reports that she does not use drugs. Family History: family history includes Cancer in her brother, maternal grandmother, and mother; Dementia in her mother; Diabetes in her father and paternal grandfather; Heart Disease in her father and paternal grandmother; High Blood Pressure in her brother, father, and mother. No h/o sudden cardiac death. No for premature CAD    REVIEW OF SYSTEMS:    Constitutional: there has been no unanticipated weight loss. There's been No change in energy level, No change in activity level. Eyes: No visual changes or diplopia. No scleral icterus.   ENT: No Headaches  Cardiovascular: As mentioned in H&P   Respiratory: No previous pulmonary problems, No cough  Gastrointestinal: No abdominal pain. No change in bowel or bladder habits. Genitourinary: No dysuria, trouble voiding, or hematuria. Musculoskeletal:  No gait disturbance, No weakness or joint complaints. PHYSICAL EXAM:      LMP  (LMP Unknown)    Constitutional and General Appearance: alert, cooperative, no distress and appears stated age  [de-identified]: PERRL, no cervical lymphadenopathy. No masses palpable. Normal oral mucosa  Respiratory:  Normal excursion and expansion without use of accessory muscles  Resp Auscultation: Good respiratory effort. No for increased work of breathing. On auscultation: clear to auscultation bilaterally  Cardiovascular: The apical impulse is not displaced  Heart tones are crisp and normal. regular S1 and S2.  Jugular venous pulsation Normal  The carotid upstroke is normal in amplitude and contour without delay or bruit  Peripheral pulses are symmetrical and full   Abdomen:   No masses or tenderness  Bowel sounds present  Extremities:   No Cyanosis or Clubbing   Lower extremity edema: No   Skin: Warm and dry  Ne  Recent Labs     12/03/21  1044   WBC 8.1   HGB 14.7   HCT 44.5        BMP:   Recent Labs     12/03/21  1044      K 4.3   CO2 26   BUN 12   CREATININE 0.66   LABGLOM >60   GLUCOSE 250*     BNP: No results for input(s): BNP in the last 72 hours. PT/INR: No results for input(s): PROTIME, INR in the last 72 hours. APTT:  Recent Labs     12/03/21  1044   APTT 28.3     CARDIAC ENZYMES:No results for input(s): CKTOTAL, CKMB, CKMBINDEX, TROPONINI in the last 72 hours. FASTING LIPID PANEL:  Lab Results   Component Value Date    HDL 47 04/09/2021    TRIG 170 04/09/2021     LIVER PROFILE:No results for input(s): AST, ALT, LABALBU in the last 72 hours.     IMPRESSION:    Patient Active Problem List   Diagnosis    Cancer ([facility]    Hypertension    Renal cell carcinoma (HCC)    DDD (degenerative disc disease)    Kidney stone    Stress incontinence    GERD (gastroesophageal reflux disease) Obesity    Chest pain    Impaired fasting blood sugar    Cervical disc disorder    Type 2 diabetes mellitus (HCC)    Osteopenia       Assessment     1. Inferior STEMI   2  Chest pain since Wednesday night   3 DM   4. H/o renal cell cancer        RECOMMENDATIONS:  Proceed with emergent cardiac cath   Follow post cath orders       Pre Procedure Conscious Sedation Data:  ASA Class:    [] I [] II [] III [x] IV    Mallampati Class:  [] I [x] II [] III [] IV        Discussed with patient and Nurse. Electronically signed by Bernabe Mane MD on 12/3/2021 at 1:12 PM    [facility]      [phone]    From recent office visit    I reviewed the patient history personally, and examined by me during the visit. I have reviewed the H&P/Consult / Progress note as completed, and made appropriate changes to the patient exam and treatment plans.       I have reviewed the case in details including physical exam and treatment plan with resident / fellow / NP    Patient treatment plan was explained to patient, correction in notes was made as appropriate, and discussed final arrangement based on  my evaluation and exam.    Additional Recommendations:      [facility]

## 2021-12-03 NOTE — OP NOTE
[facility]    CARDIAC CATHETERIZATION    Date:   12/3/2021  Patient name:  Jordyn Schultz  Date of admission:  12/3/2021  1:00 PM  MRN:   [id number]  YOB: 1955    Operators:  Primary:   Isacc Cortés MD (Attending Physician)    Procedure performed:     [x] Left Heart Catheterization. [] Graft Angiography. [x] Left Ventriculography. [] Right Heart Catheterization. [] Coronary Angiography. [] Aortic Valve Studies. [x] PCI:      [] Other:       Pre Procedure Conscious Sedation Data:  ASA Class:    [] I [] II [] III [x] IV    Mallampati Class:  [x] I [] II [] III [] IV      Indication:  [x] STEMI      [] + Stress test  [] ACS      [] + EKG Changes  [] Non Q MI       [] Significant Risk Factors  [] Recurrent Angina             [] Diabetes Mellitus    [] New LBBB      [] Uncontrolled HTN. [] CHF / Low EF changes     [] Abnormal CTA / Ca Score      Procedure:  Access:  [x] Femoral  [] Radial  artery       [x] Right  [] Left    Procedure: After informed consent was obtained with explanation of the risks and benefits, patient was brought to the cath lab. The access area was prepped and draped in sterile fashion. 1% lidocaine was used for local block. The artery was cannulated with 6  Fr sheath with brisk arterial blood return. The side port was frequently flushed and aspirated with normal saline. Findings:   Angiographic Findings        Cardiac Arteries and Lesion Findings       LMCA: Normal 0% stenosis. LAD: Mild irregularities 10-20%. D1: ostial / proximal 75% stenosis     LCx: Mid area 30% stenosis   OM1: proximal long lesion 80%   OM2: Ostial 90% stenosis     RCA: Mid area 100% stenosis, LACEY 0 flow         Lesion on Mid RCA: Proximal subsection. 100% stenosis 25 mm length reduced     to 0%. Pre procedure LACEY 0 flow was noted. Post Procedure LACEY III flow     was present. Poor runoff was present. The lesion was diagnosed as High     Risk (C).          Devices used - Luge Wire 182 cm. Number of passes: 1.         - Euphora Balloon 2.5mm x 12mm. 2 inflation(s) to a max pressure of: 8     jordi. - Xience Julita 3.25 x 33 JOHN PAUL. 1 inflation(s) to a max pressure of: 12     jordi. Coronary Tree        Dominance: Right       LV Analysis   LV function assessed as:Abnormal.   Ejection Fraction   +----------------------------------------------------------------------+---+   ! Method                                                                ! EF%! +----------------------------------------------------------------------+---+   ! LV gram                                                               !50 !   +----------------------------------------------------------------------+---+        Procedure Summary        Multivessel CAD    Acute occlusion of the mid RCA, required PTCA -JOHN PAUL    Residual disease in branches, D1, Om1 and OM2. PLower normal LV function        Recommendations        Post MI protocol    Plan on 2nd stage stent to OM1, OM2 and possibly d1         Estimated Blood Loss: 5 mL      ____________________________________________________________________    History and Risk Factors    [x] Hypertension     [] Family history of CAD  [x] Hyperlipidemia     [] Cerebrovascular Disease   [] Prior MI       [] Peripheral Vascular disease   [] Prior PCI              [x] Diabetes Mellitus    [] Left Main PCI. [] Currently on Dialysis. [] Prior CABG. [] Currently smoker. [] Cardiac Arrest outside of healthcare facility. [] Yes    [x] No        Witnessed     [] Yes   [] No     Arrest after arrival of EMS  [] Yes   [] No     [] Cardiac Arrest at other Facility. [] Yes   [x] No    Pre-Procedure Information. Heart Failure       [] Yes    [x] No        Class  [] I      [] II  [] III    [] IV. New Diagnosis    [] Yes  [] No    HF Type      [] Systolic   [] Diastolic          [] Unknown.     Diagnostic Test:   EKG       [] Normal   [x] Abnormal    New antiarrhythmia medications:    [] Yes   [] No   New onset atrial fibrillation / Flutter     [] Yes   [] No   ECG Abnormalities:      [] V. Fib   [] Juany V. Tach           [] NS V. T   [] New LBBB           [] T. Inv  []  ST dev > 0.5 mm         [] PVC's freq  [] PVC's infrequent    Stress Test Performed:      [] Yes    [x] No     Type:     [] Stress Echo   [] Exercise Stress Test (no imaging)      [] Stress Nuclear  [] Stress Imaging     Results   [] Negative   [] Positive        [] Indeterminate  [] Unavailable     If Positive/ Risk / Extent of Ischemia:       [] Low  [] Intermediate         [] High  [] Unavailable      Cardiac CTA Performed:     [] Yes    [] No      Results   [] CAD   [] Non obstructive CAD      [] No CAD   [] Uncertain      [] Unknown   [] Structural Disease. Pre Procedure Medications:   [x] Yes    [] No         [x] ASA   [] Beta Blockers      [] Nitrate   [] Ca Channel Blockers      [] Ranolazine   [] Statin       [] Plavix/Others antiplatelets      Electronically signed on 12/3/2021 at 1:19 PM by:    Pearley Bumpers, MD  Fellow, [address]      I have reviewed the case / procedure with resident / fellow  I have examined the patient personally  Patient agree with treatment plan as discussed before, final arrangement based on my evaluation and exam.    Risk and benefit of procedure planned were explained in details. Procedure was performed by me personally, with all aspect of the procedure being done using standard protocol. Note was modified based on my own assessment and treatment.     Otilio Juan MD  [facility]

## 2021-12-03 NOTE — ED PROVIDER NOTES
[facility] ED  [address]  [place] [address]  Phone: [phone]  [facility]      Pt Name: Pushpa Hirsch  MRN: [id number]  [place] 1955  Date of evaluation: 12/3/2021    CHIEF COMPLAINT       Chief Complaint   Patient presents with    Chest Pain     mid chest, radiates to left arm at times, started Wed night/Thur [address]    Pushpa Hirsch is a 77 y.o. female who presents to the emergency room complaining of chest pain with radiation to her left arm intermittent since Wednesday night. Went to urgent care today was sent to the emergency department. No fevers chills cough congestion. No other recent illnesses. Denies cardiac history. REVIEW OF SYSTEMS       Constitutional: No fevers or chills   HEENT: No sore throat, rhinorrhea, or earache   Eyes: No blurry vision or double vision no drainage   Cardiovascular: Positive chest pain no tachycardia  Respiratory: No wheezing or shortness of breath no cough   Gastrointestinal: No nausea, vomiting, diarrhea, constipation, or abdominal pain   : No hematuria or dysuria   Musculoskeletal: No swelling or pain   Skin: No rash   Neurological: No focal neurologic complaints, paresthesias, weakness, or headache     PAST MEDICAL HISTORY    has a past medical history of Cancer ([address] Cervical radiculopathy, DDD (degenerative disc disease), GERD (gastroesophageal reflux disease), Hyperlipidemia, Hypertension, Impaired fasting blood sugar, Kidney stone, Myopia with astigmatism and presbyopia, Obesity, Osteopenia, Renal cell carcinoma ([address] Stenosis of cervical spine with myelopathy ([address] Stress incontinence, and Wears glasses. SURGICAL HISTORY      has a past surgical history that includes Mastectomy (Bilateral, 12/11/2007); partial nephrectomy (Right, 06/10/2013); Hysterectomy, total abdominal (2011); bladder suspension (2011); Neck surgery (06/30/2010);  Tubal ligation (Bilateral, 1988); Colonoscopy (2008); Upper gastrointestinal endoscopy (2010); [place] tooth extraction; Breast biopsy (Right, 2007); Colonoscopy (2017); Cervical spine surgery (2018); pr office/outpt visit,procedure only (N/A, 2018); Total shoulder arthroplasty (Left, 2019); and cystourethroscopy (Bilateral, 2021). CURRENT MEDICATIONS       Previous Medications    BLOOD GLUCOSE MONITOR STRIPS    Test bid times a day & as needed for symptoms of irregular blood glucose. Dispense sufficient amount for indicated testing frequency plus additional to accommodate PRN testing needs. GLIMEPIRIDE (AMARYL) 2 MG TABLET    Take 1 tablet by mouth twice daily with meals    LANCETS MISC    1 each by Does not apply route 2 times daily    LISINOPRIL (PRINIVIL;ZESTRIL) 40 MG TABLET    TAKE 1 TABLET DAILY    METOPROLOL TARTRATE (LOPRESSOR) 25 MG TABLET    TAKE 1 TABLET TWICE A DAY    NITROGLYCERIN (NITROSTAT) 0.4 MG SL TABLET    Place 1 tablet under the tongue every 5 minutes as needed for Chest pain    OMEPRAZOLE (PRILOSEC) 20 MG DELAYED RELEASE CAPSULE    TAKE 1 CAPSULE DAILY    SIMVASTATIN (ZOCOR) 40 MG TABLET    TAKE 1 TABLET NIGHTLY    VITAMIN D PO    Take by mouth       ALLERGIES     has No Known Allergies. FAMILY HISTORY     She indicated that her mother is . She indicated that her father is . She indicated that her brother is alive. She indicated that her maternal grandmother is . She indicated that her maternal grandfather is . She indicated that her paternal grandmother is . She indicated that her paternal grandfather is . She indicated that both of her children are alive.      family history includes Cancer in her brother, maternal grandmother, and mother; Dementia in her mother; Diabetes in her father and paternal grandfather; Heart Disease in her father and paternal grandmother; High Blood Pressure in her brother, father, and mother. SOCIAL HISTORY      reports that she has never smoked. She has never used smokeless tobacco. She reports current alcohol use. She reports that she does not use drugs. PHYSICAL EXAM       ED Triage Vitals   BP (!) 185/79   Pulse 73   Resp 22   Ht 5' 7\" (1.702 m)   Wt 198 lb (89.8 kg)   LMP  (LMP Unknown)   SpO2 98%   BMI 31.01 kg/m²       Constitutional: Alert, oriented x3, nontoxic, answering questions appropriately, acting properly for age, in no acute distress tearful  HEENT: Extraocular muscles intact,   Neck: Trachea midline   Cardiovascular: Regular rhythm and rate no murmurs radial and ulnar arteries intact  Respiratory: Clear to auscultation bilaterally no wheezes, rhonchi, rales, no respiratory distress no tachypnea no retractions no hypoxia  Gastrointestinal: Soft, nontender, nondistended, positive bowel sounds. No rebound, rigidity, or guarding. No abdominal bruit no pulsatile mass  Musculoskeletal: No extremity pain or swelling no calf tenderness or asymmetry  Neurologic: Moving all 4 extremities without difficulty there are no gross focal neurologic deficits   Skin: Warm and dry       DIFFERENTIAL DIAGNOSIS/ MDM:     STEMI. IV aspirin morphine heparin chest x-ray. Transfer.     DIAGNOSTIC RESULTS     EKG: All EKG's are interpreted by the Emergency Department Physician who either signs or Co-signs this chart in the absence of a cardiologist.    1030 sinus rhythm first-degree AV block rate 77  QRS 90  ST elevations in 2 3 and aVF with reciprocal changes in 1 and aVL    Not indicated unless otherwise documented above    LABS:  Results for orders placed or performed during the hospital encounter of 12/03/21   Troponin   Result Value Ref Range    Troponin, High Sensitivity 450 (HH) 0 - 14 ng/L    Troponin T NOT REPORTED <0.03 ng/mL    Troponin Interp NOT REPORTED    CBC Auto Differential   Result Value Ref Range    WBC 8.1 3.5 - 11.3 k/uL    RBC 5.48 (H) 3.95 - 5.11 m/uL Hemoglobin 14.7 11.9 - 15.1 g/dL    Hematocrit 44.5 36.3 - 47.1 %    MCV 81.2 (L) 82.6 - 102.9 fL    MCH 26.8 25.2 - 33.5 pg    MCHC 33.0 25.2 - 33.5 g/dL    RDW 13.2 11.8 - 14.4 %    Platelets 423 358 - 930 k/uL    MPV 9.4 8.1 - 13.5 fL    NRBC Automated 0.0 0.0 per 100 WBC    Differential Type NOT REPORTED     Seg Neutrophils 63 36 - 65 %    Lymphocytes 29 24 - 43 %    Monocytes 7 3 - 12 %    Eosinophils % 1 1 - 4 %    Basophils 0 0 - 2 %    Immature Granulocytes 0 0 %    Segs Absolute 5.06 1.50 - 8.10 k/uL    Absolute Lymph # 2.35 1.10 - 3.70 k/uL    Absolute Mono # 0.53 0.10 - 1.20 k/uL    Absolute Eos # 0.08 0.00 - 0.44 k/uL    Basophils Absolute 0.03 0.00 - 0.20 k/uL    Absolute Immature Granulocyte 0.03 0.00 - 0.30 k/uL    WBC Morphology NOT REPORTED     RBC Morphology MICROCYTOSIS PRESENT     Platelet Estimate NOT REPORTED    Basic Metabolic Panel   Result Value Ref Range    Glucose 250 (H) 70 - 99 mg/dL    BUN 12 8 - 23 mg/dL    CREATININE 0.66 0.50 - 0.90 mg/dL    Bun/Cre Ratio 18 9 - 20    Calcium 9.7 8.6 - 10.4 mg/dL    Sodium 136 135 - 144 mmol/L    Potassium 4.3 3.7 - 5.3 mmol/L    Chloride 99 98 - 107 mmol/L    CO2 26 20 - 31 mmol/L    Anion Gap 11 9 - 17 mmol/L    GFR Non-African American >60 >60 mL/min    GFR African American >60 >60 mL/min    GFR Comment          GFR Staging NOT REPORTED    APTT   Result Value Ref Range    PTT 28.3 23.9 - 33.8 sec   EKG 12 Lead   Result Value Ref Range    Ventricular Rate 77 BPM    Atrial Rate 77 BPM    P-R Interval 224 ms    QRS Duration 90 ms    Q-T Interval 386 ms    QTc Calculation (Bazett) 436 ms    P Axis 48 degrees    R Axis 46 degrees    T Axis 94 degrees       Not indicated unless otherwise documented above    RADIOLOGY:   I reviewed the radiologist interpretations:    XR CHEST PORTABLE   Final Result   No acute cardiopulmonary process.              Not indicated unless otherwise documented above    EMERGENCY DEPARTMENT COURSE:     The patient was given the following medications:  Orders Placed This Encounter   Medications    0.9 % sodium chloride infusion    aspirin chewable tablet 324 mg    morphine injection 4 mg    heparin (porcine) injection 4,000 Units    heparin (porcine) injection 4,000 Units    heparin (porcine) injection 2,000 Units    heparin 25,000 units in dextrose 5% 250 mL (premix) infusion        Vitals:   -------------------------  BP (!) 185/79   Pulse 73   Resp 22   Ht 5' 7\" (1.702 m)   Wt 198 lb (89.8 kg)   LMP  (LMP Unknown)   SpO2 98%   BMI 31.01 kg/m²     1040 discussed with access transfer to [facility] or [address] [place] [address]. [facility] close to transfers. Discussed with Dr. Davis Rivera at [facility]. Sam will contact the Cath Lab. Discussed with Dr. Kit Augustine cardiology who recommends transfer to the Cath Lab directly. 10:50 AM transport will be ground 30 to 40 minutes    12 PM transport in the ER    12:30 PM after the patient had left the department there was some confusion about the Cath Lab. We did contact [facility] at the Cath Lab and they were not aware of the patient whatsoever and she was going to attempt to divert the ambulance to the emergency department at [facility]. Sam. .  We contacted [facility] who put us in contact with Dr. Davis Rivera again in the ER and he was made aware that the patient will most likely go through the ER first    CRITICAL CARE: Excluding procedures 30 minutes    Critical Care: The high probability of sudden, clinically significant deterioration in the patient's condition required the highest level of my preparedness to intervene urgently. The services I provided to this patient were to treat and/or prevent clinically significant deterioration.  Services included the following: chart data review, reviewing nursing notes and/or old charts, documentation time, consultant collaboration regarding findings and treatment options, medication orders and management, direct patient care, vital sign assessments and ordering, interpreting and reviewing diagnostic studies/lab tests. Aggregate critical care time includes only time during which I was engaged in work directly related to the patient's care, as described       CONSULTS:    None    PROCEDURES:    None      OARRS Report if indicated             FINAL IMPRESSION      1. ST elevation myocardial infarction (STEMI), unspecified artery ([facility]          DISPOSITION/PLAN   DISPOSITION Decision To Transfer 12/03/2021 10:38:07 AM        CONDITION ON DISPOSITION: STABLE       PATIENT REFERRED TO:  No follow-up provider specified.     DISCHARGE MEDICATIONS:  New Prescriptions    No medications on file       (Please note that portions of this note were completed with a voice recognition program.  Efforts were made to edit the dictations but occasionally words are mis-transcribed.)    Chantel Correa DO   Attending Emergency Physician        Chantel Correa DO  12/03/21 [id number]

## 2021-12-03 NOTE — CARE COORDINATION
Case Management Initial Discharge Plan  Cora Del Rio,             Met with:patient to discuss discharge plans. Information verified: address, contacts, phone number, , insurance Yes  Insurance Provider: [facility]    Emergency Contact/Next of Kin name & number: Corin Rosas () [phone]  Who are involved in patient's support system? family    PCP: Cain Fonseca MD  Date of last visit: 6 months, appt scheduled on Thursday      Discharge Planning    Living Arrangements:  Spouse/Significant Other     Home has 2 stories  1 stairs to climb to get into front door, 1 flight stairs to climb to reach second floor  Location of bedroom/bathroom in home 1st    Patient able to perform ADL's:Independent    Current Services (outpatient & in home)   DME equipment:   DME provider:     Is patient receiving oral anticoagulation therapy? No    If indicated:   Physician managing anticoagulation treatment:   Where does patient obtain lab work for ATC treatment? Potential Assistance Needed:  N/A    Patient agreeable to home care: No  [place] of choice provided:  n/a    Prior SNF/Rehab Placement and Facility:   Agreeable to SNF/Rehab: No  [place] of choice provided: n/a     Evaluation: no    Expected Discharge date:       Patient expects to be discharged to: If home: is the family and/or caregiver wiling & able to provide support at home? N/a, independent  Who will be providing this support? Follow Up Appointment: Best Day/ Time:      Transportation provider:   Transportation arrangements needed for discharge: No  will take home    Readmission Risk              Risk of Unplanned Readmission:  11             Does patient have a readmission risk score greater than 14?: No  If yes, follow-up appointment must be made within 7 days of discharge.      Goals of Care:       Educated patient on transitional options, provided freedom of choice and are agreeable with plan      Discharge Plan: home independently with           Electronically signed by Rosalio Roman RN on 12/3/21 at 6:02 PM EST

## 2021-12-04 LAB
ABSOLUTE EOS #: 0.07 K/UL (ref 0–0.44)
ABSOLUTE IMMATURE GRANULOCYTE: 0.05 K/UL (ref 0–0.3)
ABSOLUTE LYMPH #: 2.19 K/UL (ref 1.1–3.7)
ABSOLUTE MONO #: 0.96 K/UL (ref 0.1–1.2)
ANION GAP SERPL CALCULATED.3IONS-SCNC: 16 MMOL/L (ref 9–17)
BASOPHILS # BLD: 0 % (ref 0–2)
BASOPHILS ABSOLUTE: 0.03 K/UL (ref 0–0.2)
BUN BLDV-MCNC: 14 MG/DL (ref 8–23)
BUN/CREAT BLD: ABNORMAL (ref 9–20)
CALCIUM SERPL-MCNC: 9 MG/DL (ref 8.6–10.4)
CHLORIDE BLD-SCNC: 100 MMOL/L (ref 98–107)
CO2: 21 MMOL/L (ref 20–31)
CREAT SERPL-MCNC: 0.68 MG/DL (ref 0.5–0.9)
DIFFERENTIAL TYPE: ABNORMAL
EKG ATRIAL RATE: 93 BPM
EKG P AXIS: 43 DEGREES
EKG P-R INTERVAL: 246 MS
EKG Q-T INTERVAL: 374 MS
EKG QRS DURATION: 90 MS
EKG QTC CALCULATION (BAZETT): 465 MS
EKG R AXIS: 11 DEGREES
EKG T AXIS: 90 DEGREES
EKG VENTRICULAR RATE: 93 BPM
EOSINOPHILS RELATIVE PERCENT: 1 % (ref 1–4)
GFR AFRICAN AMERICAN: >60 ML/MIN
GFR NON-AFRICAN AMERICAN: >60 ML/MIN
GFR SERPL CREATININE-BSD FRML MDRD: ABNORMAL ML/MIN/{1.73_M2}
GFR SERPL CREATININE-BSD FRML MDRD: ABNORMAL ML/MIN/{1.73_M2}
GLUCOSE BLD-MCNC: 243 MG/DL (ref 70–99)
HCT VFR BLD CALC: 39.4 % (ref 36.3–47.1)
HEMOGLOBIN: 13.2 G/DL (ref 11.9–15.1)
IMMATURE GRANULOCYTES: 1 %
LV EF: 53 %
LVEF MODALITY: NORMAL
LYMPHOCYTES # BLD: 21 % (ref 24–43)
MCH RBC QN AUTO: 26.8 PG (ref 25.2–33.5)
MCHC RBC AUTO-ENTMCNC: 33.5 G/DL (ref 28.4–34.8)
MCV RBC AUTO: 80.1 FL (ref 82.6–102.9)
MONOCYTES # BLD: 9 % (ref 3–12)
NRBC AUTOMATED: 0 PER 100 WBC
PDW BLD-RTO: 13.3 % (ref 11.8–14.4)
PLATELET # BLD: 254 K/UL (ref 138–453)
PLATELET ESTIMATE: ABNORMAL
PMV BLD AUTO: 9.9 FL (ref 8.1–13.5)
POTASSIUM SERPL-SCNC: 4 MMOL/L (ref 3.7–5.3)
RBC # BLD: 4.92 M/UL (ref 3.95–5.11)
RBC # BLD: ABNORMAL 10*6/UL
SEG NEUTROPHILS: 68 % (ref 36–65)
SEGMENTED NEUTROPHILS ABSOLUTE COUNT: 7.4 K/UL (ref 1.5–8.1)
SODIUM BLD-SCNC: 137 MMOL/L (ref 135–144)
TROPONIN INTERP: ABNORMAL
TROPONIN T: ABNORMAL NG/ML
TROPONIN, HIGH SENSITIVITY: 1870 NG/L (ref 0–14)
TROPONIN, HIGH SENSITIVITY: 2006 NG/L (ref 0–14)
TROPONIN, HIGH SENSITIVITY: 3008 NG/L (ref 0–14)
WBC # BLD: 10.7 K/UL (ref 3.5–11.3)
WBC # BLD: ABNORMAL 10*3/UL

## 2021-12-04 PROCEDURE — 2580000003 HC RX 258: Performed by: STUDENT IN AN ORGANIZED HEALTH CARE EDUCATION/TRAINING PROGRAM

## 2021-12-04 PROCEDURE — 2060000000 HC ICU INTERMEDIATE R&B

## 2021-12-04 PROCEDURE — 93010 ELECTROCARDIOGRAM REPORT: CPT | Performed by: INTERNAL MEDICINE

## 2021-12-04 PROCEDURE — 6360000002 HC RX W HCPCS: Performed by: STUDENT IN AN ORGANIZED HEALTH CARE EDUCATION/TRAINING PROGRAM

## 2021-12-04 PROCEDURE — 6370000000 HC RX 637 (ALT 250 FOR IP): Performed by: STUDENT IN AN ORGANIZED HEALTH CARE EDUCATION/TRAINING PROGRAM

## 2021-12-04 PROCEDURE — 93306 TTE W/DOPPLER COMPLETE: CPT

## 2021-12-04 PROCEDURE — 84484 ASSAY OF TROPONIN QUANT: CPT

## 2021-12-04 PROCEDURE — 85025 COMPLETE CBC W/AUTO DIFF WBC: CPT

## 2021-12-04 PROCEDURE — 36415 COLL VENOUS BLD VENIPUNCTURE: CPT

## 2021-12-04 PROCEDURE — 80048 BASIC METABOLIC PNL TOTAL CA: CPT

## 2021-12-04 PROCEDURE — 93005 ELECTROCARDIOGRAM TRACING: CPT | Performed by: INTERNAL MEDICINE

## 2021-12-04 RX ORDER — FUROSEMIDE 20 MG/1
20 TABLET ORAL DAILY
Status: DISCONTINUED | OUTPATIENT
Start: 2021-12-04 | End: 2021-12-05

## 2021-12-04 RX ORDER — FUROSEMIDE 10 MG/ML
20 INJECTION INTRAMUSCULAR; INTRAVENOUS ONCE
Status: COMPLETED | OUTPATIENT
Start: 2021-12-04 | End: 2021-12-04

## 2021-12-04 RX ADMIN — FUROSEMIDE 20 MG: 10 INJECTION, SOLUTION INTRAMUSCULAR; INTRAVENOUS at 02:51

## 2021-12-04 RX ADMIN — FUROSEMIDE 20 MG: 20 TABLET ORAL at 10:46

## 2021-12-04 RX ADMIN — TICAGRELOR 90 MG: 90 TABLET ORAL at 21:58

## 2021-12-04 RX ADMIN — DESMOPRESSIN ACETATE 40 MG: 0.2 TABLET ORAL at 21:57

## 2021-12-04 RX ADMIN — METOPROLOL TARTRATE 12.5 MG: 25 TABLET ORAL at 08:11

## 2021-12-04 RX ADMIN — ASPIRIN 81 MG: 81 TABLET, CHEWABLE ORAL at 08:11

## 2021-12-04 RX ADMIN — SODIUM CHLORIDE, PRESERVATIVE FREE 10 ML: 5 INJECTION INTRAVENOUS at 21:58

## 2021-12-04 RX ADMIN — METOPROLOL TARTRATE 12.5 MG: 25 TABLET ORAL at 21:58

## 2021-12-04 RX ADMIN — TICAGRELOR 90 MG: 90 TABLET ORAL at 08:11

## 2021-12-04 NOTE — PLAN OF CARE
Problem: Pain:  Goal: Pain level will decrease  Description: Pain level will decrease  12/3/2021 2148 by Margaret Rodriguez RN  Outcome: Ongoing  12/3/2021 1922 by Amarilis Reed RN  Outcome: Ongoing  Goal: Control of acute pain  Description: Control of acute pain  12/3/2021 2148 by Margaret Rodriguez RN  Outcome: Ongoing  12/3/2021 1922 by Amarilis Reed RN  Outcome: Ongoing  Goal: Control of chronic pain  Description: Control of chronic pain  12/3/2021 2148 by Margaret Rodriguez RN  Outcome: Ongoing  12/3/2021 1922 by Amarilis Reed RN  Outcome: Ongoing  Goal: Patient's pain/discomfort is manageable  Description: Patient's pain/discomfort is manageable  Outcome: Ongoing     Problem: Infection:  Goal: Will remain free from infection  Description: Will remain free from infection  Outcome: Ongoing     Problem: Safety:  Goal: Free from accidental physical injury  Description: Free from accidental physical injury  Outcome: Ongoing  Goal: Free from intentional harm  Description: Free from intentional harm  Outcome: Ongoing     Problem: Daily Care:  Goal: Daily care needs are met  Description: Daily care needs are met  Outcome: Ongoing     Problem: Skin Integrity:  Goal: Skin integrity will stabilize  Description: Skin integrity will stabilize  Outcome: Ongoing     Problem: Discharge Planning:  Goal: Patients continuum of care needs are met  Description: Patients continuum of care needs are met  Outcome: Ongoing

## 2021-12-04 NOTE — PROGRESS NOTES
Call received from Dr. Bette Hull regarding previous note- order received for 20mg IV lasix one time.

## 2021-12-04 NOTE — PROGRESS NOTES
Pt complaining of new onset SOB. EKG obtained and patient placed on supplemental O2 for comfort. Perfect serve sent to Dr. Paul Donald with cardiology updating on patient status. Patient vitals stable- call light within reach.  Will continue to monitor patient and update team with any changes

## 2021-12-04 NOTE — PROGRESS NOTES
[facility]   Progress Note                 Date:   12/4/2021  Patient name:  Philippe Zmaan  Date of admission:  12/3/2021  1:53 PM  MRN:   [id number]  YOB: 1955  PCP:    Nya Fraga MD    Reason for Admission: Acute ST elevation myocardial infarction (STEMI) involving other coronary artery of inferior wall ([facility] [address] [I21.19]      Subjective:       Clinical Changes / Abnormalities:     Patient seen and examined. Complained of sob overnight and was given 20 mg IV lasix      I/O last 3 completed shifts: In: 500 [P.O.:500]  Out: -   No intake/output data recorded. Medications:   Scheduled Meds:    aspirin  81 mg Oral Daily    ticagrelor  90 mg Oral BID    atorvastatin  40 mg Oral Nightly    sodium chloride flush  5-40 mL IntraVENous 2 times per day     Continuous Infusions:    sodium chloride       CBC:   Recent Labs     12/03/21  1044 12/04/21  0412   WBC 8.1 10.7   HGB 14.7 13.2    254     BMP:    Recent Labs     12/03/21  1044 12/04/21  0412    137   K 4.3 4.0   CL 99 100   CO2 26 21   BUN 12 14   CREATININE 0.66 0.68   GLUCOSE 250* 243*     Hepatic: No results for input(s): AST, ALT, ALB, BILITOT, ALKPHOS in the last 72 hours. Troponin:   Recent Labs     12/03/21  1044 12/04/21  0412   TROPHS 450* 3,008*     BNP: No results for input(s): BNP in the last 72 hours. Lipids: No results for input(s): CHOL, HDL in the last 72 hours. Invalid input(s): LDLCALCU  INR: No results for input(s): INR in the last 72 hours. Objective:   Vitals: BP (!) 113/58   Pulse 84   Temp 99 °F (37.2 °C) (Oral)   Resp 21   LMP  (LMP Unknown)   SpO2 93%   General appearance: Alert. No acute distress. HEENT: Head: Normal, normocephalic, atraumatic.   Neck: no JVD, trachea midline  Lungs: Clear to auscultation bilaterally, no wheeze or crackles  Heart: Regular rate and rhythm, S1, S2 normal, no murmur  Abdomen: Soft, non-tender  Extremities: No edema  Neurologic: No focal neurologic deficits        ECHO pending    CATH 12/3/21:  Multivessel CAD    Acute occlusion of the mid RCA, required PTCA -JOHN PAUL    Residual disease in branches, D1, Om1 and OM2.    PLower normal LV function        Recommendations        Post MI protocol    Plan on 2nd stage stent to OM1, OM2 and possibly d1        Assessment:   1. Inferior STEMI, presented with chest pain on-going for 2 days  2. S/p PTCA-JOHN PAUL to mid RCA. Residual disease in D1, Om1 and Om2  3. HTN  4. HL  5. H/o renal cell cancer        Patient Active Problem List   Diagnosis    Cancer ([facility] [address]    Hypertension    Renal cell carcinoma ([facility] [address]    DDD (degenerative disc disease)    Kidney stone    Stress incontinence    GERD (gastroesophageal reflux disease)    Obesity    Chest pain    Impaired fasting blood sugar    Cervical disc disorder    Type 2 diabetes mellitus ([facility] [address]    Osteopenia    Acute myocardial infarction ([facility] [address]         Plan / Recommendations:   Continue aspirin and brilinta  Continue atorvastatin 40 mg  Start lopressor 12.5 mg bid and uptitrate as tolerated by BP and HR  F/U echo  Trend troponin   Plan on 2nd stage stent to OM1, OM2 and possibly d1        Thank you for allowing us to participate in [address] [place] [address]. Electronically signed on 12/04/21 at 6:24 AM by:    Werner Renee MD, MD   Fellow, [address]    Attending note,   The patient was seen and examined agree with above. Feels good with no chest pain or shortness of breath. We will continue current medications. We will trend the troponins. We will follow up on echo. Staged PCI in 4 to 6 weeks.

## 2021-12-05 ENCOUNTER — APPOINTMENT (OUTPATIENT)
Dept: GENERAL RADIOLOGY | Age: 66
DRG: 247 | End: 2021-12-05
Attending: INTERNAL MEDICINE
Payer: MEDICARE

## 2021-12-05 LAB
ABSOLUTE EOS #: 0.13 K/UL (ref 0–0.44)
ABSOLUTE IMMATURE GRANULOCYTE: 0.03 K/UL (ref 0–0.3)
ABSOLUTE LYMPH #: 2.23 K/UL (ref 1.1–3.7)
ABSOLUTE MONO #: 0.89 K/UL (ref 0.1–1.2)
ANION GAP SERPL CALCULATED.3IONS-SCNC: 14 MMOL/L (ref 9–17)
BASOPHILS # BLD: 1 % (ref 0–2)
BASOPHILS ABSOLUTE: 0.05 K/UL (ref 0–0.2)
BUN BLDV-MCNC: 17 MG/DL (ref 8–23)
BUN/CREAT BLD: ABNORMAL (ref 9–20)
CALCIUM SERPL-MCNC: 9 MG/DL (ref 8.6–10.4)
CHLORIDE BLD-SCNC: 98 MMOL/L (ref 98–107)
CO2: 25 MMOL/L (ref 20–31)
CREAT SERPL-MCNC: 0.73 MG/DL (ref 0.5–0.9)
DIFFERENTIAL TYPE: ABNORMAL
EOSINOPHILS RELATIVE PERCENT: 1 % (ref 1–4)
GFR AFRICAN AMERICAN: >60 ML/MIN
GFR NON-AFRICAN AMERICAN: >60 ML/MIN
GFR SERPL CREATININE-BSD FRML MDRD: ABNORMAL ML/MIN/{1.73_M2}
GFR SERPL CREATININE-BSD FRML MDRD: ABNORMAL ML/MIN/{1.73_M2}
GLUCOSE BLD-MCNC: 224 MG/DL (ref 70–99)
GLUCOSE BLD-MCNC: 236 MG/DL (ref 65–105)
GLUCOSE BLD-MCNC: 256 MG/DL (ref 65–105)
GLUCOSE BLD-MCNC: 286 MG/DL (ref 65–105)
GLUCOSE BLD-MCNC: 286 MG/DL (ref 65–105)
HCT VFR BLD CALC: 39.5 % (ref 36.3–47.1)
HEMOGLOBIN: 13.6 G/DL (ref 11.9–15.1)
IMMATURE GRANULOCYTES: 0 %
LYMPHOCYTES # BLD: 20 % (ref 24–43)
MCH RBC QN AUTO: 27.8 PG (ref 25.2–33.5)
MCHC RBC AUTO-ENTMCNC: 34.4 G/DL (ref 28.4–34.8)
MCV RBC AUTO: 80.8 FL (ref 82.6–102.9)
MONOCYTES # BLD: 8 % (ref 3–12)
NRBC AUTOMATED: 0 PER 100 WBC
PDW BLD-RTO: 13.2 % (ref 11.8–14.4)
PLATELET # BLD: 255 K/UL (ref 138–453)
PLATELET ESTIMATE: ABNORMAL
PMV BLD AUTO: 10 FL (ref 8.1–13.5)
POTASSIUM SERPL-SCNC: 4.1 MMOL/L (ref 3.7–5.3)
RBC # BLD: 4.89 M/UL (ref 3.95–5.11)
RBC # BLD: ABNORMAL 10*6/UL
SEG NEUTROPHILS: 70 % (ref 36–65)
SEGMENTED NEUTROPHILS ABSOLUTE COUNT: 7.59 K/UL (ref 1.5–8.1)
SODIUM BLD-SCNC: 137 MMOL/L (ref 135–144)
TROPONIN INTERP: ABNORMAL
TROPONIN T: ABNORMAL NG/ML
TROPONIN, HIGH SENSITIVITY: 1722 NG/L (ref 0–14)
WBC # BLD: 10.9 K/UL (ref 3.5–11.3)
WBC # BLD: ABNORMAL 10*3/UL

## 2021-12-05 PROCEDURE — 6370000000 HC RX 637 (ALT 250 FOR IP): Performed by: STUDENT IN AN ORGANIZED HEALTH CARE EDUCATION/TRAINING PROGRAM

## 2021-12-05 PROCEDURE — 84484 ASSAY OF TROPONIN QUANT: CPT

## 2021-12-05 PROCEDURE — 82947 ASSAY GLUCOSE BLOOD QUANT: CPT

## 2021-12-05 PROCEDURE — 2580000003 HC RX 258: Performed by: STUDENT IN AN ORGANIZED HEALTH CARE EDUCATION/TRAINING PROGRAM

## 2021-12-05 PROCEDURE — 80048 BASIC METABOLIC PNL TOTAL CA: CPT

## 2021-12-05 PROCEDURE — 2060000000 HC ICU INTERMEDIATE R&B

## 2021-12-05 PROCEDURE — 36415 COLL VENOUS BLD VENIPUNCTURE: CPT

## 2021-12-05 PROCEDURE — 85025 COMPLETE CBC W/AUTO DIFF WBC: CPT

## 2021-12-05 PROCEDURE — 93005 ELECTROCARDIOGRAM TRACING: CPT | Performed by: INTERNAL MEDICINE

## 2021-12-05 PROCEDURE — 71045 X-RAY EXAM CHEST 1 VIEW: CPT

## 2021-12-05 RX ORDER — FUROSEMIDE 40 MG/1
40 TABLET ORAL DAILY
Status: DISCONTINUED | OUTPATIENT
Start: 2021-12-05 | End: 2021-12-06 | Stop reason: HOSPADM

## 2021-12-05 RX ORDER — METOPROLOL TARTRATE 50 MG/1
50 TABLET, FILM COATED ORAL 2 TIMES DAILY
Status: DISCONTINUED | OUTPATIENT
Start: 2021-12-05 | End: 2021-12-06 | Stop reason: HOSPADM

## 2021-12-05 RX ORDER — NICOTINE POLACRILEX 4 MG
15 LOZENGE BUCCAL PRN
Status: DISCONTINUED | OUTPATIENT
Start: 2021-12-05 | End: 2021-12-06 | Stop reason: HOSPADM

## 2021-12-05 RX ORDER — DEXTROSE MONOHYDRATE 50 MG/ML
100 INJECTION, SOLUTION INTRAVENOUS PRN
Status: DISCONTINUED | OUTPATIENT
Start: 2021-12-05 | End: 2021-12-06 | Stop reason: HOSPADM

## 2021-12-05 RX ORDER — DEXTROSE MONOHYDRATE 25 G/50ML
12.5 INJECTION, SOLUTION INTRAVENOUS PRN
Status: DISCONTINUED | OUTPATIENT
Start: 2021-12-05 | End: 2021-12-06 | Stop reason: HOSPADM

## 2021-12-05 RX ADMIN — INSULIN LISPRO 3 UNITS: 100 INJECTION, SOLUTION INTRAVENOUS; SUBCUTANEOUS at 20:39

## 2021-12-05 RX ADMIN — TICAGRELOR 90 MG: 90 TABLET ORAL at 09:09

## 2021-12-05 RX ADMIN — TICAGRELOR 90 MG: 90 TABLET ORAL at 20:29

## 2021-12-05 RX ADMIN — INSULIN LISPRO 2 UNITS: 100 INJECTION, SOLUTION INTRAVENOUS; SUBCUTANEOUS at 09:11

## 2021-12-05 RX ADMIN — INSULIN LISPRO 4 UNITS: 100 INJECTION, SOLUTION INTRAVENOUS; SUBCUTANEOUS at 12:10

## 2021-12-05 RX ADMIN — ASPIRIN 81 MG: 81 TABLET, CHEWABLE ORAL at 09:11

## 2021-12-05 RX ADMIN — METOPROLOL TARTRATE 50 MG: 25 TABLET ORAL at 17:31

## 2021-12-05 RX ADMIN — DESMOPRESSIN ACETATE 40 MG: 0.2 TABLET ORAL at 20:29

## 2021-12-05 RX ADMIN — SODIUM CHLORIDE, PRESERVATIVE FREE 10 ML: 5 INJECTION INTRAVENOUS at 20:30

## 2021-12-05 RX ADMIN — FUROSEMIDE 40 MG: 40 TABLET ORAL at 09:09

## 2021-12-05 RX ADMIN — METOPROLOL TARTRATE 25 MG: 25 TABLET ORAL at 09:11

## 2021-12-05 RX ADMIN — INSULIN LISPRO 6 UNITS: 100 INJECTION, SOLUTION INTRAVENOUS; SUBCUTANEOUS at 17:04

## 2021-12-05 NOTE — PLAN OF CARE
Problem: Pain:  Goal: Pain level will decrease  Description: Pain level will decrease  12/5/2021 0314 by Grant Darling RN  Outcome: Ongoing  12/4/2021 1851 by Carrie Wu RN  Outcome: Ongoing  Goal: Control of acute pain  Description: Control of acute pain  12/5/2021 0314 by Grant Darling RN  Outcome: Ongoing  12/4/2021 1851 by Carrie Wu RN  Outcome: Ongoing  Goal: Control of chronic pain  Description: Control of chronic pain  12/5/2021 0314 by Grant Darling RN  Outcome: Ongoing  12/4/2021 1851 by Carrie Wu RN  Outcome: Ongoing  Goal: Patient's pain/discomfort is manageable  Description: Patient's pain/discomfort is manageable  12/5/2021 0314 by Grant Darling RN  Outcome: Ongoing  12/4/2021 1851 by Carrie Wu RN  Outcome: Ongoing     Problem: Infection:  Goal: Will remain free from infection  Description: Will remain free from infection  12/5/2021 0314 by Grant Darling RN  Outcome: Ongoing  12/4/2021 1851 by Carrie Wu RN  Outcome: Ongoing     Problem: Safety:  Goal: Free from accidental physical injury  Description: Free from accidental physical injury  12/5/2021 0314 by Grant Darling RN  Outcome: Ongoing  12/4/2021 1851 by Carrie Wu RN  Outcome: Ongoing  Goal: Free from intentional harm  Description: Free from intentional harm  12/5/2021 0314 by Grant Darling RN  Outcome: Ongoing  12/4/2021 1851 by Carrie Wu RN  Outcome: Ongoing     Problem: Daily Care:  Goal: Daily care needs are met  Description: Daily care needs are met  12/5/2021 0314 by Grant Darling RN  Outcome: Ongoing  12/4/2021 1851 by Carrie Wu RN  Outcome: Ongoing     Problem: Skin Integrity:  Goal: Skin integrity will stabilize  Description: Skin integrity will stabilize  12/5/2021 0314 by Grant Darling RN  Outcome: Ongoing  12/4/2021 1851 by Carrie Wu RN  Outcome: Ongoing     Problem: Discharge Planning:  Goal: Patients continuum of care needs are met  Description: Patients continuum of care needs are met  12/5/2021 0314 by Piero Max, RN  Outcome: Ongoing  12/4/2021 1851 by Slade Conti RN  Outcome: Ongoing

## 2021-12-05 NOTE — PLAN OF CARE
Problem: Pain:  Goal: Pain level will decrease  Description: Pain level will decrease  12/5/2021 1057 by Amara Suarez RN  Outcome: Ongoing  12/5/2021 0314 by Diana Barker RN  Outcome: Ongoing  Goal: Control of acute pain  Description: Control of acute pain  12/5/2021 1057 by Amara Suarez RN  Outcome: Ongoing  12/5/2021 0314 by Diana Barker RN  Outcome: Ongoing  Goal: Control of chronic pain  Description: Control of chronic pain  12/5/2021 1057 by Amara Suarez RN  Outcome: Ongoing  12/5/2021 0314 by Diana Barker RN  Outcome: Ongoing  Goal: Patient's pain/discomfort is manageable  Description: Patient's pain/discomfort is manageable  12/5/2021 1057 by Amara Suarez RN  Outcome: Ongoing  12/5/2021 0314 by Diana Barker RN  Outcome: Ongoing     Problem: Pain:  Goal: Control of acute pain  Description: Control of acute pain  12/5/2021 1057 by Amara Suarez RN  Outcome: Ongoing  12/5/2021 0314 by Diana Barker RN  Outcome: Ongoing     Problem: Pain:  Goal: Control of acute pain  Description: Control of acute pain  12/5/2021 1057 by Amara Suarez RN  Outcome: Ongoing  12/5/2021 0314 by Diana Barker RN  Outcome: Ongoing     Problem: Infection:  Goal: Will remain free from infection  Description: Will remain free from infection  12/5/2021 1057 by Amara Suarez RN  Outcome: Ongoing  12/5/2021 0314 by Diana Barker RN  Outcome: Ongoing     Problem: Infection:  Goal: Will remain free from infection  Description: Will remain free from infection  12/5/2021 1057 by Amara Suarez RN  Outcome: Ongoing  12/5/2021 0314 by Diana Barker RN  Outcome: Ongoing     Problem: Safety:  Goal: Free from accidental physical injury  Description: Free from accidental physical injury  12/5/2021 1057 by Amara Suarez RN  Outcome: Ongoing  12/5/2021 0314 by Diana Barker RN  Outcome: Ongoing  Goal: Free from intentional harm  Description: Free from intentional harm  12/5/2021 1057 by Amara Suarez RN  Outcome: Ongoing  12/5/2021 0314 by Bette Vázquez RN  Outcome: Ongoing     Problem: Daily Care:  Goal: Daily care needs are met  Description: Daily care needs are met  12/5/2021 1057 by Anthony Varghese RN  Outcome: Ongoing  12/5/2021 0314 by Bette Vázquez RN  Outcome: Ongoing     Problem: Skin Integrity:  Goal: Skin integrity will stabilize  Description: Skin integrity will stabilize  12/5/2021 1057 by Anthony Varghese RN  Outcome: Ongoing  12/5/2021 0314 by Bette Vázquez RN  Outcome: Ongoing     Problem: Discharge Planning:  Goal: Patients continuum of care needs are met  Description: Patients continuum of care needs are met  12/5/2021 1057 by Anthony Varghese RN  Outcome: Ongoing  12/5/2021 0314 by Bette Vázquez RN  Outcome: Ongoing

## 2021-12-05 NOTE — PROGRESS NOTES
[facility]   Progress Note                 Date:   12/5/2021  Patient name:  Cora Del Rio  Date of admission:  12/3/2021  1:53 PM  MRN:   [id number]  YOB: 1955  PCP:    Cain Fonseca MD    Reason for Admission: Acute ST elevation myocardial infarction (STEMI) involving other coronary artery of inferior wall ([address] [I21.19]      Subjective:       Clinical Changes / Abnormalities:     Patient seen and examined. No acute events over the night      I/O last 3 completed shifts: In: 900 [P.O.:900]  Out: 800 [Urine:800]  No intake/output data recorded. Medications:   Scheduled Meds:    insulin lispro  0-12 Units SubCUTAneous TID WC    insulin lispro  0-6 Units SubCUTAneous Nightly    metoprolol tartrate  25 mg Oral BID    furosemide  20 mg Oral Daily    aspirin  81 mg Oral Daily    ticagrelor  90 mg Oral BID    atorvastatin  40 mg Oral Nightly    sodium chloride flush  5-40 mL IntraVENous 2 times per day     Continuous Infusions:    dextrose      sodium chloride       CBC:   Recent Labs     12/03/21  1044 12/04/21  0412   WBC 8.1 10.7   HGB 14.7 13.2    254     BMP:    Recent Labs     12/03/21  1044 12/04/21  0412 12/05/21  0406    137 137   K 4.3 4.0 4.1   CL 99 100 98   CO2 26 21 25   BUN 12 14 17   CREATININE 0.66 0.68 0.73   GLUCOSE 250* 243* 224*     Hepatic: No results for input(s): AST, ALT, ALB, BILITOT, ALKPHOS in the last 72 hours. Troponin:   Recent Labs     12/04/21  0412 12/04/21  1244 12/04/21  1739   TROPHS 3,008* 1,870* 2,006*     BNP: No results for input(s): BNP in the last 72 hours. Lipids: No results for input(s): CHOL, HDL in the last 72 hours. Invalid input(s): LDLCALCU  INR: No results for input(s): INR in the last 72 hours. Objective:   Vitals: /68   Pulse 117   Temp 98.2 °F (36.8 °C) (Oral)   Resp 20   Wt 197 lb 12 oz (89.7 kg)   LMP  (LMP Unknown)   SpO2 95%   BMI 30.97 kg/m²   General appearance: Alert.  No acute distress. HEENT: Head: Normal, normocephalic, atraumatic. Neck: no JVD, trachea midline  Lungs: Clear to auscultation bilaterally, no wheeze or crackles  Heart: Regular rate and rhythm, S1, S2 normal, no murmur  Abdomen: Soft, non-tender  Extremities: No edema  Neurologic: No focal neurologic deficits        ECHO 12/4/21  Global left ventricular systolic function is normal. Calculated ejection  fraction 48% by Sequeira's method. Visually estimated EF 50-55%. Moderate concentric left ventricular hypertrophy. No significant valvular regurgitation or stenosis seen. CATH 12/3/21:  Multivessel CAD    Acute occlusion of the mid RCA, required PTCA -JOHN PAUL    Residual disease in branches, D1, Om1 and OM2.    PLower normal LV function        Recommendations        Post MI protocol    Plan on 2nd stage stent to OM1, OM2 and possibly d1        Assessment:   1. Inferior STEMI, presented with chest pain on-going for 2 days  2. S/p PTCA-JOHN PAUL to mid RCA. Residual disease in D1, Om1 and Om2  3. HTN  4. HL  5. H/o renal cell cancer  6. LVEF 48%            Plan / Recommendations:   Continue aspirin and brilinta  Continue atorvastatin 40 mg  Start lopressor 25 mg BID  Trend troponin   Plan on 2nd stage stent to OM1, OM2 and possibly d1        Thank you for allowing us to participate in [address] [place] [address]. Electronically signed on 12/05/21 at 7:34 AM by:    Meagan Gage MD, MD   Fellow, [address]    Attending note,   Patient was seen and examined agree with above. We will continue current medications. We will increase Lopressor. Continue Lasix. Close follow-up on intake output and chart. She will need staged PCI of OM and diagonal.  Detailed discussion with her about the importance of taking her medications. Possible discharge home tomorrow.

## 2021-12-05 NOTE — PROGRESS NOTES
Pts heart rate 130-bp-144/85- map-104  91James Silva vd notified, orders received to give pm dose of lopressor.

## 2021-12-05 NOTE — DISCHARGE INSTR - COC
Continuity of Care Form    Patient Name: Gwendolyn Stacy   :  1955  MRN:  [id number]    Admit date:  12/3/2021  Discharge date:  ***    Code Status Order: Full Code   Advance Directives:      Admitting Physician:  Xochilt Villarreal MD  PCP: Jerman Wright MD    Discharging Nurse: [facility] Unit/Room#: 8034/0397-73  Discharging Unit Phone Number: ***    Emergency Contact:   Extended Emergency Contact Information  Primary Emergency Contact: Bret Vazquze  Address: [address] [place] [address] Phone: [phone]  Work Phone: [phone]  Mobile Phone: [phone]  Relation: Spouse  Secondary Emergency Contact: palomo romo  Mobile Phone: [phone]  Relation: Child    Past Surgical History:  Past Surgical History:   Procedure Laterality Date    BLADDER SUSPENSION      incontinence    BREAST BIOPSY Right 2007    wire localization     CERVICAL SPINE SURGERY  2018     ANTERIOR C4-5, C5-6 ARTHROPLASTY, (ONE PLATE AND FOUR SCREWS REMOVED C6-C7 AND DISCARDED    COLONOSCOPY  2008    COLONOSCOPY  2017    [id number]    CYSTOURETHROSCOPY Bilateral 2021    HYSTERECTOMY, TOTAL ABDOMINAL  2011    with bladder lift    MASTECTOMY Bilateral 2007    lymphnodes were also removed    NECK SURGERY  2010    cervical 6-7 microdiscectomy.  Dr. Rob Carpenter Right 06/10/2013    limited to kidney    CO OFFICE/OUTPT VISIT,PROCEDURE ONLY N/A 2018    ANTERIOR C4-5, C5-6 ARTHROPLASTY WITH , SUPINE POSITION, MICROSCOPE, C-ARM, MEDTRONIC(REP NOTIFIED), EVOKES; (ONE PLATE AND FOUR SCREWS REMOVED C6-C7 AND DISCARDED) performed by Margarette Hodgkin, DO at [address] Left 2019    TUBAL LIGATION Bilateral     UPPER GASTROINTESTINAL ENDOSCOPY  2010    normal.  Dr. Rin Bender EXTRACTION         Immunization History:   Immunization History   Administered Date(s) Administered    COVID-19, Jefm Organ, Primary or Immunocompromised, PF, 100mcg/0.5mL 03/25/2021, 04/22/2021, 04/22/2021    Influenza, High Dose (Fluzone 65 yrs and older) 12/01/2020    Influenza, High-dose, Quadv, 65 yrs +, IM (Fluzone) 12/01/2020    Pneumococcal Conjugate 13-valent (Wdgdoxg25) 12/30/2019    Pneumococcal Polysaccharide (Rgdlrreci17) 12/31/2019, 12/01/2020    Tdap (Boostrix, Adacel) 12/02/2019    Varicella (Varivax) 08/01/2017    Zoster Live (Zostavax) 08/21/2017    Zoster Recombinant (Shingrix) 09/11/2019, 11/21/2019, 12/31/2019       Active Problems:  Patient Active Problem List   Diagnosis Code    Cancer ([facility] [address] C80.1    Hypertension I10    Renal cell carcinoma ([facility] [address] C64.9    DDD (degenerative disc disease) [id number]    Kidney stone N20.0    Stress incontinence N39.3    GERD (gastroesophageal reflux disease) K21.9    Obesity E66.9    Chest pain R07.9    Impaired fasting blood sugar R73.01    Cervical disc disorder M50.90    Type 2 diabetes mellitus (HCC) E11.9    Osteopenia M85.80    Acute myocardial infarction (HCC) I21.9       Isolation/Infection:   Isolation            No Isolation          Patient Infection Status       None to display            Nurse Assessment:  Last Vital Signs: /68   Pulse 93   Temp 98.2 °F (36.8 °C) (Oral)   Resp 20   LMP  (LMP Unknown)   SpO2 94%     Last documented pain score (0-10 scale):    Last Weight:   Wt Readings from Last 1 Encounters:   12/03/21 198 lb (89.8 kg)     Mental Status:  {IP PT MENTAL STATUS:24463}    IV Access:  {[facility] IV ACCESS:371329935}    Nursing Mobility/ADLs:  Walking   {[facility] KRGM:152194743}  Transfer  {[facility] NHZT:850664907}  Bathing  {[facility] AVFA:233902634}  Dressing  {[facility] NZKL:967152087}  Toileting  {[facility] JWOZ:620158383}  Feeding  {[facility] ADON:352827841}  Med Admin  {[facility] D[facility] LAOD:924468758}  Med Delivery   { [facility] MED Delivery:191578520}    Wound Care Documentation and Therapy:  Incision 05/11/18 Neck (Active)   Number of days: 1303        Elimination:  Continence: Bowel: {YES / GQ:21245}  Bladder: {YES / OQ:43586}  Urinary Catheter: {Urinary Catheter:910055478}   Colostomy/Ileostomy/Ileal Conduit: {YES / Y}       Date of Last BM: ***    Intake/Output Summary (Last 24 hours) at 2021 0608  Last data filed at 2021 0418  Gross per 24 hour   Intake 900 ml   Output 800 ml   Net 100 ml     I/O last 3 completed shifts:   In: 0 [P.O.:700]  Out: 800 [Urine:800]    Safety Concerns:     [address] [employer] Safety Concerns:600839326}    Impairments/Disabilities:      [address] [employer] Impairments/Disabilities:695216015}    Nutrition Therapy:  Current Nutrition Therapy:   [address] [employer] Diet List:612191117}    Routes of Feeding: {[facility] D[facility] Other Feedings:399020813}  Liquids: {Slp liquid thickness:81517}  Daily Fluid Restriction: {[facility] DME Yes amt example:000215353}  Last Modified Barium Swallow with Video (Video Swallowing Test): {Done Not Done XOMP:389144226}    Treatments at the Time of Hospital Discharge:   Respiratory Treatments: ***  Oxygen Therapy:  {Therapy; copd oxygen:89160}  Ventilator:    {[facility] CC Vent NXRW:098270615}    Rehab Therapies: {THERAPEUTIC INTERVENTION:3000166135}  Weight Bearing Status/Restrictions: [employer]  Weight Bearin}  Other Medical Equipment (for information only, NOT a DME order):  {EQUIPMENT:139719450}  Other Treatments: ***    Patient's personal belongings (please select all that are sent with patient):  {[facility] DME Belongings:860975567}    RN SIGNATURE:  {Esignature:123773464}    CASE MANAGEMENT/SOCIAL WORK SECTION    Inpatient Status Date: ***    Readmission Risk Assessment Score:  Readmission Risk              Risk of Unplanned Readmission:  11           Discharging to Facility/ Agency   Name:   Address:  Phone:  Fax:    Dialysis Facility (if applicable)   Name:  Address:  Dialysis Schedule:  Phone:  Fax:    / signature: {Esignature:017114063}    PHYSICIAN SECTION    Prognosis: {Prognosis:1556371507}    Condition at Discharge: Natalia Hernandez Patient Condition:132639715}    Rehab Potential (if transferring to Rehab): {Prognosis:4252366931}    Recommended Labs or Other Treatments After Discharge: ***    Physician Certification: I certify the above information and transfer of Briana George  is necessary for the continuing treatment of the diagnosis listed and that she requires {Admit to Appropriate Level of Care:83740} for {GREATER/LESS:175854517} 30 days.      Update Admission H&P: {CHP DME Changes in GWOCP:202291832}    PHYSICIAN SIGNATURE:  {Esignature:288951010}

## 2021-12-06 VITALS
BODY MASS INDEX: 30.97 KG/M2 | WEIGHT: 197.75 LBS | RESPIRATION RATE: 18 BRPM | SYSTOLIC BLOOD PRESSURE: 128 MMHG | DIASTOLIC BLOOD PRESSURE: 92 MMHG | TEMPERATURE: 98 F | OXYGEN SATURATION: 98 % | HEART RATE: 111 BPM

## 2021-12-06 PROBLEM — I21.3 STEMI (ST ELEVATION MYOCARDIAL INFARCTION) (HCC): Status: ACTIVE | Noted: 2021-12-06

## 2021-12-06 LAB
ANION GAP SERPL CALCULATED.3IONS-SCNC: 15 MMOL/L (ref 9–17)
BUN BLDV-MCNC: 19 MG/DL (ref 8–23)
BUN/CREAT BLD: ABNORMAL (ref 9–20)
CALCIUM SERPL-MCNC: 9.2 MG/DL (ref 8.6–10.4)
CHLORIDE BLD-SCNC: 100 MMOL/L (ref 98–107)
CO2: 22 MMOL/L (ref 20–31)
CREAT SERPL-MCNC: 0.72 MG/DL (ref 0.5–0.9)
EKG ATRIAL RATE: 106 BPM
EKG P AXIS: 60 DEGREES
EKG P-R INTERVAL: 230 MS
EKG Q-T INTERVAL: 326 MS
EKG QRS DURATION: 90 MS
EKG QTC CALCULATION (BAZETT): 433 MS
EKG R AXIS: 21 DEGREES
EKG T AXIS: 81 DEGREES
EKG VENTRICULAR RATE: 106 BPM
ESTIMATED AVERAGE GLUCOSE: 192 MG/DL
GFR AFRICAN AMERICAN: >60 ML/MIN
GFR NON-AFRICAN AMERICAN: >60 ML/MIN
GFR SERPL CREATININE-BSD FRML MDRD: ABNORMAL ML/MIN/{1.73_M2}
GFR SERPL CREATININE-BSD FRML MDRD: ABNORMAL ML/MIN/{1.73_M2}
GLUCOSE BLD-MCNC: 245 MG/DL (ref 70–99)
GLUCOSE BLD-MCNC: 266 MG/DL (ref 65–105)
HBA1C MFR BLD: 8.3 % (ref 4–6)
POTASSIUM SERPL-SCNC: 4.2 MMOL/L (ref 3.7–5.3)
SODIUM BLD-SCNC: 137 MMOL/L (ref 135–144)
TROPONIN INTERP: ABNORMAL
TROPONIN T: ABNORMAL NG/ML
TROPONIN, HIGH SENSITIVITY: 1868 NG/L (ref 0–14)

## 2021-12-06 PROCEDURE — 6370000000 HC RX 637 (ALT 250 FOR IP): Performed by: STUDENT IN AN ORGANIZED HEALTH CARE EDUCATION/TRAINING PROGRAM

## 2021-12-06 PROCEDURE — 36415 COLL VENOUS BLD VENIPUNCTURE: CPT

## 2021-12-06 PROCEDURE — 82947 ASSAY GLUCOSE BLOOD QUANT: CPT

## 2021-12-06 PROCEDURE — 93325 DOPPLER ECHO COLOR FLOW MAPG: CPT

## 2021-12-06 PROCEDURE — 80048 BASIC METABOLIC PNL TOTAL CA: CPT

## 2021-12-06 PROCEDURE — 93010 ELECTROCARDIOGRAM REPORT: CPT | Performed by: INTERNAL MEDICINE

## 2021-12-06 PROCEDURE — 93308 TTE F-UP OR LMTD: CPT

## 2021-12-06 PROCEDURE — 83036 HEMOGLOBIN GLYCOSYLATED A1C: CPT

## 2021-12-06 PROCEDURE — 84484 ASSAY OF TROPONIN QUANT: CPT

## 2021-12-06 RX ORDER — METOPROLOL TARTRATE 50 MG/1
50 TABLET, FILM COATED ORAL 2 TIMES DAILY
Qty: 60 TABLET | Refills: 3 | Status: SHIPPED | OUTPATIENT
Start: 2021-12-06 | End: 2022-06-06

## 2021-12-06 RX ORDER — ASPIRIN 81 MG/1
81 TABLET, CHEWABLE ORAL DAILY
Qty: 30 TABLET | Refills: 3 | Status: SHIPPED | OUTPATIENT
Start: 2021-12-07

## 2021-12-06 RX ORDER — ATORVASTATIN CALCIUM 40 MG/1
40 TABLET, FILM COATED ORAL NIGHTLY
Qty: 30 TABLET | Refills: 3 | Status: SHIPPED | OUTPATIENT
Start: 2021-12-06 | End: 2022-05-05 | Stop reason: SDUPTHER

## 2021-12-06 RX ORDER — FUROSEMIDE 20 MG/1
20 TABLET ORAL PRN
Qty: 60 TABLET | Refills: 3 | Status: SHIPPED | OUTPATIENT
Start: 2021-12-06 | End: 2022-06-06

## 2021-12-06 RX ADMIN — ASPIRIN 81 MG: 81 TABLET, CHEWABLE ORAL at 09:58

## 2021-12-06 RX ADMIN — FUROSEMIDE 40 MG: 40 TABLET ORAL at 09:58

## 2021-12-06 RX ADMIN — INSULIN LISPRO 4 UNITS: 100 INJECTION, SOLUTION INTRAVENOUS; SUBCUTANEOUS at 10:07

## 2021-12-06 RX ADMIN — METOPROLOL TARTRATE 50 MG: 25 TABLET ORAL at 09:57

## 2021-12-06 RX ADMIN — TICAGRELOR 90 MG: 90 TABLET ORAL at 09:57

## 2021-12-06 RX ADMIN — INSULIN LISPRO 8 UNITS: 100 INJECTION, SOLUTION INTRAVENOUS; SUBCUTANEOUS at 12:54

## 2021-12-06 NOTE — DISCHARGE SUMMARY
[facility]  Discharge Note                 Name:  Ti Winn  YOB: 1955  Social Security Number:  [id number]  Medical Record Number:  [id number]    Date of Admission:  12/3/2021  Date of Discharge:  12/6/2021    Admitting physician: Hodan Kramer MD    Discharge Attending: Antoine Nevarez MD, MD  Primary Care Physician: Tai Fry MD    Discharge to Home  Condition at discharge: [address] LIST:  Patient Active Problem List   Diagnosis    Cancer ([facility]    Hypertension    Renal cell carcinoma ([facility]    DDD (degenerative disc disease)    Kidney stone    Stress incontinence    GERD (gastroesophageal reflux disease)    Obesity    Chest pain    Impaired fasting blood sugar    Cervical disc disorder    [facility] ([facility]    Osteopenia    [facility] infarction ([facility]    STEMI (ST elevation myocardial infarction) ([facility]         Procedures:none, cardiac catheterization    HOSPITAL COURSE :         Patient was admitted with inferior STEMI and was taken emergently to cath lab. She underwent cardiac cath and was noted to have Acute occlusion of the mid RCA, required PTCA -JOHN PAUL. She had Residual disease in branches, D1, Om1 and OM2. Patient was started on aspirin, brilinta and atorvastatin. She was started on lopressor 50 mg BID. She is planned for staged stent to OM1, OM2 and possibly d1     Medications changes recommendation: Please see below  Asa 81  brilinta 90 mg BID  Atorvastatin 40 mg  Metoprolol 50 mg BID  Prn lasix 20 mg     Discharge exam:   Vitals:    12/06/21 1657   BP: (!) 128/92   Pulse: 111   Resp: 18   Temp: 98 °F (36.7 °C)   SpO2: 98%     Neuro: normal  Chest: Clear to ausculation. No wheezing.   Cardiac: Cor RRR  Abdomen/groin: soft, non-tender, without masses or organomegaly  Lower extremity edema: none     Follow up with primary care provider 1 week  Follow up with cardiology 4 weeks  Follow up with other consultant physicians at their directions. Discharge Medications:  @DISCHARGEMEDSLIST(<NOROUTINE> error)@   Current Facility-Administered Medications: insulin lispro (HUMALOG) injection vial 0-12 Units, 0-12 Units, SubCUTAneous, TID WC  insulin lispro (HUMALOG) injection vial 0-6 Units, 0-6 Units, SubCUTAneous, Nightly  glucose (GLUTOSE) 40 % oral gel 15 g, 15 g, Oral, PRN  dextrose 50 % IV solution, 12.5 g, IntraVENous, PRN  glucagon (rDNA) injection 1 mg, 1 mg, IntraMUSCular, PRN  dextrose 5 % solution, 100 mL/hr, IntraVENous, PRN  furosemide (LASIX) tablet 20 mg, oral, PRN  metoprolol tartrate (LOPRESSOR) tablet 50 mg, 50 mg, Oral, BID  aspirin chewable tablet 81 mg, 81 mg, Oral, Daily  ticagrelor (BRILINTA) tablet 90 mg, 90 mg, Oral, BID  atorvastatin (LIPITOR) tablet 40 mg, 40 mg, Oral, Nightly  sodium chloride flush 0.9 % injection 5-40 mL, 5-40 mL, IntraVENous, 2 times per day  sodium chloride flush 0.9 % injection 5-40 mL, 5-40 mL, IntraVENous, PRN  0.9 % sodium chloride infusion, 25 mL, IntraVENous, PRN  acetaminophen (TYLENOL) tablet 650 mg, 650 mg, Oral, Q4H PRN    Coronary Discharge Core Measure: Please indicate the medication given by X, and if not the reasons not given:    Not Given Reason  Given      Beta Blockers x      ACE-I x      Statins x      ASA x      OAP (Plavix/Effient/Brilinta) x           Discussed with patient and nursing. Medications and discharge instructions reviewed with patient and nursing.     Electronically signed by Lena Yañez MD on 12/6/2021 at 1850 [facility]

## 2021-12-06 NOTE — PROGRESS NOTES
Unknown)   SpO2 98%   BMI 30.97 kg/m²   General appearance: Alert. No acute distress. HEENT: Head: Normal, normocephalic, atraumatic. Neck: no JVD, trachea midline  Lungs: Clear to auscultation bilaterally, no wheeze or crackles  Heart: Regular rate and rhythm, S1, S2 normal, no murmur  Abdomen: Soft, non-tender  Extremities: No edema  Neurologic: No focal neurologic deficits        ECHO 12/4/21  Global left ventricular systolic function is normal. Calculated ejection  fraction 48% by Sequeira's method. Visually estimated EF 50-55%. Moderate concentric left ventricular hypertrophy. No significant valvular regurgitation or stenosis seen. CATH 12/3/21:  Multivessel CAD    Acute occlusion of the mid RCA, required PTCA -JOHN PAUL    Residual disease in branches, D1, Om1 and OM2.    PLower normal LV function        Recommendations        Post MI protocol    Plan on 2nd stage stent to OM1, OM2 and possibly d1        Assessment:   1. Inferior STEMI, presented with chest pain on-going for 2 days  2. S/p PTCA-JOHN PAUL to mid RCA. Residual disease in D1, Om1 and Om2  3. HTN  4. HL  5. H/o renal cell cancer  6. LVEF 48%            Plan / Recommendations:   Continue aspirin and brilinta  Continue atorvastatin 40 mg  lopressor 50 mg BID  Trend troponin   Plan on 2nd stage stent to OM1, OM2 and possibly d1   Will follow up limited echo      Thank you for allowing us to participate in [address] [place] [address]. Electronically signed on 12/06/21 at 9:53 AM by:    Mina Christopher MD, MD   Fellow, [address]     I performed a history and physical examination of the patient and discussed management with the resident. I reviewed the residents note and agree with the documented findings and plan of care. Any areas of disagreement are noted on the chart. I was personally present for the key portions of any procedures.  I have documented in the chart those procedures where I was not present during the key portions. I have personally evaluated this patient and have completed at least one if not all key elements of the E/M (history, physical exam, and MDM). Additional findings are as noted. Change lasix to PRN. Stage PCI will be scheduled. Cardiac rehab.     Rk Queen MD

## 2021-12-07 ENCOUNTER — CARE COORDINATION (OUTPATIENT)
Dept: CASE MANAGEMENT | Age: 66
End: 2021-12-07

## 2021-12-07 DIAGNOSIS — I21.3 ST ELEVATION MYOCARDIAL INFARCTION (STEMI), UNSPECIFIED ARTERY (HCC): Primary | ICD-10-CM

## 2021-12-07 PROCEDURE — 1111F DSCHRG MED/CURRENT MED MERGE: CPT | Performed by: FAMILY MEDICINE

## 2021-12-07 NOTE — CARE COORDINATION
Winter 45 Transitions Initial Follow Up Call    Call within 2 business days of discharge: Yes    Patient: Jessica Wiley Patient : 1955   MRN: <[id number]>  Reason for Admission: STEMI  Discharge Date: 21 RARS: Readmission Risk Score: 6.7 ( )      Last Discharge [facility]       Complaint Diagnosis Description Type Department Provider    12/3/21  Acute ST elevation myocardial infarction (STEMI) involving right coronary artery [facility]) Admission (Discharged) from [address] [place] [address] / Christian Nunezody 1 Johanna Gaytan MD    12/3/21 Chest Pain ST elevation myocardial infarction (STEMI), unspecified artery [facility]) ED (TRANSFER) [address] [facility] ED Verónica Calvert DO           Spoke with: Queen Bertha who states she is \"good\" she denies chest pain, SOB< dizziness, tingling, numbing , jaw pain, she is eating and drinking well, normal bowel and bladder elimination. She states groin area is sore and bruising is noted no drainage. Has f/u appointments in place and medication reviewed taking all as directed. 1111 f complete. Patient has no concerns at present time, agreeable to calls for updates with care. Facility: [facility]    Non-face-to-face services provided:  Obtained and reviewed discharge summary and/or continuity of care documents  Transitions of Care Initial Call    Was this an external facility discharge? No     Challenges to be reviewed by the provider   Additional needs identified to be addressed with provider: No  none             Method of communication with provider : none      Advance Care Planning:   Does patient have an Advance Directive: reviewed and current, reviewed and needs to be updated, not on file; education provided, patient declined education, decision maker updated and referral to internal ACP facilitator. Was this a readmission?  No  Patient stated reason for admission: heart cath  Patients top risk factors for readmission: medication management    Care Transition Nurse (CTN) contacted the patient by telephone to perform post hospital discharge assessment. Verified name and  with patient as identifiers. Provided introduction to self, and explanation of the CTN role. CTN reviewed discharge instructions, medical action plan and red flags with patient who verbalized understanding. Patient given an opportunity to ask questions and does not have any further questions or concerns at this time. Were discharge instructions available to patient? No. Reviewed appropriate site of care based on symptoms and resources available to patient including: PCP and Specialist. The patient agrees to contact the PCP office for questions related to their healthcare. Medication reconciliation was performed with patient, who verbalizes understanding of administration of home medications. Advised obtaining a 90-day supply of all daily and as-needed medications. Covid Risk Education     Educated patient about risk for severe COVID-19 due to risk factors according to CDC guidelines. LPN CC reviewed discharge instructions, medical action plan and red flag symptoms with the patient who verbalized understanding. Discussed COVID vaccination status: Yes. Education provided on COVID-19 vaccination as appropriate. Discussed exposure protocols and quarantine with CDC Guidelines. Patient was given an opportunity to verbalize any questions and concerns and agrees to contact LPN CC or health care provider for questions related to their healthcare. Reviewed and educated patient on any new and changed medications related to discharge diagnosis. Was patient discharged with a pulse oximeter? No Discussed and confirmed pulse oximeter discharge instructions and when to notify provider or seek emergency care. LPN CC provided contact information. Plan for follow-up call in 5-7 days based on severity of symptoms and risk factors.   Plan for next call: symptom management-sob, chest pain, cath site       Care Transitions 24 Hour Call    Do you have any ongoing symptoms?: No  Do you have a copy of your discharge instructions?: Yes  Do you have all of your prescriptions and are they filled?: Yes  Have you been contacted by a [address]?: No  Have you scheduled your follow up appointment?: Yes  How are you going to get to your appointment?: Car - family or friend to transport  Were you discharged with any Home Care or Post Acute Services: No  Do you feel like you have everything you need to keep you well at home?: Yes  Care Transitions Interventions         Follow Up  Future Appointments   Date Time Provider Carlos Galvin   12/9/2021  9:40 AM MD GEOFF Espinoza DPP       Melchor Garibay LPN

## 2021-12-08 ENCOUNTER — TELEPHONE (OUTPATIENT)
Dept: FAMILY MEDICINE CLINIC | Age: 66
End: 2021-12-08

## 2021-12-08 NOTE — TELEPHONE ENCOUNTER
Winter 45 Transitions Initial Follow Up Call    Call within 2 business days of discharge: Yes     Patient: Warden Soliman Patient : 1955 MRN: [id number]    [unfilled]    RARS: Readmission Risk Score: 6.7 ( )       Spoke with: patient    Discharge department/facility: [facility]    Non-face-to-face services provided:  Scheduled appointment with PCPIrlanda    Follow Up  Future Appointments   Date Time Provider Carlos Galvin   2021  9:40 AM MD SANTIAGO AhnAM DPP       Tatianna Kinsey LPN

## 2021-12-09 ENCOUNTER — OFFICE VISIT (OUTPATIENT)
Dept: FAMILY MEDICINE CLINIC | Age: 66
End: 2021-12-09
Payer: MEDICARE

## 2021-12-09 VITALS
DIASTOLIC BLOOD PRESSURE: 74 MMHG | WEIGHT: 199 LBS | HEIGHT: 67 IN | BODY MASS INDEX: 31.23 KG/M2 | HEART RATE: 68 BPM | SYSTOLIC BLOOD PRESSURE: 126 MMHG

## 2021-12-09 DIAGNOSIS — I21.11 ST ELEVATION MYOCARDIAL INFARCTION INVOLVING RIGHT CORONARY ARTERY (HCC): Primary | ICD-10-CM

## 2021-12-09 DIAGNOSIS — I10 PRIMARY HYPERTENSION: ICD-10-CM

## 2021-12-09 DIAGNOSIS — E78.2 MIXED HYPERLIPIDEMIA: ICD-10-CM

## 2021-12-09 PROCEDURE — 99213 OFFICE O/P EST LOW 20 MIN: CPT

## 2021-12-09 PROCEDURE — 99495 TRANSJ CARE MGMT MOD F2F 14D: CPT | Performed by: FAMILY MEDICINE

## 2021-12-09 PROCEDURE — 1111F DSCHRG MED/CURRENT MED MERGE: CPT | Performed by: FAMILY MEDICINE

## 2021-12-09 ASSESSMENT — ENCOUNTER SYMPTOMS
RESPIRATORY NEGATIVE: 1
SHORTNESS OF BREATH: 0
EYES NEGATIVE: 1
ALLERGIC/IMMUNOLOGIC NEGATIVE: 1
GASTROINTESTINAL NEGATIVE: 1

## 2021-12-09 ASSESSMENT — PATIENT HEALTH QUESTIONNAIRE - PHQ9
SUM OF ALL RESPONSES TO PHQ9 QUESTIONS 1 & 2: 0
1. LITTLE INTEREST OR PLEASURE IN DOING THINGS: 0
SUM OF ALL RESPONSES TO PHQ QUESTIONS 1-9: 0
SUM OF ALL RESPONSES TO PHQ QUESTIONS 1-9: 0
2. FEELING DOWN, DEPRESSED OR HOPELESS: 0
SUM OF ALL RESPONSES TO PHQ QUESTIONS 1-9: 0

## 2021-12-09 NOTE — PATIENT INSTRUCTIONS
Admission on 12/03/2021, Discharged on 12/06/2021   Component Date Value Ref Range Status    Activated Clotting Time 12/03/2021 219* 79 - 149 sec Final    Troponin, High Sensitivity 12/04/2021 3,008* 0 - 14 ng/L Final    Comment:       High Sensitivity Troponin values cannot be compared with other Troponin methodologies. Patients with high levels of Biotin oral intake (i.e >5mg/day) may have falsely decreased   Troponin levels. Samples collected within 8 hours of biotin intake may require additional   information for diagnosis.  Troponin T 12/04/2021 NOT REPORTED  <0.03 ng/mL Final    Troponin Interp 12/04/2021 NOT REPORTED   Final    Glucose 12/04/2021 243* 70 - 99 mg/dL Final    BUN 12/04/2021 14  8 - 23 mg/dL Final    CREATININE 12/04/2021 0.68  0.50 - 0.90 mg/dL Final    Bun/Cre Ratio 12/04/2021 NOT REPORTED  9 - 20 Final    Calcium 12/04/2021 9.0  8.6 - 10.4 mg/dL Final    Sodium 12/04/2021 137  135 - 144 mmol/L Final    Potassium 12/04/2021 4.0  3.7 - 5.3 mmol/L Final    Chloride 12/04/2021 100  98 - 107 mmol/L Final    CO2 12/04/2021 21  20 - 31 mmol/L Final    Anion Gap 12/04/2021 16  9 - 17 mmol/L Final    GFR Non- 12/04/2021 >60  >60 mL/min Final    GFR  12/04/2021 >60  >60 mL/min Final    GFR Comment 12/04/2021        Final    Comment: Average GFR for 61-76 years old:   80 mL/min/1.73sq m  Chronic Kidney Disease:   <60 mL/min/1.73sq m  Kidney failure:   <15 mL/min/1.73sq m              eGFR calculated using average adult body mass.  Additional eGFR calculator available at:        [employer]            GFR Staging 12/04/2021 NOT REPORTED   Final    WBC 12/04/2021 10.7  3.5 - 11.3 k/uL Final    RBC 12/04/2021 4.92  3.95 - 5.11 m/uL Final    Hemoglobin 12/04/2021 13.2  11.9 - 15.1 g/dL Final    Hematocrit 12/04/2021 39.4  36.3 - 47.1 % Final    MCV 12/04/2021 80.1* 82.6 - 102.9 fL Final    [facility] 12/04/2021 26.8  25.2 - 33.5 pg Final    MCHC 12/04/2021 33.5  28.4 - 34.8 g/dL Final    RDW 12/04/2021 13.3  11.8 - 14.4 % Final    Platelets 63/38/9001 254  138 - 453 k/uL Final    MPV 12/04/2021 9.9  8.1 - 13.5 fL Final    NRBC Automated 12/04/2021 0.0  0.0 per 100 WBC Final    Differential Type 12/04/2021 NOT REPORTED   Final    Seg Neutrophils 12/04/2021 68* 36 - 65 % Final    Lymphocytes 12/04/2021 21* 24 - 43 % Final    Monocytes 12/04/2021 9  3 - 12 % Final    Eosinophils % 12/04/2021 1  1 - 4 % Final    Basophils 12/04/2021 0  0 - 2 % Final    Immature Granulocytes 12/04/2021 1* 0 % Final    Segs Absolute 12/04/2021 7.40  1.50 - 8.10 k/uL Final    Absolute Lymph # 12/04/2021 2.19  1.10 - 3.70 k/uL Final    Absolute Mono # 12/04/2021 0.96  0.10 - 1.20 k/uL Final    Absolute Eos # 12/04/2021 0.07  0.00 - 0.44 k/uL Final    Basophils Absolute 12/04/2021 0.03  0.00 - 0.20 k/uL Final    Absolute Immature Granulocyte 12/04/2021 0.05  0.00 - 0.30 k/uL Final    WBC Morphology 12/04/2021 NOT REPORTED   Final    RBC Morphology 12/04/2021 MICROCYTOSIS PRESENT   Final    Platelet Estimate 70/20/4467 NOT REPORTED   Final    Ventricular Rate 12/04/2021 93  BPM Final    Atrial Rate 12/04/2021 93  BPM Final    P-R Interval 12/04/2021 246  ms Final    QRS Duration 12/04/2021 90  ms Final    Q-T Interval 12/04/2021 374  ms Final    QTc Calculation (Bazett) 12/04/2021 465  ms Final    P Axis 12/04/2021 43  degrees Final    R Axis 12/04/2021 11  degrees Final    T Axis 12/04/2021 90  degrees Final    Troponin, High Sensitivity 12/04/2021 2,006* 0 - 14 ng/L Final    Comment:       High Sensitivity Troponin values cannot be compared with other Troponin methodologies. Patients with high levels of Biotin oral intake (i.e >5mg/day) may have falsely decreased   Troponin levels. Samples collected within 8 hours of biotin intake may require additional   information for diagnosis.   Previous Alert Value Reported      Troponin T 12/04/2021 NOT REPORTED  <0.03 ng/mL Final    Troponin Interp 12/04/2021 NOT REPORTED   Final    Troponin, High Sensitivity 12/04/2021 1,870* 0 - 14 ng/L Final    Comment:       High Sensitivity Troponin values cannot be compared with other Troponin methodologies. Patients with high levels of Biotin oral intake (i.e >5mg/day) may have falsely decreased   Troponin levels. Samples collected within 8 hours of biotin intake may require additional   information for diagnosis. Previous Alert Value Reported      Troponin T 12/04/2021 NOT REPORTED  <0.03 ng/mL Final    Troponin Interp 12/04/2021 NOT REPORTED   Final    Glucose 12/05/2021 224* 70 - 99 mg/dL Final    BUN 12/05/2021 17  8 - 23 mg/dL Final    CREATININE 12/05/2021 0.73  0.50 - 0.90 mg/dL Final    Bun/Cre Ratio 12/05/2021 NOT REPORTED  9 - 20 Final    Calcium 12/05/2021 9.0  8.6 - 10.4 mg/dL Final    Sodium 12/05/2021 137  135 - 144 mmol/L Final    Potassium 12/05/2021 4.1  3.7 - 5.3 mmol/L Final    Chloride 12/05/2021 98  98 - 107 mmol/L Final    CO2 12/05/2021 25  20 - 31 mmol/L Final    Anion Gap 12/05/2021 14  9 - 17 mmol/L Final    GFR Non- 12/05/2021 >60  >60 mL/min Final    GFR  12/05/2021 >60  >60 mL/min Final    GFR Comment 12/05/2021        Final    Comment: Average GFR for 61-76 years old:   80 mL/min/1.73sq m  Chronic Kidney Disease:   <60 mL/min/1.73sq m  Kidney failure:   <15 mL/min/1.73sq m              eGFR calculated using average adult body mass. Additional eGFR calculator available at:        [employer]plife.com.br            GFR Staging 12/05/2021 NOT REPORTED   Final    Troponin, High Sensitivity 12/05/2021 1,722* 0 - 14 ng/L Final    Comment:       High Sensitivity Troponin values cannot be compared with other Troponin methodologies.         Patients with high levels of Biotin oral intake (i.e >5mg/day) may have falsely decreased Troponin levels. Samples collected within 8 hours of biotin intake may require additional   information for diagnosis.   Previous Alert Value Reported      Troponin T 12/05/2021 NOT REPORTED  <0.03 ng/mL Final    Troponin Interp 12/05/2021 NOT REPORTED   Final    WBC 12/05/2021 10.9  3.5 - 11.3 k/uL Final    RBC 12/05/2021 4.89  3.95 - 5.11 m/uL Final    Hemoglobin 12/05/2021 13.6  11.9 - 15.1 g/dL Final    Hematocrit 12/05/2021 39.5  36.3 - 47.1 % Final    MCV 12/05/2021 80.8* 82.6 - 102.9 fL Final    MCH 12/05/2021 27.8  25.2 - 33.5 pg Final    MCHC 12/05/2021 34.4  28.4 - 34.8 g/dL Final    RDW 12/05/2021 13.2  11.8 - 14.4 % Final    Platelets 40/52/1459 255  138 - 453 k/uL Final    MPV 12/05/2021 10.0  8.1 - 13.5 fL Final    NRBC Automated 12/05/2021 0.0  0.0 per 100 WBC Final    Differential Type 12/05/2021 NOT REPORTED   Final    Seg Neutrophils 12/05/2021 70* 36 - 65 % Final    Lymphocytes 12/05/2021 20* 24 - 43 % Final    Monocytes 12/05/2021 8  3 - 12 % Final    Eosinophils % 12/05/2021 1  1 - 4 % Final    Basophils 12/05/2021 1  0 - 2 % Final    Immature Granulocytes 12/05/2021 0  0 % Final    Segs Absolute 12/05/2021 7.59  1.50 - 8.10 k/uL Final    Absolute Lymph # 12/05/2021 2.23  1.10 - 3.70 k/uL Final    Absolute Mono # 12/05/2021 0.89  0.10 - 1.20 k/uL Final    Absolute Eos # 12/05/2021 0.13  0.00 - 0.44 k/uL Final    Basophils Absolute 12/05/2021 0.05  0.00 - 0.20 k/uL Final    Absolute Immature Granulocyte 12/05/2021 0.03  0.00 - 0.30 k/uL Final    WBC Morphology 12/05/2021 NOT REPORTED   Final    RBC Morphology 12/05/2021 MICROCYTOSIS PRESENT   Final    Platelet Estimate 30/57/6565 NOT REPORTED   Final    POC Glucose 12/05/2021 286* 65 - 105 mg/dL Final    POC Glucose 12/05/2021 236* 65 - 105 mg/dL Final    POC Glucose 12/05/2021 256* 65 - 105 mg/dL Final    Ventricular Rate 12/05/2021 106  BPM Final    Atrial Rate 12/05/2021 106  BPM Final    P-R Interval 12/05/2021 230  ms Final    QRS Duration 12/05/2021 90  ms Final    Q-T Interval 12/05/2021 326  ms Final    QTc Calculation (Bazett) 12/05/2021 433  ms Final    P Axis 12/05/2021 60  degrees Final    R Axis 12/05/2021 21  degrees Final    T Axis 12/05/2021 81  degrees Final    Glucose 12/06/2021 245* 70 - 99 mg/dL Final    BUN 12/06/2021 19  8 - 23 mg/dL Final    CREATININE 12/06/2021 0.72  0.50 - 0.90 mg/dL Final    Bun/Cre Ratio 12/06/2021 NOT REPORTED  9 - 20 Final    Calcium 12/06/2021 9.2  8.6 - 10.4 mg/dL Final    Sodium 12/06/2021 137  135 - 144 mmol/L Final    Potassium 12/06/2021 4.2  3.7 - 5.3 mmol/L Final    Chloride 12/06/2021 100  98 - 107 mmol/L Final    CO2 12/06/2021 22  20 - 31 mmol/L Final    Anion Gap 12/06/2021 15  9 - 17 mmol/L Final    GFR Non- 12/06/2021 >60  >60 mL/min Final    GFR  12/06/2021 >60  >60 mL/min Final    GFR Comment 12/06/2021        Final    Comment: Average GFR for 61-76 years old:   80 mL/min/1.73sq m  Chronic Kidney Disease:   <60 mL/min/1.73sq m  Kidney failure:   <15 mL/min/1.73sq m              eGFR calculated using average adult body mass. Additional eGFR calculator available at:        [employer]            GFR Staging 12/06/2021 NOT REPORTED   Final    POC Glucose 12/05/2021 286* 65 - 105 mg/dL Final    Troponin, High Sensitivity 12/06/2021 1,868* 0 - 14 ng/L Final    Comment:       High Sensitivity Troponin values cannot be compared with other Troponin methodologies. Patients with high levels of Biotin oral intake (i.e >5mg/day) may have falsely decreased   Troponin levels. Samples collected within 8 hours of biotin intake may require additional   information for diagnosis.   Previous Alert Value Reported      Troponin T 12/06/2021 NOT REPORTED  <0.03 ng/mL Final    Troponin Interp 12/06/2021 NOT REPORTED   Final    Hemoglobin A1C 12/06/2021 8.3* 4.0 - 6.0 % Final    Estimated Avg Glucose 12/06/2021 192  mg/dL Final    Comment: The ADA and AACC recommend providing the estimated average glucose result to permit better   patient understanding of their HBA1c result.       POC Glucose 12/06/2021 266* 65 - 105 mg/dL Final

## 2021-12-09 NOTE — PROGRESS NOTES
Post-Discharge Transitional Care Management Services or Hospital Follow Up      Fátima Small   YOB: 1955    Date of Office Visit:  12/9/2021  Date of Hospital Admission: 12/3/21  Date of Hospital Discharge: 12/6/21  Readmission Risk Score(high >=14%.  Medium >=10%):Readmission Risk Score: 6.7 ( )      Care management risk score Rising risk (score 2-5) and Complex Care (Scores >=6): 5     Non face to face  following discharge, date last encounter closed (first attempt may have been earlier): 12/8/2021  3:45 PM 12/8/2021  3:45 PM    Call initiated 2 business days of discharge: Yes     Patient Active Problem List   Diagnosis    [facility]    Hypertension    Renal cell carcinoma ([facility]    DDD (degenerative disc disease)    Kidney stone    Stress incontinence    GERD (gastroesophageal reflux disease)    Obesity    Chest pain    Impaired fasting blood sugar    Cervical disc disorder    Type 2 diabetes mellitus (HCC)    Osteopenia    Acute myocardial infarction ([facility]    STEMI (ST elevation myocardial infarction) ([facility]       No Known Allergies    Medications listed as ordered at the time of discharge from hospital  @DISCHARGEMEDSLIST(<NOROUTINE> error)@      Medications marked \"taking\" at this time  Outpatient Medications Marked as Taking for the 12/9/21 encounter (Office Visit) with Satish Felix MD   Medication Sig Dispense Refill    ticagrelor (BRILINTA) 90 MG TABS tablet Take 1 tablet by mouth 2 times daily 60 tablet 3    atorvastatin (LIPITOR) 40 MG tablet Take 1 tablet by mouth nightly 30 tablet 3    aspirin 81 MG chewable tablet Take 1 tablet by mouth daily 30 tablet 3    metoprolol tartrate (LOPRESSOR) 50 MG tablet Take 1 tablet by mouth 2 times daily 60 tablet 3    furosemide (LASIX) 20 MG tablet Take 1 tablet by mouth as needed (for weight gain > 30 pounds) 60 tablet 3    omeprazole (PRILOSEC) 20 MG delayed release capsule TAKE 1 CAPSULE DAILY (Patient taking differently: TAKE 1 CAPSULE DAILY   Patient taking PRN) 90 capsule 3    glimepiride (AMARYL) 2 MG tablet Take 1 tablet by mouth twice daily with meals 180 tablet 3    VITAMIN D PO Take by mouth      nitroGLYCERIN (NITROSTAT) 0.4 MG SL tablet Place 1 tablet under the tongue every 5 minutes as needed for Chest pain 25 tablet 0        Medications patient taking as of now reconciled against medications ordered at time of hospital discharge: Yes    Chief Complaint   Patient presents with    Follow-Up from [address] [place] [address]     12/03/21-s/p cardiac stent placement  STEMI       HPI Scheduled transitional care visit for recent hospitalization for inferior STEMI. She underwent cardiac cath with acute occlusion of the mid RCA. S/p PTCA JOHN PAUL. Residual disease in branches D1, OM1, and OM2. Planning staged stents in a couple of weeks with 2-3 more stents to branches above. No significant tightness since discharge. Left ventricle is normal in size, global left ventricular systolic function is normal.  Calculated ejection fraction is 55%. Mild to moderate left ventricular hypertrophy. Aortic valve is trileaflet. No evidence of aortic insufficiency or stenosis (visually.)  Inpatient course: Discharge summary reviewed- see chart. Interval history/Current status: stable    Review of Systems   Constitutional: Negative. HENT: Negative. Eyes: Negative. Respiratory: Negative. Negative for shortness of breath. Cardiovascular: Negative for chest pain, palpitations and leg swelling. Gastrointestinal: Negative. Endocrine: Negative. Genitourinary: Negative. Musculoskeletal: Negative. Skin: Negative. Allergic/Immunologic: Negative. Neurological: Negative. Hematological: Negative. Does not bruise/bleed easily. Psychiatric/Behavioral: Negative.         Vitals:    12/09/21 0946   BP: 126/74   Site: Left Upper Arm   Position: Sitting   Cuff Size: Large Adult   Pulse: 68   Weight: 199 lb (90.3 kg)   Height: 5' 7\" (1.702 m)     Body mass index is 31.17 kg/m². Wt Readings from Last 3 Encounters:   12/09/21 199 lb (90.3 kg)   12/05/21 197 lb 12 oz (89.7 kg)   12/03/21 198 lb (89.8 kg)     BP Readings from Last 3 Encounters:   12/09/21 126/74   12/06/21 (!) 128/92   12/03/21 (!) 185/79       Physical Exam  Constitutional:       General: She is not in acute distress. Appearance: She is well-developed and normal weight. She is not toxic-appearing. HENT:      Head: Normocephalic and atraumatic. Right Ear: External ear normal.      Left Ear: External ear normal.      Nose: Nose normal.      Mouth/Throat:      Pharynx: No oropharyngeal exudate. Eyes:      General: No scleral icterus. Extraocular Movements: Extraocular movements intact. Conjunctiva/sclera: Conjunctivae normal.   Neck:      Thyroid: No thyromegaly. Cardiovascular:      Rate and Rhythm: Normal rate and regular rhythm. Pulses: Normal pulses. Heart sounds: Normal heart sounds. No murmur heard. Pulmonary:      Effort: Pulmonary effort is normal. No respiratory distress. Breath sounds: Normal breath sounds. No wheezing. Abdominal:      General: Bowel sounds are normal. There is no distension. Palpations: Abdomen is soft. Tenderness: There is no abdominal tenderness. There is no rebound. Musculoskeletal:         General: No tenderness. Normal range of motion. Cervical back: Normal range of motion and neck supple. Right lower leg: No edema. Left lower leg: No edema. Legs:    Skin:     General: Skin is warm and dry. Findings: No erythema or rash. Neurological:      Mental Status: She is alert and oriented to person, place, and time. Psychiatric:         Mood and Affect: Mood normal.         Behavior: Behavior normal.         Thought Content:  Thought content normal.         Judgment: Judgment normal.     /74 (Site: Left Upper Arm, Position: Sitting, Cuff Size: Large Adult)   Pulse 68   Ht 5' 7\" (1.702 m)   Wt 199 lb (90.3 kg)   LMP  (LMP Unknown)   BMI 31.17 kg/m²           Assessment/Plan:  CAD s/p ptca and stent to RCA. Planning staged stents to 2-3 branch vessels in the future. Started asa, brilinta, atorvastatin, metoprolol , prn lasix  No concerns for side effects at present. No significant /recurrent chest pain. Cont. Current medications /dosing. Cont. Cardiology follow up for staged stents as scheduled .             Medical Decision Making: moderate complexity

## 2021-12-14 ENCOUNTER — CARE COORDINATION (OUTPATIENT)
Dept: CASE MANAGEMENT | Age: 66
End: 2021-12-14

## 2021-12-14 NOTE — CARE COORDINATION
information for future needs. Plan for follow-up call in 7-10 days based on severity of symptoms and risk factors. Plan for next call: symptom management-post stent placement        Care Transitions Subsequent and Final Call    Subsequent and Final Calls  Do you have any ongoing symptoms?: Yes  Onset of Patient-reported symptoms: In the past 7 days  Patient-reported symptoms: Chest Pain, Shortness of Breath  Interventions for patient-reported symptoms: Notified PCP/Physician  Have your medications changed?: No  Do you have any questions related to your medications?: No  Do you currently have any active services?: No  Do you have any needs or concerns that I can assist you with?: No  Identified Barriers: Other  Care Transitions Interventions  Other Interventions:            Follow Up  Future Appointments   Date Time Provider Carlos Galvin   6/21/2022  8:00 AM MD [facility]       Qasim Choi LPN

## 2021-12-17 ENCOUNTER — TELEPHONE (OUTPATIENT)
Dept: FAMILY MEDICINE CLINIC | Age: 66
End: 2021-12-17

## 2021-12-17 NOTE — TELEPHONE ENCOUNTER
----- Message from Elissa Ramirez sent at 12/17/2021 11:30 AM EST -----  Subject: Message to Provider    QUESTIONS  Information for Provider? pt was recently hospitilized and doesnt have a   hos f/u appt discharged appt. please go back to appt notes and dischage   orders. LAM verdugo called to confirm   ---------------------------------------------------------------------------  --------------  CALL BACK INFO  What is the best way for the office to contact you? OK to leave message on   voicemail  Preferred Call Back Phone Number? [phone]  ---------------------------------------------------------------------------  --------------  SCRIPT ANSWERS  Relationship to Patient?  Third Party  Representative Name? Alphonso CAMPOS)

## 2021-12-21 ENCOUNTER — HOSPITAL ENCOUNTER (OUTPATIENT)
Dept: CARDIAC CATH/INVASIVE PROCEDURES | Age: 66
Discharge: HOME | End: 2021-12-21
Payer: MEDICARE

## 2021-12-21 VITALS
WEIGHT: 198 LBS | RESPIRATION RATE: 19 BRPM | TEMPERATURE: 97.9 F | DIASTOLIC BLOOD PRESSURE: 49 MMHG | OXYGEN SATURATION: 100 % | BODY MASS INDEX: 31.08 KG/M2 | HEIGHT: 67 IN | SYSTOLIC BLOOD PRESSURE: 132 MMHG | HEART RATE: 78 BPM

## 2021-12-21 LAB
ACTIVATED CLOTTING TIME: 233 SEC (ref 79–149)
GFR NON-AFRICAN AMERICAN: >60 ML/MIN
GFR SERPL CREATININE-BSD FRML MDRD: >60 ML/MIN
GFR SERPL CREATININE-BSD FRML MDRD: NORMAL ML/MIN/{1.73_M2}
GLUCOSE BLD-MCNC: 203 MG/DL (ref 74–100)
PLATELET # BLD: 339 K/UL (ref 138–453)
POC BUN: 16 MG/DL (ref 8–26)
POC CHLORIDE: 105 MMOL/L (ref 98–107)
POC CREATININE: 0.56 MG/DL (ref 0.51–1.19)
POC HEMATOCRIT: 40 % (ref 36–46)
POC HEMOGLOBIN: 13.5 G/DL (ref 12–16)
POC POTASSIUM: 4.4 MMOL/L (ref 3.5–4.5)
POC SODIUM: 142 MMOL/L (ref 138–146)

## 2021-12-21 PROCEDURE — C1760 CLOSURE DEV, VASC: HCPCS

## 2021-12-21 PROCEDURE — 6360000002 HC RX W HCPCS

## 2021-12-21 PROCEDURE — C1894 INTRO/SHEATH, NON-LASER: HCPCS

## 2021-12-21 PROCEDURE — C1769 GUIDE WIRE: HCPCS

## 2021-12-21 PROCEDURE — 85347 COAGULATION TIME ACTIVATED: CPT

## 2021-12-21 PROCEDURE — 6360000004 HC RX CONTRAST MEDICATION

## 2021-12-21 PROCEDURE — 7100000011 HC PHASE II RECOVERY - ADDTL 15 MIN

## 2021-12-21 PROCEDURE — 84132 ASSAY OF SERUM POTASSIUM: CPT

## 2021-12-21 PROCEDURE — 85014 HEMATOCRIT: CPT

## 2021-12-21 PROCEDURE — C9601 PERC DRUG-EL COR STENT BRAN: HCPCS

## 2021-12-21 PROCEDURE — 2500000003 HC RX 250 WO HCPCS

## 2021-12-21 PROCEDURE — 93452 LEFT HRT CATH W/VENTRCLGRPHY: CPT

## 2021-12-21 PROCEDURE — C1725 CATH, TRANSLUMIN NON-LASER: HCPCS

## 2021-12-21 PROCEDURE — 85049 AUTOMATED PLATELET COUNT: CPT

## 2021-12-21 PROCEDURE — 2709999900 HC NON-CHARGEABLE SUPPLY

## 2021-12-21 PROCEDURE — 84295 ASSAY OF SERUM SODIUM: CPT

## 2021-12-21 PROCEDURE — C1887 CATHETER, GUIDING: HCPCS

## 2021-12-21 PROCEDURE — 84520 ASSAY OF UREA NITROGEN: CPT

## 2021-12-21 PROCEDURE — C9600 PERC DRUG-EL COR STENT SING: HCPCS

## 2021-12-21 PROCEDURE — 82565 ASSAY OF CREATININE: CPT

## 2021-12-21 PROCEDURE — 82435 ASSAY OF BLOOD CHLORIDE: CPT

## 2021-12-21 PROCEDURE — 7100000010 HC PHASE II RECOVERY - FIRST 15 MIN

## 2021-12-21 PROCEDURE — C1874 STENT, COATED/COV W/DEL SYS: HCPCS

## 2021-12-21 PROCEDURE — 82947 ASSAY GLUCOSE BLOOD QUANT: CPT

## 2021-12-21 RX ORDER — SODIUM CHLORIDE 9 MG/ML
25 INJECTION, SOLUTION INTRAVENOUS PRN
Status: DISCONTINUED | OUTPATIENT
Start: 2021-12-21 | End: 2021-12-22 | Stop reason: HOSPADM

## 2021-12-21 RX ORDER — SODIUM CHLORIDE 9 MG/ML
INJECTION, SOLUTION INTRAVENOUS CONTINUOUS
Status: DISCONTINUED | OUTPATIENT
Start: 2021-12-21 | End: 2021-12-22 | Stop reason: HOSPADM

## 2021-12-21 RX ORDER — SODIUM CHLORIDE 0.9 % (FLUSH) 0.9 %
5-40 SYRINGE (ML) INJECTION EVERY 12 HOURS SCHEDULED
Status: DISCONTINUED | OUTPATIENT
Start: 2021-12-21 | End: 2021-12-22 | Stop reason: HOSPADM

## 2021-12-21 RX ORDER — ALLOPURINOL 300 MG/1
300 TABLET ORAL DAILY
COMMUNITY

## 2021-12-21 RX ORDER — LISINOPRIL 5 MG/1
5 TABLET ORAL DAILY
COMMUNITY
End: 2022-01-24 | Stop reason: SDUPTHER

## 2021-12-21 RX ORDER — ACETAMINOPHEN 325 MG/1
650 TABLET ORAL EVERY 4 HOURS PRN
Status: DISCONTINUED | OUTPATIENT
Start: 2021-12-21 | End: 2021-12-22 | Stop reason: HOSPADM

## 2021-12-21 RX ORDER — SODIUM CHLORIDE 0.9 % (FLUSH) 0.9 %
5-40 SYRINGE (ML) INJECTION PRN
Status: DISCONTINUED | OUTPATIENT
Start: 2021-12-21 | End: 2021-12-22 | Stop reason: HOSPADM

## 2021-12-21 RX ORDER — NITROGLYCERIN 0.4 MG/1
0.4 TABLET SUBLINGUAL EVERY 5 MIN PRN
Qty: 25 TABLET | Refills: 3 | Status: SHIPPED | OUTPATIENT
Start: 2021-12-21

## 2021-12-21 RX ORDER — AMLODIPINE BESYLATE 5 MG/1
5 TABLET ORAL NIGHTLY
COMMUNITY
End: 2022-01-24

## 2021-12-21 RX ADMIN — SODIUM CHLORIDE: 9 INJECTION, SOLUTION INTRAVENOUS at 10:06

## 2021-12-21 ASSESSMENT — PAIN SCALES - GENERAL: PAINLEVEL_OUTOF10: 0

## 2021-12-21 NOTE — PLAN OF CARE
Problem: Anxiety:  Goal: Level of anxiety will decrease  Description: Level of anxiety will decrease  12/21/2021 1008 by Jenn Wiggins RN  Outcome: Met This Shift

## 2021-12-21 NOTE — PROGRESS NOTES
[x] DISCHARGE INSTRUCTIONS GIVEN WITH 2 WEEK APPT. MADE AND . DOCUMENTED -patient to make own appointment    [x] STENT/PCI GUIDELINES GIVEN    [x] ASA  PRESCRIBED POST PROCEDURE-home med    [x] WAS A BETA BLOCKER ORDERED IF YES WAS SCRIPT GIVEN TO PT-home med. [x] IF EF < 45 % WAS AN ACE INHIBITOR ORDERED-home med    [x] WAS PLAVIX,EFFIENT,BRILINTA, ORDERED IF YES WAS SCRIPT GIVEN TO       PT,AND INSTRUCTIONS ON IMPORTANCE OF MED-home med    [x] DOES PT. NEED 30 & 90 DAY SCRIPTS-90 days    [x] CONCERNS ON PURCHASE OF ANTIPLATELETS IF YES.  SEE PROCEDURE ON      PROCESS FOR PT.S TO OBTAIN THESE MEDS-none    [x] IS PT. ON STATINS IF NO WERE STATINS ORDERED AND SCRIPT GIVEN-home med    [x] WERE MEDS RECONCILED, WAS Pongr INFORMATION ON MEDS GIVEN     TO PT.    [] PRINT DISCHARGE MED LIST CHECK AVS FOR CURRENT MEDS    [x] WAS STENT CARD GIVEN TO PT.

## 2021-12-21 NOTE — H&P
[facility]  Procedure History and Physical Update          Patient Name: Nereida Knox  MRN:    [id number]  YOB: 1955  Date of evaluation:  12/21/2021    Procedure:    Cardiac cath +/- PCI    Indication for procedure:  Known CAD      Please refer to the H&P & progress note completed by Dr. Fantasma Payan & Dr. Ashley Barnes on 12/03/21 & 12/06/21 in the medical record and note that:    [x] I have examined the patient and reviewed the H&P/Consult and there are no changes to be made to the assessment or plan. [] I have examined the patient and reviewed the H&P/Consult and have noted the following changes:    Past Medical History:   Diagnosis Date    Cancer [facility]) 2006    Right Breast cancer    Cervical radiculopathy     DDD (degenerative disc disease)     cervical    GERD (gastroesophageal reflux disease)     Hyperlipidemia     Hypertension     Impaired fasting blood sugar     Kidney stone     Myopia with astigmatism and presbyopia     Obesity     Osteopenia     Renal cell carcinoma ([facility] 06/10/13    right kidney: clear cell type. Corine grade 2 of 4. 2.5cm limited to kidney    Stenosis of cervical spine with myelopathy (HCC)     Stress incontinence     Wears glasses        Past Surgical History:   Procedure Laterality Date    BLADDER SUSPENSION  2011    incontinence    BREAST BIOPSY Right 11/20/2007    wire localization     CERVICAL SPINE SURGERY  05/11/2018     ANTERIOR C4-5, C5-6 ARTHROPLASTY, (ONE PLATE AND FOUR SCREWS REMOVED C6-C7 AND DISCARDED    COLONOSCOPY  05/19/2008    COLONOSCOPY  05/03/2017    [id number]    CORONARY ANGIOPLASTY WITH STENT PLACEMENT  12/03/2021    CYSTOURETHROSCOPY Bilateral 09/02/2021    HYSTERECTOMY, TOTAL ABDOMINAL  2011    with bladder lift    MASTECTOMY Bilateral 12/11/2007    lymphnodes were also removed    NECK SURGERY  06/30/2010    cervical 6-7 microdiscectomy.  Dr. Neil Ma Right 06/10/2013    limited to kidney    [place] OFFICE/OUTPT VISIT,PROCEDURE ONLY N/A 05/11/2018    ANTERIOR C4-5, C5-6 ARTHROPLASTY WITH , SUPINE POSITION, MICROSCOPE, C-ARM, MEDTRONIC(REP NOTIFIED), EVOKES; (ONE PLATE AND FOUR SCREWS REMOVED C6-C7 AND DISCARDED) performed by Erendira Almotne DO at [facility] Left 05/30/2019    TUBAL LIGATION Bilateral 1988    UPPER GASTROINTESTINAL ENDOSCOPY  01/12/2010    normal.  Dr. Carl Rodney History   Problem Relation Age of Onset    High Blood Pressure Mother     Dementia Mother     Cancer Mother         colon cancer    High Blood Pressure Father     Diabetes Father     Heart Disease Father     High Blood Pressure Brother     Cancer Brother         lip    Cancer Maternal Grandmother         breast cancer    Heart Disease Paternal Grandmother     Diabetes Paternal Grandfather        No Known Allergies    Prior to Admission medications    Medication Sig Start Date End Date Taking?  Authorizing Provider   ticagrelor (BRILINTA) 90 MG TABS tablet Take 1 tablet by mouth 2 times daily 12/6/21   Codey Gary MD   atorvastatin (LIPITOR) 40 MG tablet Take 1 tablet by mouth nightly 12/6/21   Codey Gary MD   aspirin 81 MG chewable tablet Take 1 tablet by mouth daily 12/7/21   Codey Gary MD   metoprolol tartrate (LOPRESSOR) 50 MG tablet Take 1 tablet by mouth 2 times daily 12/6/21   Codey Gary MD   furosemide (LASIX) 20 MG tablet Take 1 tablet by mouth as needed (for weight gain > 30 pounds) 12/6/21   Codey Gary MD   omeprazole (PRILOSEC) 20 MG delayed release capsule TAKE 1 CAPSULE DAILY  Patient taking differently: TAKE 1 CAPSULE DAILY   Patient taking PRN 4/22/21   Tono Gonzalez MD   glimepiride (AMARYL) 2 MG tablet Take 1 tablet by mouth twice daily with meals 4/22/21   Tono Gonzalez MD   VITAMIN D PO Take by mouth    Historical Provider, MD   nitroGLYCERIN (NITROSTAT) 0.4 MG SL tablet Place 1 tablet under the tongue every 5 minutes as needed for Chest pain 6/1/20   John Malone MD         There were no vitals filed for this visit. Constitutional and General Appearance:   alert, cooperative, no distress and appears stated age  [de-identified]:  · PERRL, EOMI  Respiratory:  · Normal excursion and expansion without use of accessory muscles  · Resp Auscultation:  Good respiratory effort. No for increased work of breathing. On auscultation: clear to auscultation bilaterally  Cardiovascular:  · Regular rate and rhythm. · S1/S2  · No murmurs. · The apical impulse is not displaced  Abdomen:  · Soft  · Bowel sounds present  · Non-tender to palpation  Extremities:  · No cyanosis or clubbing  · Lower extremity edema: No.  Skin:  · Warm and dry  Neurological:  · Alert and oriented. DATA:  ECHO 12/4/21  Global left ventricular systolic function is normal. Calculated ejection  fraction 48% by Sequeira's method. Visually estimated EF 50-55%. Moderate concentric left ventricular hypertrophy. No significant valvular regurgitation or stenosis seen.     CATH 12/3/21:  Multivessel CAD   Acute occlusion of the mid RCA, required PTCA -JOHN PAUL   Residual disease in branches, D1, Om1 and OM2. PLower normal LV function       Recommendations   Post MI protocol   Plan on 2nd stage stent to OM1, OM2 and possibly d1      Impression:  1. CAD s/p PTCA/JOHN PAUL to mid RCA during Inferior STEMI presentation on 12/3/21. Known Disease in OM1, OM2 & D1  2. HTN  3. HLD  4. Hx of Renal Cell Cancer  5. HFrEF    Plan:  · Proceed with planned procedure (Elective PCI of OM1, OM2 & possibly D1)  · Further orders to follow. Risks, benefits, and alternatives of cardiac catheterization were discussed, in detail, with patient. Risks include, but not limited to, bleeding, requiring blood transfusion, vascular complication requiring surgery, renal failure with need of dialysis, CVA, MI, death and anesthesia complications including intubation were discussed.  Patient verbalized understanding and agreed to proceed with the procedure understanding the above risks and alternatives to the procedure. Risks, benefits, alternatives, and details discussed extensively. Accepts and consents.         Pre Procedure Conscious Sedation Data:  ASA Class:                  [] I [x] II [] III [] IV     Mallampati Class:       [] I [x] II [] III [] Quentin Willett MD  Fellow, [address]

## 2021-12-21 NOTE — PROGRESS NOTES
Bleeding from left arterial puncture and mod sized hematoma present. Head of bed flat. Manual pressure initiated. Cool cloth to forehead. Skin pale. Patient teary eyed.

## 2021-12-21 NOTE — PROGRESS NOTES
Received post procedure to [facility] to room 3. Assessment obtained. Restrictions reviewed with patient. Post procedure pathway initiated. Right femoral site firm , band aid dry and intact. Manual pressure applied per [facility] CVT. Family at side. Patient without complaints. Head of bed flat with right leg straight.

## 2021-12-21 NOTE — OP NOTE
[facility]    CARDIAC CATHETERIZATION    Date:   12/21/2021  Patient name:  Fátima Small  Date of admission:  12/21/2021  8:06 AM  MRN:   [id number]  YOB: 1955    Operators:  Primary:   Fadumo Vang MD (Attending Physician)    Procedure performed:     [x] Left Heart Catheterization. [] Graft Angiography. [x] Left Ventriculography. [] Right Heart Catheterization. [x] Coronary Angiography. [] Aortic Valve Studies. [x] PCI       [] Other:     Indication:  [] STEMI      [] + Stress test  [] ACS      [] + EKG Changes  [] Non Q MI       [] Significant Risk Factors  [] Recurrent Angina             [] Diabetes Mellitus    [] New LBBB      [x] Other.  [] CHF / Low EF changes     [] Abnormal CTA / Ca Score      Procedure:  Access:  [x] Femoral  [] Radial  artery       [x] Right  [] Left    Procedure: After informed consent was obtained with explanation of the risks and benefits, patient was brought to the cath lab. The access area was prepped and draped in sterile fashion. 1% lidocaine was used for local block. The artery was cannulated with 6  Fr sheath with brisk arterial blood return. The side port was frequently flushed and aspirated with normal saline. Estimated Blood Loss:  [x] Minimal < 25 cc [] Moderate 25-50 cc  [] >50 cc    Findings:  LAD:         Lesion on 1st Diag: Proximal subsection. 95% stenosis 15 mm length reduced     to 0%. Pre procedure LACEY II flow was noted. Post Procedure LACEY III flow     was present. Good runoff was present. The lesion was diagnosed as High     Risk (C). LCx:         Lesion on 1st Ob Marian: Proximal subsection. 95% stenosis 20 mm length     reduced to 0%. Pre procedure LACEY II flow was noted. Post Procedure LACEY     III flow was present. Good runoff was present. The lesion was diagnosed     as High Risk (C). Lesion on 2nd Ob Marian: Mid subsection. 90% stenosis 5 mm length reduced to     0%.  Pre procedure LACEY II flow was noted. Post Procedure LACEY III flow     was present. Good runoff was present. The lesion was diagnosed as     Moderate Risk (B). Coronary Tree  Dominance: Right         Conclusions:  Successful PTCA - JOHN PAUL OM1, OM2   Successful PTCA -JOHN PAUL proximal D1       Recommendations   Post stent protocol       Electronically signed on 12/21/2021 at 11:27 AM by:    Tk Alvarez MD  Fellow, [address]       I have reviewed the case / procedure with resident / fellow  I have examined the patient personally  Patient agree with treatment plan as discussed before, final arrangement based on my evaluation and exam.    Risk and benefit of procedure planned were explained in details. Procedure was performed by me personally, with all aspect of the procedure being done using standard protocol. Note was modified based on my own assessment and treatment.     Cristiane Perla MD  [facility]

## 2021-12-23 ENCOUNTER — CARE COORDINATION (OUTPATIENT)
Dept: CASE MANAGEMENT | Age: 66
End: 2021-12-23

## 2021-12-23 NOTE — CARE COORDINATION
[facility] Transitions Follow Up Call    2021    Patient: John Morley  Patient : 1955   MRN: <[id number]>  Reason for Admission: STEMI  Discharge Date: 21 RARS: Readmission Risk Score: 6.7 ( )         Spoke with: Caryl Queen she states she is doing well had stent placed on , left leg is bruised and sore from procedure. Denies chest pain, SOB, dizziness. Is eating and drinking well normal bowel and bladder elimination. Denies concerns. Care Transitions Follow Up Call    Needs to be reviewed by the provider   Additional needs identified to be addressed with provider: No  none             Method of communication with provider : none      Care Transition Nurse (CTN) contacted the patient by telephone to follow up after admission on . Verified name and  with patient as identifiers. Addressed changes since last contact: stent placement  Discussed follow-up appointments. If no appointment was previously scheduled, appointment scheduling offered: Yes. Is follow up appointment scheduled within 7 days of discharge? Yes. Advance Care Planning:   Does patient have an Advance Directive: reviewed and current, reviewed and needs to be updated, not on file; education provided, decision maker updated and referral to internal ACP facilitator. CTN reviewed discharge instructions, medical action plan and red flags with patient and discussed any barriers to care and/or understanding of plan of care after discharge. Discussed appropriate site of care based on symptoms and resources available to patient including: PCP, Specialist and When to call 911. The patient agrees to contact the PCP office for questions related to their healthcare. Patients top risk factors for readmission: lack of knowledge about disease  Interventions to address risk factors: Obtained and reviewed discharge summary and/or continuity of care documents          CTN provided contact information for future needs.  Plan for follow-up call in 10-14 days based on severity of symptoms and risk factors. Plan for next call: symptom management-routine        Care Transitions Subsequent and Final Call    Subsequent and Final Calls  Do you have any ongoing symptoms?: Yes  Onset of Patient-reported symptoms: Today  Patient-reported symptoms: Other  Interventions for patient-reported symptoms: Other  Have your medications changed?: No  Do you have any questions related to your medications?: No  Do you currently have any active services?: No  Do you have any needs or concerns that I can assist you with?: No  Identified Barriers: Other  Care Transitions Interventions  Other Interventions:            Follow Up  Future Appointments   Date Time Provider Carlos Galvin   6/21/2022  8:00 AM MD GEOFF Moreau DPP       Delio Richey LPN

## 2021-12-27 ENCOUNTER — TELEPHONE (OUTPATIENT)
Dept: CARDIOLOGY | Age: 66
End: 2021-12-27

## 2021-12-28 ENCOUNTER — HOSPITAL ENCOUNTER (OUTPATIENT)
Dept: LAB | Age: 66
Discharge: HOME | End: 2021-12-28
Payer: MEDICARE

## 2021-12-28 ENCOUNTER — TELEPHONE (OUTPATIENT)
Dept: FAMILY MEDICINE CLINIC | Age: 66
End: 2021-12-28

## 2021-12-28 DIAGNOSIS — E78.2 MIXED HYPERLIPIDEMIA: ICD-10-CM

## 2021-12-28 DIAGNOSIS — R73.09 ELEVATED GLUCOSE: Primary | ICD-10-CM

## 2021-12-28 DIAGNOSIS — R73.01 IMPAIRED FASTING BLOOD SUGAR: ICD-10-CM

## 2021-12-28 DIAGNOSIS — E11.9 TYPE 2 DIABETES MELLITUS WITHOUT COMPLICATION, WITHOUT LONG-TERM CURRENT USE OF INSULIN (HCC): ICD-10-CM

## 2021-12-28 DIAGNOSIS — I10 PRIMARY HYPERTENSION: ICD-10-CM

## 2021-12-28 LAB
ANION GAP SERPL CALCULATED.3IONS-SCNC: 11 MMOL/L (ref 9–17)
BNP INTERPRETATION: ABNORMAL
BUN BLDV-MCNC: 23 MG/DL (ref 8–23)
BUN/CREAT BLD: 29 (ref 9–20)
CALCIUM SERPL-MCNC: 9.4 MG/DL (ref 8.6–10.4)
CHLORIDE BLD-SCNC: 102 MMOL/L (ref 98–107)
CO2: 25 MMOL/L (ref 20–31)
CREAT SERPL-MCNC: 0.78 MG/DL (ref 0.5–0.9)
GFR AFRICAN AMERICAN: >60 ML/MIN
GFR NON-AFRICAN AMERICAN: >60 ML/MIN
GFR SERPL CREATININE-BSD FRML MDRD: ABNORMAL ML/MIN/{1.73_M2}
GFR SERPL CREATININE-BSD FRML MDRD: ABNORMAL ML/MIN/{1.73_M2}
GLUCOSE BLD-MCNC: 275 MG/DL (ref 70–99)
MAGNESIUM: 1.9 MG/DL (ref 1.6–2.6)
POTASSIUM SERPL-SCNC: 4.1 MMOL/L (ref 3.7–5.3)
PRO-BNP: 563 PG/ML
SODIUM BLD-SCNC: 138 MMOL/L (ref 135–144)

## 2021-12-28 PROCEDURE — 83880 ASSAY OF NATRIURETIC PEPTIDE: CPT

## 2021-12-28 PROCEDURE — 83735 ASSAY OF MAGNESIUM: CPT

## 2021-12-28 PROCEDURE — 80048 BASIC METABOLIC PNL TOTAL CA: CPT

## 2021-12-28 PROCEDURE — 36415 COLL VENOUS BLD VENIPUNCTURE: CPT

## 2021-12-28 NOTE — TELEPHONE ENCOUNTER
Patient at lab. Lab released Dr. Claudia Capellan order on accident and need them put back in. Thank you.

## 2022-01-05 ENCOUNTER — CARE COORDINATION (OUTPATIENT)
Dept: CASE MANAGEMENT | Age: 67
End: 2022-01-05

## 2022-01-24 ENCOUNTER — OFFICE VISIT (OUTPATIENT)
Dept: CARDIOLOGY | Age: 67
End: 2022-01-24
Payer: MEDICARE

## 2022-01-24 VITALS
SYSTOLIC BLOOD PRESSURE: 133 MMHG | DIASTOLIC BLOOD PRESSURE: 66 MMHG | BODY MASS INDEX: 31.39 KG/M2 | HEART RATE: 64 BPM | WEIGHT: 200 LBS | HEIGHT: 67 IN

## 2022-01-24 DIAGNOSIS — R60.0 LOWER EXTREMITY EDEMA: ICD-10-CM

## 2022-01-24 DIAGNOSIS — E11.9 TYPE 2 DIABETES MELLITUS WITHOUT COMPLICATION, UNSPECIFIED WHETHER LONG TERM INSULIN USE (HCC): ICD-10-CM

## 2022-01-24 DIAGNOSIS — E78.2 MIXED HYPERLIPIDEMIA: ICD-10-CM

## 2022-01-24 DIAGNOSIS — I21.3 ST ELEVATION MYOCARDIAL INFARCTION (STEMI), UNSPECIFIED ARTERY (HCC): Primary | ICD-10-CM

## 2022-01-24 DIAGNOSIS — R07.9 CHEST PAIN, UNSPECIFIED TYPE: ICD-10-CM

## 2022-01-24 DIAGNOSIS — I15.8 OTHER SECONDARY HYPERTENSION: ICD-10-CM

## 2022-01-24 PROCEDURE — 99214 OFFICE O/P EST MOD 30 MIN: CPT | Performed by: INTERNAL MEDICINE

## 2022-01-24 PROCEDURE — 93010 ELECTROCARDIOGRAM REPORT: CPT | Performed by: INTERNAL MEDICINE

## 2022-01-24 PROCEDURE — 93005 ELECTROCARDIOGRAM TRACING: CPT | Performed by: INTERNAL MEDICINE

## 2022-01-24 RX ORDER — LISINOPRIL 10 MG/1
10 TABLET ORAL DAILY
Qty: 90 TABLET | Refills: 1 | Status: SHIPPED | OUTPATIENT
Start: 2022-01-24 | End: 2022-04-26

## 2022-01-24 NOTE — PROGRESS NOTES
Cardiology Consultation/Follow Up. [id number] [facility] [address]  1955  [id number]    Today: 1/24/22    CC: Patient is here for follow up for CAD, STEMI, s/p PCI. HPI:   Vangie Anaya is here for follow up for CAD, STEMI, s/p PCI. Was having chest pain, pressure. Found to have STEMI inferiorly. S/p PCI to RCA. Then staged PCI to OM and Dx. Denies any sob, orthopnea, pnd, le edema, palpitations. Occasional twinge in chest, lasts few minutes/seconds. Has had some LE edema. Was sent home with lasix, but has not been using. Known to us on 12/21 for:  Assessment:   1. Inferior STEMI, presented with chest pain on-going for 2 days  2. S/p PTCA-JOHN PAUL to mid RCA. Residual disease in D1, Om1 and Om2  3. S/p Staged PCI to D1, Om1, Om2 on 12/21  4. HTN  5. HL  6. H/o renal cell cancer  7. LVEF 48%    Past Medical:  Past Medical History:   Diagnosis Date    CAD (coronary artery disease) 12/03/2021    MI, stents    Cancer [facility]) 2006    Right Breast cancer    Cervical radiculopathy     DDD (degenerative disc disease)     cervical    GERD (gastroesophageal reflux disease)     Hyperlipidemia     Hypertension     Impaired fasting blood sugar     Kidney stone     Myopia with astigmatism and presbyopia     Obesity     Osteopenia     Renal cell carcinoma ([facility] [address] 06/10/13    right kidney: clear cell type. Corine grade 2 of 4.  2.5cm limited to kidney    Stenosis of cervical spine with myelopathy (HCC)     Stress incontinence     Wears glasses        Past Surgical:  Past Surgical History:   Procedure Laterality Date    BLADDER SUSPENSION  2011    incontinence    BREAST BIOPSY Right 11/20/2007    wire localization     CERVICAL SPINE SURGERY  05/11/2018     ANTERIOR C4-5, C5-6 ARTHROPLASTY, (ONE PLATE AND FOUR SCREWS REMOVED C6-C7 AND DISCARDED    COLONOSCOPY  05/19/2008    COLONOSCOPY  05/03/2017    [id number]    CORONARY ANGIOPLASTY  12/03/2021    CORONARY ANGIOPLASTY  12/21/2021    CORONARY ANGIOPLASTY WITH STENT PLACEMENT  12/03/2021    CYSTOURETHROSCOPY Bilateral 09/02/2021    ENDOSCOPY, COLON, DIAGNOSTIC      HYSTERECTOMY, TOTAL ABDOMINAL  2011    with bladder lift    MASTECTOMY Bilateral 12/11/2007    lymphnodes were also removed    NECK SURGERY  06/30/2010    cervical 6-7 microdiscectomy. Dr. Susie Perera Right 06/10/2013    limited to kidney    [place] OFFICE/OUTPT VISIT,PROCEDURE ONLY N/A 05/11/2018    ANTERIOR C4-5, C5-6 ARTHROPLASTY WITH , SUPINE POSITION, MICROSCOPE, C-ARM, MEDTRONIC(REP NOTIFIED), EVOKES; (ONE PLATE AND FOUR SCREWS REMOVED C6-C7 AND DISCARDED) performed by Ailny Vasques DO at [facility] [address] Left 05/30/2019    TUBAL LIGATION Bilateral 1988    UPPER GASTROINTESTINAL ENDOSCOPY  01/12/2010    normal.  Dr. Anjana Doss EXTRACTION         Family History:  Family History   Problem Relation Age of Onset    High Blood Pressure Mother     Dementia Mother     Cancer Mother         colon cancer    High Blood Pressure Father     Diabetes Father     Heart Disease Father     High Blood Pressure Brother     Cancer Brother         lip    Cancer Maternal Grandmother         breast cancer    Heart Disease Paternal Grandmother     Diabetes Paternal Grandfather      Social History:  Social History     Socioeconomic History    Marital status:      Spouse name: None    Number of children: None    Years of education: None    Highest education level: None   Occupational History    None   Tobacco Use    Smoking status: Never Smoker    Smokeless tobacco: Never Used    Tobacco comment: kandis 7/29/15   Vaping Use    Vaping Use: Never used   Substance and Sexual Activity    Alcohol use:  Yes     Alcohol/week: 0.0 standard drinks     Comment: rarely    Drug use: No    Sexual activity: None   Other Topics Concern    None   Social History Narrative    None     Social Determinants of Health     Financial Resource Strain:     Difficulty of Paying Living Expenses: Not on file   Food Insecurity:     Worried About Running Out of Food in the Last Year: Not on file    Bradley of Food in the Last Year: Not on file   Transportation Needs:     Lack of Transportation (Medical): Not on file    Lack of Transportation (Non-Medical): Not on file   Physical Activity:     Days of Exercise per Week: Not on file    Minutes of Exercise per Session: Not on file   Stress:     Feeling of Stress : Not on file   Social Connections:     Frequency of Communication with Friends and Family: Not on file    Frequency of Social Gatherings with Friends and Family: Not on file    Attends Buddhism Services: Not on file    Active Member of [address] [place] [address] or Organizations: Not on file    Attends Club or Organization Meetings: Not on file    Marital Status: Not on file   Intimate Partner Violence:     Fear of Current or Ex-Partner: Not on file    Emotionally Abused: Not on file    Physically Abused: Not on file    Sexually Abused: Not on file   Housing Stability:     Unable to Pay for Housing in the Last Year: Not on file    Number of [place] in the Last Year: Not on file    Unstable Housing in the Last Year: Not on file     REVIEW OF SYSTEMS:    · Constitutional: there has been no unanticipated weight loss. There's been No change in energy level, No change in activity level. · Eyes: No visual changes or diplopia. No scleral icterus. · ENT: No Headaches, hearing loss or vertigo. No mouth sores or sore throat. · Cardiovascular: AS HPI  · Respiratory: AS HPI  · Gastrointestinal: No abdominal pain, appetite loss, blood in stools. No change in bowel or bladder habits. · Genitourinary: No dysuria, trouble voiding, or hematuria. · Musculoskeletal:  No gait disturbance, No weakness or joint complaints. · Integumentary: No rash or pruritis.   · Neurological: No headache, diplopia, change in muscle strength, numbness or tingling. No change in gait, balance, coordination, mood, affect, memory, mentation, behavior. · Psychiatric: No new anxiety or depression. · Endocrine: No temperature intolerance. No excessive thirst, fluid intake, or urination. No tremor. · Hematologic/Lymphatic: No abnormal bruising or bleeding, blood clots or swollen lymph nodes. · Allergic/Immunologic: No nasal congestion or hives. Medications:  Outpatient Medications Marked as Taking for the 1/24/22 encounter (Office Visit) with Romulo Jiménez,    Medication Sig Dispense Refill    lisinopril (PRINIVIL;ZESTRIL) 5 MG tablet Take 5 mg by mouth daily      amLODIPine (NORVASC) 5 MG tablet Take 5 mg by mouth nightly      allopurinol (ZYLOPRIM) 300 MG tablet Take 300 mg by mouth daily      ticagrelor (BRILINTA) 90 MG TABS tablet Take 1 tablet by mouth 2 times daily 60 tablet 3    atorvastatin (LIPITOR) 40 MG tablet Take 1 tablet by mouth nightly 30 tablet 3    aspirin 81 MG chewable tablet Take 1 tablet by mouth daily 30 tablet 3    metoprolol tartrate (LOPRESSOR) 50 MG tablet Take 1 tablet by mouth 2 times daily 60 tablet 3    furosemide (LASIX) 20 MG tablet Take 1 tablet by mouth as needed (for weight gain > 30 pounds) 60 tablet 3    omeprazole (PRILOSEC) 20 MG delayed release capsule TAKE 1 CAPSULE DAILY (Patient taking differently: 20 mg Daily TAKE 1 CAPSULE DAILY   Patient taking PRN) 90 capsule 3    glimepiride (AMARYL) 2 MG tablet Take 1 tablet by mouth twice daily with meals 180 tablet 3    nitroGLYCERIN (NITROSTAT) 0.4 MG SL tablet Place 1 tablet under the tongue every 5 minutes as needed for Chest pain 25 tablet 0      Physical Exam:   Vitals: /66   Pulse 64   Ht 5' 7\" (1.702 m)   Wt 200 lb (90.7 kg)   LMP  (LMP Unknown)   BMI 31.32 kg/m²   General appearance: alert and cooperative with exam  HEENT: Head: Normocephalic, no lesions, without obvious abnormality.   Neck: no carotid bruit, no JVD  Lungs: clear to auscultation bilaterally  Heart: regular rate and rhythm, S1, S2 normal, no Murmur  Abdomen: soft, non-tender; bowel sounds normal; no masses,  no organomegaly  Extremities: no site injection hematoma, extremities normal, atraumatic, no cyanosis. trace edema  Neurologic: Mental status: Alert, oriented, thought content appropriate    Labs:  Lab Results   Component Value Date    CHOL 195 04/09/2021    TRIG 170 (H) 04/09/2021    HDL 47 04/09/2021    LDLCHOLESTEROL 114 04/09/2021    VLDL NOT REPORTED (H) 04/09/2021    CHOLHDLRATIO 4.1 04/09/2021       Lab Results   Component Value Date     12/28/2021    K 4.1 12/28/2021     12/28/2021    CO2 25 12/28/2021    BUN 23 12/28/2021    CREATININE 0.78 12/28/2021    GLUCOSE 275 (H) 12/28/2021    CALCIUM 9.4 12/28/2021    PROT 7.3 04/09/2021    LABALBU 4.3 04/09/2021    BILITOT 0.36 04/09/2021    ALKPHOS 125 (H) 04/09/2021    AST 14 04/09/2021    ALT 17 04/09/2021    LABGLOM >60 12/28/2021    GFRAA >60 12/28/2021     EKG:   Sinus  Rhythm  -First degree A-V block  - occasional ectopic ventricular beat   Elis = 244  -RSR(V1) -nondiagnostic.    -Left atrial enlargement.    -Anterolateral ST-elevation -repolarization variant.    -Old Inferior infarct with TWI- progression of her MI    ECHO:   Results for orders placed or performed during the hospital encounter of 12/03/21   ECHO Complete 2D W Doppler W Color  Global left ventricular systolic function is normal. Calculated ejection  fraction 48% by Sequeira's method. Visually estimated EF 50-55%. Moderate concentric left ventricular hypertrophy. No significant valvular regurgitation or stenosis seen. Limited Echo 12/6/21:  Left ventricle is normal in size, global left ventricular systolic function  is normal.  Calculated ejection fraction is 55%. Mild to moderate left ventricular hypertrophy. Aortic valve is trileaflet.   No evidence of aortic insufficiency or stenosis (visually.)  Mitral valve sclerosis without stenosis. Trivial mitral regurgitation. Normal tricuspid valve leaflets. No tricuspid regurgitation was seen. No significant pericardial effusion is seen. CATH 12/3/21:   Multivessel CAD   Acute occlusion of the mid RCA, required PTCA -JOHN PAUL   Residual disease in branches, D1, Om1 and OM2. Lower normal LV function     Cath 12/21/21:   Procedure Summary   Successful PTCA - JOHN PAUL OM1, OM2   Successful PTCA -JOHN PAUL proximal D1     Past Medical and Surgical History, Problem List, Allergies, Medications, Labs, Imaging, all reviewed extensively in EMR and with the patient. Assessment:  - MV CAD - presented with Inferior STEMI 12/21- s/p PCI to mRCA, then staged PCI to D1/Om1/Om2  - Trace LE edema- ? Due to norvasc  - HTN  - DL  - Preserved LVEF Echo  - H/o Renal Ca  - H/o DM2  - Abnormal ECG- likely progression of her MI  - Chest twinge- lasts few seconds- occasionally 1 minute- will monitor    Plan:  - continue DAPT  - continue statin  - stop norvasc due to LE edema  - use lasix PRN  - increase lisinopril  - continue BB  - referral to cardiac rehab (she would like to do it [place]- were she will be living for next few months)  - go to ER if any CP that doesn't resolve spontaneously    The patient is to continue heart healthy diet, weight loss and exercise as tolerated. Patient's medications and side effects were discussed. Medication refills were provided if needed. Follow up appointment timing was discussed. All questions and concerns were addressed to patient's satisfaction. The patient is to follow up in 6 months or sooner if necessary. Thank you for allowing me to participate in the care of this patient, please do not hesitate to call if you have any questions. Beverley Sullivan DO, [facility], [address], [address], [place] [address] [facility]  [facility] [employer]  [phone]

## 2022-04-18 RX ORDER — IBANDRONATE SODIUM 150 MG/1
TABLET, FILM COATED ORAL
Qty: 12 TABLET | Refills: 3 | Status: SHIPPED | OUTPATIENT
Start: 2022-04-18

## 2022-04-26 RX ORDER — LISINOPRIL 40 MG/1
40 TABLET ORAL DAILY
Qty: 90 TABLET | Refills: 3 | Status: SHIPPED | OUTPATIENT
Start: 2022-04-26 | End: 2022-06-06 | Stop reason: SDUPTHER

## 2022-05-05 ENCOUNTER — TELEPHONE (OUTPATIENT)
Dept: CARDIOLOGY | Age: 67
End: 2022-05-05

## 2022-05-05 RX ORDER — ATORVASTATIN CALCIUM 40 MG/1
40 TABLET, FILM COATED ORAL NIGHTLY
Qty: 30 TABLET | Refills: 3 | Status: SHIPPED | OUTPATIENT
Start: 2022-05-05 | End: 2022-05-13 | Stop reason: SDUPTHER

## 2022-05-05 NOTE — TELEPHONE ENCOUNTER
Lipitor 40mg sent to [facility] for 90 days. The patient is completely out of the medication.      Last Appt:  1/24/2022  Next Appt:   6/27/2022  Med verified in Epic

## 2022-05-13 RX ORDER — ATORVASTATIN CALCIUM 40 MG/1
40 TABLET, FILM COATED ORAL NIGHTLY
Qty: 90 TABLET | Refills: 3 | Status: SHIPPED | OUTPATIENT
Start: 2022-05-13 | End: 2022-06-06 | Stop reason: SDUPTHER

## 2022-05-13 NOTE — TELEPHONE ENCOUNTER
Last Appt:  1/24/2022  Next Appt:   6/27/2022  Med verified in [address]    Fax received requesting refills of atorvastatin 40 mg daily to [facility].

## 2022-06-06 ENCOUNTER — OFFICE VISIT (OUTPATIENT)
Dept: CARDIOLOGY | Age: 67
End: 2022-06-06
Payer: MEDICARE

## 2022-06-06 VITALS
DIASTOLIC BLOOD PRESSURE: 83 MMHG | SYSTOLIC BLOOD PRESSURE: 171 MMHG | HEART RATE: 69 BPM | BODY MASS INDEX: 31.71 KG/M2 | HEIGHT: 67 IN | WEIGHT: 202 LBS

## 2022-06-06 DIAGNOSIS — E11.9 TYPE 2 DIABETES MELLITUS WITHOUT COMPLICATION, UNSPECIFIED WHETHER LONG TERM INSULIN USE (HCC): ICD-10-CM

## 2022-06-06 DIAGNOSIS — I21.3 ST ELEVATION MYOCARDIAL INFARCTION (STEMI), UNSPECIFIED ARTERY (HCC): Primary | ICD-10-CM

## 2022-06-06 DIAGNOSIS — I15.8 OTHER SECONDARY HYPERTENSION: ICD-10-CM

## 2022-06-06 DIAGNOSIS — E78.2 MIXED HYPERLIPIDEMIA: ICD-10-CM

## 2022-06-06 DIAGNOSIS — R07.9 CHEST PAIN, UNSPECIFIED TYPE: ICD-10-CM

## 2022-06-06 PROCEDURE — 93005 ELECTROCARDIOGRAM TRACING: CPT | Performed by: INTERNAL MEDICINE

## 2022-06-06 PROCEDURE — 99214 OFFICE O/P EST MOD 30 MIN: CPT | Performed by: INTERNAL MEDICINE

## 2022-06-06 PROCEDURE — 1123F ACP DISCUSS/DSCN MKR DOCD: CPT | Performed by: INTERNAL MEDICINE

## 2022-06-06 PROCEDURE — 93010 ELECTROCARDIOGRAM REPORT: CPT | Performed by: INTERNAL MEDICINE

## 2022-06-06 PROCEDURE — 99212 OFFICE O/P EST SF 10 MIN: CPT | Performed by: INTERNAL MEDICINE

## 2022-06-06 RX ORDER — ATORVASTATIN CALCIUM 40 MG/1
40 TABLET, FILM COATED ORAL NIGHTLY
Qty: 90 TABLET | Refills: 3 | Status: SHIPPED | OUTPATIENT
Start: 2022-06-06

## 2022-06-06 RX ORDER — FUROSEMIDE 20 MG/1
20 TABLET ORAL DAILY PRN
Qty: 90 TABLET | Refills: 3 | Status: SHIPPED | OUTPATIENT
Start: 2022-06-06

## 2022-06-06 RX ORDER — LISINOPRIL 40 MG/1
40 TABLET ORAL DAILY
Qty: 90 TABLET | Refills: 3 | Status: SHIPPED | OUTPATIENT
Start: 2022-06-06

## 2022-06-06 RX ORDER — ISOSORBIDE MONONITRATE 30 MG/1
30 TABLET, EXTENDED RELEASE ORAL DAILY
Qty: 90 TABLET | Refills: 1 | Status: SHIPPED | OUTPATIENT
Start: 2022-06-06

## 2022-06-06 RX ORDER — METOPROLOL TARTRATE 50 MG/1
50 TABLET, FILM COATED ORAL 2 TIMES DAILY
Qty: 180 TABLET | Refills: 3 | Status: CANCELLED | OUTPATIENT
Start: 2022-06-06

## 2022-06-06 RX ORDER — CEPHALEXIN 500 MG/1
500 CAPSULE ORAL 4 TIMES DAILY
Status: ON HOLD | COMMUNITY
End: 2022-10-24

## 2022-06-06 RX ORDER — FUROSEMIDE 20 MG/1
20 TABLET ORAL DAILY PRN
COMMUNITY
End: 2022-06-06 | Stop reason: SDUPTHER

## 2022-06-06 RX ORDER — CARVEDILOL 12.5 MG/1
12.5 TABLET ORAL 2 TIMES DAILY
Qty: 180 TABLET | Refills: 1 | Status: SHIPPED | OUTPATIENT
Start: 2022-06-06 | End: 2022-07-11

## 2022-06-06 NOTE — PROGRESS NOTES
Cardiology Consultation/Follow Up. [address] [facility] [address]  1955  [id number]    Today: 6/6/22    CC: Patient is here for follow up for CAD, STEMI, s/p PCI. HPI:   Caty Hylton is here for follow up for CAD, STEMI, s/p PCI. I was sent some documentation form [address] [facility] [address] cardiac rehab regarding high BP readings. Her BP there is 150-170s. At home some times . Doing 15 minutes each station at cardiac rehab. Has periodic chest pain / tightness / heaviness- usually at rest, never with therapy. Never needed nitro SL for it. Some times right sided R sided pinching pain. Denies any sob, orthopnea, pnd, le edema, palpitations. Past Medical:  Past Medical History:   Diagnosis Date    CAD (coronary artery disease) 12/03/2021    MI, stents    Cancer [facility]) 2006    Right Breast cancer    Cervical radiculopathy     DDD (degenerative disc disease)     cervical    GERD (gastroesophageal reflux disease)     Hyperlipidemia     Hypertension     Impaired fasting blood sugar     Kidney stone     Myopia with astigmatism and presbyopia     Obesity     Osteopenia     Renal cell carcinoma ([facility] [address] 06/10/13    right kidney: clear cell type. Corine grade 2 of 4.  2.5cm limited to kidney    Stenosis of cervical spine with myelopathy (HCC)     Stress incontinence     Wears glasses        Past Surgical:  Past Surgical History:   Procedure Laterality Date    BLADDER SUSPENSION  2011    incontinence    BREAST BIOPSY Right 11/20/2007    wire localization     CERVICAL SPINE SURGERY  05/11/2018     ANTERIOR C4-5, C5-6 ARTHROPLASTY, (ONE PLATE AND FOUR SCREWS REMOVED C6-C7 AND DISCARDED    COLONOSCOPY  05/19/2008    COLONOSCOPY  05/03/2017    [id number]    CORONARY ANGIOPLASTY  12/03/2021    CORONARY ANGIOPLASTY  12/21/2021    CORONARY ANGIOPLASTY WITH STENT PLACEMENT  12/03/2021    CYSTOURETHROSCOPY Bilateral 09/02/2021    ENDOSCOPY, COLON, DIAGNOSTIC      HYSTERECTOMY, TOTAL ABDOMINAL  2011    with bladder lift    MASTECTOMY Bilateral 12/11/2007    lymphnodes were also removed    NECK SURGERY  06/30/2010    cervical 6-7 microdiscectomy. Dr. Carson Norris Right 06/10/2013    limited to kidney    [place] OFFICE/OUTPT VISIT,PROCEDURE ONLY N/A 05/11/2018    ANTERIOR C4-5, C5-6 ARTHROPLASTY WITH , SUPINE POSITION, MICROSCOPE, C-ARM, MEDTRONIC(REP NOTIFIED), EVOKES; (ONE PLATE AND FOUR SCREWS REMOVED C6-C7 AND DISCARDED) performed by Daniel Palm DO at [facility] 176 Left 05/30/2019    TUBAL LIGATION Bilateral 1988    UPPER GASTROINTESTINAL ENDOSCOPY  01/12/2010    normal.  Dr. Aleja Frank EXTRACTION         Family History:  Family History   Problem Relation Age of Onset    High Blood Pressure Mother     Dementia Mother     Cancer Mother         colon cancer    High Blood Pressure Father     Diabetes Father     Heart Disease Father     High Blood Pressure Brother     Cancer Brother         lip    Cancer Maternal Grandmother         breast cancer    Heart Disease Paternal Grandmother     Diabetes Paternal Grandfather      Social History:  Social History     Socioeconomic History    Marital status:      Spouse name: None    Number of children: None    Years of education: None    Highest education level: None   Occupational History    None   Tobacco Use    Smoking status: Never Smoker    Smokeless tobacco: Never Used    Tobacco comment: kandis 7/29/15   Vaping Use    Vaping Use: Never used   Substance and Sexual Activity    Alcohol use:  Yes     Alcohol/week: 0.0 standard drinks     Comment: rarely    Drug use: No    Sexual activity: None   Other Topics Concern    None   Social History Narrative    None     Social Determinants of Health     Financial Resource Strain:     Difficulty of Paying Living Expenses: Not on file   Food Insecurity:     Worried About Running Out of Food in the Last Year: Not on file    Ran Out of Food in the Last Year: Not on file   Transportation Needs:     Lack of Transportation (Medical): Not on file    Lack of Transportation (Non-Medical): Not on file   Physical Activity:     Days of Exercise per Week: Not on file    Minutes of Exercise per Session: Not on file   Stress:     Feeling of Stress : Not on file   Social Connections:     Frequency of Communication with Friends and Family: Not on file    Frequency of Social Gatherings with Friends and Family: Not on file    Attends Confucianist Services: Not on file    Active Member of [address] [place] [address] or Organizations: Not on file    Attends Club or Organization Meetings: Not on file    Marital Status: Not on file   Intimate Partner Violence:     Fear of Current or Ex-Partner: Not on file    Emotionally Abused: Not on file    Physically Abused: Not on file    Sexually Abused: Not on file   Housing Stability:     Unable to Pay for Housing in the Last Year: Not on file    Number of [place] in the Last Year: Not on file    Unstable Housing in the Last Year: Not on file     REVIEW OF SYSTEMS:    · Constitutional: there has been no unanticipated weight loss. There's been No change in energy level, No change in activity level. · Eyes: No visual changes or diplopia. No scleral icterus. · ENT: No Headaches, hearing loss or vertigo. No mouth sores or sore throat. · Cardiovascular: AS HPI  · Respiratory: AS HPI  · Gastrointestinal: No abdominal pain, appetite loss, blood in stools. No change in bowel or bladder habits. · Genitourinary: No dysuria, trouble voiding, or hematuria. · Musculoskeletal:  No gait disturbance, No weakness or joint complaints. · Integumentary: No rash or pruritis. · Neurological: No headache, diplopia, change in muscle strength, numbness or tingling. No change in gait, balance, coordination, mood, affect, memory, mentation, behavior. · Psychiatric: No new anxiety or depression.   · Endocrine: No temperature intolerance. No excessive thirst, fluid intake, or urination. No tremor. · Hematologic/Lymphatic: No abnormal bruising or bleeding, blood clots or swollen lymph nodes. · Allergic/Immunologic: No nasal congestion or hives. Medications:  Outpatient Medications Marked as Taking for the 6/6/22 encounter (Office Visit) with Casie Rey DO   Medication Sig Dispense Refill    furosemide (LASIX) 20 MG tablet Take 20 mg by mouth daily as needed As need for weight gain (Edema)      cephALEXin (KEFLEX) 500 MG capsule Take 500 mg by mouth 4 times daily Take one hour prior to dental work      atorvastatin (LIPITOR) 40 MG tablet Take 1 tablet by mouth nightly 90 tablet 3    lisinopril (PRINIVIL;ZESTRIL) 40 MG tablet Take 1 tablet by mouth daily 90 tablet 3    ibandronate (BONIVA) 150 MG tablet TAKE AS INSTRUCTED BY YOUR PRESCRIBER 12 tablet 3    allopurinol (ZYLOPRIM) 300 MG tablet Take 300 mg by mouth daily      nitroGLYCERIN (NITROSTAT) 0.4 MG SL tablet Place 1 tablet under the tongue every 5 minutes as needed for Chest pain up to max of 3 total doses.  If no relief after 1 dose, call 911. 25 tablet 3    ticagrelor (BRILINTA) 90 MG TABS tablet Take 1 tablet by mouth 2 times daily 60 tablet 3    aspirin 81 MG chewable tablet Take 1 tablet by mouth daily 30 tablet 3    metoprolol tartrate (LOPRESSOR) 50 MG tablet Take 1 tablet by mouth 2 times daily 60 tablet 3    [DISCONTINUED] furosemide (LASIX) 20 MG tablet Take 1 tablet by mouth as needed (for weight gain > 30 pounds) 60 tablet 3    omeprazole (PRILOSEC) 20 MG delayed release capsule TAKE 1 CAPSULE DAILY (Patient taking differently: 20 mg Daily TAKE 1 CAPSULE DAILY   Patient taking PRN) 90 capsule 3    glimepiride (AMARYL) 2 MG tablet Take 1 tablet by mouth twice daily with meals 180 tablet 3      Physical Exam:   Vitals: BP (!) 171/83   Pulse 69   Ht 5' 7\" (1.702 m)   Wt 202 lb (91.6 kg)   LMP  (LMP Unknown)   BMI 31.64 kg/m²   General appearance: alert and cooperative with exam  HEENT: Head: Normocephalic, no lesions, without obvious abnormality. Neck: no carotid bruit, no JVD  Lungs: clear to auscultation bilaterally  Heart: regular rate and rhythm, S1, S2 normal, no Murmur  Abdomen: soft, non-tender; bowel sounds normal; no masses,  no organomegaly  Extremities: no site injection hematoma, extremities normal, atraumatic, no cyanosis. trace edema  Neurologic: Mental status: Alert, oriented, thought content appropriate    Labs:  Lab Results   Component Value Date    CHOL 195 04/09/2021    TRIG 170 (H) 04/09/2021    HDL 47 04/09/2021    LDLCHOLESTEROL 114 04/09/2021    VLDL NOT REPORTED (H) 04/09/2021    CHOLHDLRATIO 4.1 04/09/2021       Lab Results   Component Value Date     12/28/2021    K 4.1 12/28/2021     12/28/2021    CO2 25 12/28/2021    BUN 23 12/28/2021    CREATININE 0.78 12/28/2021    GLUCOSE 275 (H) 12/28/2021    CALCIUM 9.4 12/28/2021    PROT 7.3 04/09/2021    LABALBU 4.3 04/09/2021    BILITOT 0.36 04/09/2021    ALKPHOS 125 (H) 04/09/2021    AST 14 04/09/2021    ALT 17 04/09/2021    LABGLOM >60 12/28/2021    GFRAA >60 12/28/2021     EKG:   Sinus  Rhythm  -First degree A-V block   Elis = 226  -RSR(V1) -nondiagnostic.    -Left atrial enlargement.    -Old Inferior infarct    ECHO:   Results for orders placed or performed during the hospital encounter of 12/03/21   ECHO Complete 2D W Doppler W Color  Global left ventricular systolic function is normal. Calculated ejection  fraction 48% by Sequeira's method. Visually estimated EF 50-55%. Moderate concentric left ventricular hypertrophy. No significant valvular regurgitation or stenosis seen. Limited Echo 12/6/21:  Left ventricle is normal in size, global left ventricular systolic function  is normal.  Calculated ejection fraction is 55%. Mild to moderate left ventricular hypertrophy. Aortic valve is trileaflet.   No evidence of aortic insufficiency or stenosis (visually.)  Mitral valve sclerosis without stenosis. Trivial mitral regurgitation. Normal tricuspid valve leaflets. No tricuspid regurgitation was seen. No significant pericardial effusion is seen. CATH 12/3/21:   Multivessel CAD   Acute occlusion of the mid RCA, required PTCA -JOHN PAUL   Residual disease in branches, D1, Om1 and OM2. Lower normal LV function     Cath 12/21/21:   Procedure Summary   Successful PTCA - JOHN PAUL OM1, OM2   Successful PTCA -JOHN PAUL proximal D1     Past Medical and Surgical History, Problem List, Allergies, Medications, Labs, Imaging, all reviewed extensively in EMR and with the patient. Assessment:  - HTN- not well controlled  - Chest tightness / pressure- likely stable angina. Occurs at rest, never with exertion during her cardiac rehab. - MV CAD - presented with Inferior STEMI 12/21- s/p PCI to mRCA, then staged PCI to D1/Om1/Om2  - Trace LE edema- resolved off norvasc  - DL  - Preserved LVEF Echo  - H/o Renal Ca  - H/o DM    Plan:  - stop lopressor  - start coreg 12.5 bid (may need to increase to 25)  - add imdur  - she will monitor BP at home for next 1-2 weeks and continue BP checks at cardiac rehab, if not improved, will increase coreg 25 and consider adding norvasc  - if has recurrence of CP/Pressure, she is to use nitro x 3 q 5 minutes apart, if not resolved, needs to go to ER  - if requiring nitro, she needs to call us as well  - continue DAPT  - continue statin  - use lasix PRN  - on higher dose lisinopril  - continue BB    The patient is to continue heart healthy diet, weight loss and exercise as tolerated. Patient's medications and side effects were discussed. Medication refills were provided if needed. Follow up appointment timing was discussed. All questions and concerns were addressed to patient's satisfaction. The patient is to follow up in 1 months or sooner if necessary.      Thank you for allowing me to participate in the care of this patient, please do not hesitate to call if you have any questions. Cami Mays DO, [facility], [address], [address], [place] [address] [facility]  [facility], [facility]  [phone]

## 2022-07-11 ENCOUNTER — OFFICE VISIT (OUTPATIENT)
Dept: CARDIOLOGY | Age: 67
End: 2022-07-11
Payer: MEDICARE

## 2022-07-11 VITALS
HEART RATE: 72 BPM | BODY MASS INDEX: 31.58 KG/M2 | DIASTOLIC BLOOD PRESSURE: 82 MMHG | WEIGHT: 201.2 LBS | SYSTOLIC BLOOD PRESSURE: 135 MMHG | HEIGHT: 67 IN

## 2022-07-11 DIAGNOSIS — E78.2 MIXED HYPERLIPIDEMIA: ICD-10-CM

## 2022-07-11 DIAGNOSIS — I25.118 CORONARY ARTERY DISEASE OF NATIVE HEART WITH STABLE ANGINA PECTORIS, UNSPECIFIED VESSEL OR LESION TYPE (HCC): Primary | ICD-10-CM

## 2022-07-11 DIAGNOSIS — I15.8 OTHER SECONDARY HYPERTENSION: ICD-10-CM

## 2022-07-11 DIAGNOSIS — I21.3 ST ELEVATION MYOCARDIAL INFARCTION (STEMI), UNSPECIFIED ARTERY (HCC): ICD-10-CM

## 2022-07-11 DIAGNOSIS — Z98.61 CORONARY ANGIOPLASTY STATUS: ICD-10-CM

## 2022-07-11 PROCEDURE — 99214 OFFICE O/P EST MOD 30 MIN: CPT | Performed by: INTERNAL MEDICINE

## 2022-07-11 PROCEDURE — 1123F ACP DISCUSS/DSCN MKR DOCD: CPT | Performed by: INTERNAL MEDICINE

## 2022-07-11 PROCEDURE — 99213 OFFICE O/P EST LOW 20 MIN: CPT | Performed by: INTERNAL MEDICINE

## 2022-07-11 RX ORDER — CARVEDILOL 25 MG/1
25 TABLET ORAL 2 TIMES DAILY
Qty: 180 TABLET | Refills: 3 | Status: SHIPPED | OUTPATIENT
Start: 2022-07-11

## 2022-07-11 NOTE — PROGRESS NOTES
Cardiology Consultation/Follow Up. [address] [facility] [address]  1955  [id number]    Today: 7/11/22    CC: Patient is here for follow up for CAD, STEMI, s/p PCI. HPI:   Julio Calderon is here for follow up for CAD, STEMI, s/p PCI. At last visit we stopped metoprolol started coreg. She initially had head aches. Now Headaches resolved. Denies any cp. Had single episode of SOB (felt she had to take a deep breath). BP still a bit higher even at cardiac rehab, but improved. She is now done with cardiac rehab. Was doing 15 minutes each station at cardiac rehab. Past Medical:  Past Medical History:   Diagnosis Date    CAD (coronary artery disease) 12/03/2021    MI, stents    Cancer [facility]) 2006    Right Breast cancer    Cervical radiculopathy     DDD (degenerative disc disease)     cervical    GERD (gastroesophageal reflux disease)     Hyperlipidemia     Hypertension     Impaired fasting blood sugar     Kidney stone     Myopia with astigmatism and presbyopia     Obesity     Osteopenia     Renal cell carcinoma ([facility] [address] 06/10/13    right kidney: clear cell type. Corine grade 2 of 4.  2.5cm limited to kidney    Stenosis of cervical spine with myelopathy (HCC)     Stress incontinence     Wears glasses        Past Surgical:  Past Surgical History:   Procedure Laterality Date    BLADDER SUSPENSION  2011    incontinence    BREAST BIOPSY Right 11/20/2007    wire localization     CERVICAL SPINE SURGERY  05/11/2018     ANTERIOR C4-5, C5-6 ARTHROPLASTY, (ONE PLATE AND FOUR SCREWS REMOVED C6-C7 AND DISCARDED    COLONOSCOPY  05/19/2008    COLONOSCOPY  05/03/2017    [id number]    CORONARY ANGIOPLASTY  12/03/2021    CORONARY ANGIOPLASTY  12/21/2021    CORONARY ANGIOPLASTY WITH STENT PLACEMENT  12/03/2021    CYSTOURETHROSCOPY Bilateral 09/02/2021    ENDOSCOPY, COLON, DIAGNOSTIC      HYSTERECTOMY, TOTAL ABDOMINAL (CERVIX REMOVED)  2011    with bladder lift    MASTECTOMY Bilateral 12/11/2007    lymphnodes were also removed    NECK SURGERY  06/30/2010    cervical 6-7 microdiscectomy. Dr. Ivory Fleischer Right 06/10/2013    limited to kidney    [place] OFFICE/OUTPT VISIT,PROCEDURE ONLY N/A 05/11/2018    ANTERIOR C4-5, C5-6 ARTHROPLASTY WITH , SUPINE POSITION, MICROSCOPE, C-ARM, MEDTRONIC(REP NOTIFIED), EVOKES; (ONE PLATE AND FOUR SCREWS REMOVED C6-C7 AND DISCARDED) performed by Joe Olson DO at [facility] 176 Left 05/30/2019    TUBAL LIGATION Bilateral 1988    UPPER GASTROINTESTINAL ENDOSCOPY  01/12/2010    normal.  Dr. Gracie Gan EXTRACTION         Family History:  Family History   Problem Relation Age of Onset    High Blood Pressure Mother     Dementia Mother     Cancer Mother         colon cancer    High Blood Pressure Father     Diabetes Father     Heart Disease Father     High Blood Pressure Brother     Cancer Brother         lip    Cancer Maternal Grandmother         breast cancer    Heart Disease Paternal Grandmother     Diabetes Paternal Grandfather      Social History:  Social History     Socioeconomic History    Marital status:      Spouse name: None    Number of children: None    Years of education: None    Highest education level: None   Occupational History    None   Tobacco Use    Smoking status: Never Smoker    Smokeless tobacco: Never Used    Tobacco comment: kandis 7/29/15   Vaping Use    Vaping Use: Never used   Substance and Sexual Activity    Alcohol use:  Yes     Alcohol/week: 0.0 standard drinks     Comment: rarely    Drug use: No    Sexual activity: None   Other Topics Concern    None   Social History Narrative    None     Social Determinants of Health     Financial Resource Strain:     Difficulty of Paying Living Expenses: Not on file   Food Insecurity:     Worried About Running Out of Food in the Last Year: Not on file    [place] of Food in the Last Year: Not on file   Transportation Needs:     Lack of Transportation (Medical): Not on file    Lack of Transportation (Non-Medical): Not on file   Physical Activity:     Days of Exercise per Week: Not on file    Minutes of Exercise per Session: Not on file   Stress:     Feeling of Stress : Not on file   Social Connections:     Frequency of Communication with Friends and Family: Not on file    Frequency of Social Gatherings with Friends and Family: Not on file    Attends Yazidism Services: Not on file    Active Member of [address] [place] [address] or Organizations: Not on file    Attends Club or Organization Meetings: Not on file    Marital Status: Not on file   Intimate Partner Violence:     Fear of Current or Ex-Partner: Not on file    Emotionally Abused: Not on file    Physically Abused: Not on file    Sexually Abused: Not on file   Housing Stability:     Unable to Pay for Housing in the Last Year: Not on file    Number of [place] in the Last Year: Not on file    Unstable Housing in the Last Year: Not on file     REVIEW OF SYSTEMS:    · Constitutional: there has been no unanticipated weight loss. There's been No change in energy level, No change in activity level. · Eyes: No visual changes or diplopia. No scleral icterus. · ENT: No Headaches, hearing loss or vertigo. No mouth sores or sore throat. · Cardiovascular: AS HPI  · Respiratory: AS HPI  · Gastrointestinal: No abdominal pain, appetite loss, blood in stools. No change in bowel or bladder habits. · Genitourinary: No dysuria, trouble voiding, or hematuria. · Musculoskeletal:  No gait disturbance, No weakness or joint complaints. · Integumentary: No rash or pruritis. · Neurological: No headache, diplopia, change in muscle strength, numbness or tingling. No change in gait, balance, coordination, mood, affect, memory, mentation, behavior. · Psychiatric: No new anxiety or depression. · Endocrine: No temperature intolerance.  No excessive thirst, fluid intake, or urination. No tremor. · Hematologic/Lymphatic: No abnormal bruising or bleeding, blood clots or swollen lymph nodes. · Allergic/Immunologic: No nasal congestion or hives. Medications:  Outpatient Medications Marked as Taking for the 7/11/22 encounter (Office Visit) with Tia Golden DO   Medication Sig Dispense Refill    cephALEXin (KEFLEX) 500 MG capsule Take 500 mg by mouth 4 times daily Take one hour prior to dental work      furosemide (LASIX) 20 MG tablet Take 1 tablet by mouth daily as needed (edema/weight gain) As need for weight gain (Edema) 90 tablet 3    atorvastatin (LIPITOR) 40 MG tablet Take 1 tablet by mouth nightly 90 tablet 3    lisinopril (PRINIVIL;ZESTRIL) 40 MG tablet Take 1 tablet by mouth daily 90 tablet 3    ticagrelor (BRILINTA) 90 MG TABS tablet Take 1 tablet by mouth 2 times daily 180 tablet 3    carvedilol (COREG) 12.5 MG tablet Take 1 tablet by mouth 2 times daily 180 tablet 1    isosorbide mononitrate (IMDUR) 30 MG extended release tablet Take 1 tablet by mouth daily 90 tablet 1    ibandronate (BONIVA) 150 MG tablet TAKE AS INSTRUCTED BY YOUR PRESCRIBER 12 tablet 3    allopurinol (ZYLOPRIM) 300 MG tablet Take 300 mg by mouth daily      nitroGLYCERIN (NITROSTAT) 0.4 MG SL tablet Place 1 tablet under the tongue every 5 minutes as needed for Chest pain up to max of 3 total doses.  If no relief after 1 dose, call 911. 25 tablet 3    aspirin 81 MG chewable tablet Take 1 tablet by mouth daily 30 tablet 3    omeprazole (PRILOSEC) 20 MG delayed release capsule TAKE 1 CAPSULE DAILY (Patient taking differently: 20 mg Daily TAKE 1 CAPSULE DAILY   Patient taking PRN) 90 capsule 3    glimepiride (AMARYL) 2 MG tablet Take 1 tablet by mouth twice daily with meals 180 tablet 3      Physical Exam:   Vitals: /82   Pulse 72   Ht 5' 7\" (1.702 m)   Wt 201 lb 3.2 oz (91.3 kg)   LMP  (LMP Unknown)   BMI 31.51 kg/m²   General appearance: alert and cooperative with exam  HEENT: Head: Normocephalic, no lesions, without obvious abnormality. Neck: no carotid bruit, no JVD  Lungs: clear to auscultation bilaterally  Heart: regular rate and rhythm, S1, S2 normal, no Murmur  Abdomen: soft, non-tender; bowel sounds normal; no masses,  no organomegaly  Extremities: no site injection hematoma, extremities normal, atraumatic, no cyanosis. trace edema  Neurologic: Mental status: Alert, oriented, thought content appropriate    Labs:  Lab Results   Component Value Date    CHOL 195 04/09/2021    TRIG 170 (H) 04/09/2021    HDL 47 04/09/2021    LDLCHOLESTEROL 114 04/09/2021    VLDL NOT REPORTED (H) 04/09/2021    CHOLHDLRATIO 4.1 04/09/2021       Lab Results   Component Value Date     12/28/2021    K 4.1 12/28/2021     12/28/2021    CO2 25 12/28/2021    BUN 23 12/28/2021    CREATININE 0.78 12/28/2021    GLUCOSE 275 (H) 12/28/2021    CALCIUM 9.4 12/28/2021    PROT 7.3 04/09/2021    LABALBU 4.3 04/09/2021    BILITOT 0.36 04/09/2021    ALKPHOS 125 (H) 04/09/2021    AST 14 04/09/2021    ALT 17 04/09/2021    LABGLOM >60 12/28/2021    GFRAA >60 12/28/2021     EKG:   Sinus  Rhythm  -First degree A-V block   Elis = 226  -RSR(V1) -nondiagnostic.    -Left atrial enlargement.    -Old Inferior infarct    ECHO:   Results for orders placed or performed during the hospital encounter of 12/03/21   ECHO Complete 2D W Doppler W Color  Global left ventricular systolic function is normal. Calculated ejection  fraction 48% by Sequeira's method. Visually estimated EF 50-55%. Moderate concentric left ventricular hypertrophy. No significant valvular regurgitation or stenosis seen. Limited Echo 12/6/21:  Left ventricle is normal in size, global left ventricular systolic function  is normal.  Calculated ejection fraction is 55%. Mild to moderate left ventricular hypertrophy. Aortic valve is trileaflet. No evidence of aortic insufficiency or stenosis (visually.)  Mitral valve sclerosis without stenosis.   Trivial mitral regurgitation. Normal tricuspid valve leaflets. No tricuspid regurgitation was seen. No significant pericardial effusion is seen. CATH 12/3/21:   Multivessel CAD   Acute occlusion of the mid RCA, required PTCA -JOHN PAUL   Residual disease in branches, D1, Om1 and OM2. Lower normal LV function     Cath 12/21/21:   Procedure Summary   Successful PTCA - JOHN PAUL OM1, OM2   Successful PTCA -JOHN PAUL proximal D1     Past Medical and Surgical History, Problem List, Allergies, Medications, Labs, Imaging, all reviewed extensively in EMR and with the patient. Assessment:  - HTN- improved but not quite at goal.  - Chest tightness / pressure- resolved  - SOB- occasionally at rest, feels has to take deep breath- likely due to brilinta  - MV CAD - presented with Inferior STEMI 12/21- s/p PCI to mRCA, then staged PCI to D1/Om1/Om2  - Trace LE edema- resolved off norvasc  - DL  - Preserved LVEF Echo  - H/o Renal Ca  - H/o DM    Plan:  - increase coreg 25 bid  - continue imdur  - she will monitor BP at home for next few months- Call us if not improved  - continue DAPT  - continue statin  - use lasix PRN  - on higher dose lisinopril  - check FLP/AST/ALT    The patient is to continue heart healthy diet, weight loss and exercise as tolerated. Patient's medications and side effects were discussed. Medication refills were provided if needed. Follow up appointment timing was discussed. All questions and concerns were addressed to patient's satisfaction. The patient is to follow up in 3 months or sooner if necessary. Thank you for allowing me to participate in the care of this patient, please do not hesitate to call if you have any questions. Lavella Burkitt, DO, [facility] [place], [address], [address], [place] [address] [facility]  [facility]  [phone]

## 2022-08-01 ENCOUNTER — HOSPITAL ENCOUNTER (OUTPATIENT)
Dept: LAB | Age: 67
Discharge: HOME | End: 2022-08-01
Payer: MEDICARE

## 2022-08-01 DIAGNOSIS — R73.09 ELEVATED GLUCOSE: Primary | ICD-10-CM

## 2022-08-01 DIAGNOSIS — I21.3 ST ELEVATION MYOCARDIAL INFARCTION (STEMI), UNSPECIFIED ARTERY (HCC): ICD-10-CM

## 2022-08-01 DIAGNOSIS — I10 PRIMARY HYPERTENSION: ICD-10-CM

## 2022-08-01 DIAGNOSIS — I15.8 OTHER SECONDARY HYPERTENSION: ICD-10-CM

## 2022-08-01 DIAGNOSIS — E78.2 MIXED HYPERLIPIDEMIA: ICD-10-CM

## 2022-08-01 DIAGNOSIS — R73.09 ELEVATED GLUCOSE: ICD-10-CM

## 2022-08-01 DIAGNOSIS — R73.01 IMPAIRED FASTING BLOOD SUGAR: ICD-10-CM

## 2022-08-01 LAB
ABSOLUTE EOS #: 0.19 K/UL (ref 0–0.44)
ABSOLUTE IMMATURE GRANULOCYTE: 0.06 K/UL (ref 0–0.3)
ABSOLUTE LYMPH #: 2.44 K/UL (ref 1.1–3.7)
ABSOLUTE MONO #: 0.54 K/UL (ref 0.1–1.2)
ALBUMIN SERPL-MCNC: 4 G/DL (ref 3.5–5.2)
ALBUMIN/GLOBULIN RATIO: 1.5 (ref 1–2.5)
ALP BLD-CCNC: 124 U/L (ref 35–104)
ALT SERPL-CCNC: 18 U/L (ref 5–33)
ALT SERPL-CCNC: 18 U/L (ref 5–33)
ANION GAP SERPL CALCULATED.3IONS-SCNC: 10 MMOL/L (ref 9–17)
AST SERPL-CCNC: 15 U/L
AST SERPL-CCNC: 15 U/L
BASOPHILS # BLD: 0 % (ref 0–2)
BASOPHILS ABSOLUTE: 0.03 K/UL (ref 0–0.2)
BILIRUB SERPL-MCNC: 0.6 MG/DL (ref 0.3–1.2)
BUN BLDV-MCNC: 14 MG/DL (ref 8–23)
BUN/CREAT BLD: 20 (ref 9–20)
CALCIUM SERPL-MCNC: 9.3 MG/DL (ref 8.6–10.4)
CHLORIDE BLD-SCNC: 103 MMOL/L (ref 98–107)
CHOLESTEROL, FASTING: 141 MG/DL
CHOLESTEROL/HDL RATIO: 3.4
CHOLESTEROL/HDL RATIO: 3.4
CHOLESTEROL: 141 MG/DL
CO2: 26 MMOL/L (ref 20–31)
CREAT SERPL-MCNC: 0.71 MG/DL (ref 0.5–0.9)
EOSINOPHILS RELATIVE PERCENT: 2 % (ref 1–4)
GFR AFRICAN AMERICAN: >60 ML/MIN
GFR NON-AFRICAN AMERICAN: >60 ML/MIN
GFR SERPL CREATININE-BSD FRML MDRD: ABNORMAL ML/MIN/{1.73_M2}
GLUCOSE BLD-MCNC: 253 MG/DL (ref 70–99)
HCT VFR BLD CALC: 39.5 % (ref 36.3–47.1)
HDLC SERPL-MCNC: 41 MG/DL
HDLC SERPL-MCNC: 41 MG/DL
HEMOGLOBIN: 13.6 G/DL (ref 11.9–15.1)
IMMATURE GRANULOCYTES: 1 %
LDL CHOLESTEROL: 72 MG/DL (ref 0–130)
LDL CHOLESTEROL: 72 MG/DL (ref 0–130)
LYMPHOCYTES # BLD: 31 % (ref 24–43)
MCH RBC QN AUTO: 28.4 PG (ref 25.2–33.5)
MCHC RBC AUTO-ENTMCNC: 34.4 G/DL (ref 25.2–33.5)
MCV RBC AUTO: 82.5 FL (ref 82.6–102.9)
MONOCYTES # BLD: 7 % (ref 3–12)
NRBC AUTOMATED: 0 PER 100 WBC
PDW BLD-RTO: 13.5 % (ref 11.8–14.4)
PLATELET # BLD: 235 K/UL (ref 138–453)
PMV BLD AUTO: 10.1 FL (ref 8.1–13.5)
POTASSIUM SERPL-SCNC: 4.2 MMOL/L (ref 3.7–5.3)
RBC # BLD: 4.79 M/UL (ref 3.95–5.11)
RBC # BLD: ABNORMAL 10*6/UL
SEG NEUTROPHILS: 59 % (ref 36–65)
SEGMENTED NEUTROPHILS ABSOLUTE COUNT: 4.58 K/UL (ref 1.5–8.1)
SODIUM BLD-SCNC: 139 MMOL/L (ref 135–144)
TOTAL PROTEIN: 6.7 G/DL (ref 6.4–8.3)
TRIGL SERPL-MCNC: 138 MG/DL
TRIGLYCERIDE, FASTING: 138 MG/DL
WBC # BLD: 7.8 K/UL (ref 3.5–11.3)

## 2022-08-01 PROCEDURE — 80053 COMPREHEN METABOLIC PANEL: CPT

## 2022-08-01 PROCEDURE — 36415 COLL VENOUS BLD VENIPUNCTURE: CPT

## 2022-08-01 PROCEDURE — 83036 HEMOGLOBIN GLYCOSYLATED A1C: CPT

## 2022-08-01 PROCEDURE — 80061 LIPID PANEL: CPT

## 2022-08-01 PROCEDURE — 84460 ALANINE AMINO (ALT) (SGPT): CPT

## 2022-08-01 PROCEDURE — 85025 COMPLETE CBC W/AUTO DIFF WBC: CPT

## 2022-08-01 PROCEDURE — 84450 TRANSFERASE (AST) (SGOT): CPT

## 2022-08-02 ENCOUNTER — TELEPHONE (OUTPATIENT)
Dept: CARDIOLOGY | Age: 67
End: 2022-08-02

## 2022-08-02 LAB
ESTIMATED AVERAGE GLUCOSE: 235 MG/DL
HBA1C MFR BLD: 9.8 % (ref 4–6)

## 2022-08-02 RX ORDER — EZETIMIBE 10 MG/1
10 TABLET ORAL DAILY
Qty: 90 TABLET | Refills: 1 | Status: SHIPPED | OUTPATIENT
Start: 2022-08-02

## 2022-08-12 ENCOUNTER — OFFICE VISIT (OUTPATIENT)
Dept: FAMILY MEDICINE CLINIC | Age: 67
End: 2022-08-12
Payer: MEDICARE

## 2022-08-12 VITALS
WEIGHT: 202 LBS | SYSTOLIC BLOOD PRESSURE: 122 MMHG | HEIGHT: 67 IN | BODY MASS INDEX: 31.71 KG/M2 | DIASTOLIC BLOOD PRESSURE: 78 MMHG | HEART RATE: 68 BPM

## 2022-08-12 DIAGNOSIS — E11.9 TYPE 2 DIABETES MELLITUS WITHOUT COMPLICATION, WITHOUT LONG-TERM CURRENT USE OF INSULIN (HCC): Primary | ICD-10-CM

## 2022-08-12 PROCEDURE — 99214 OFFICE O/P EST MOD 30 MIN: CPT | Performed by: FAMILY MEDICINE

## 2022-08-12 PROCEDURE — 3046F HEMOGLOBIN A1C LEVEL >9.0%: CPT | Performed by: FAMILY MEDICINE

## 2022-08-12 PROCEDURE — 99212 OFFICE O/P EST SF 10 MIN: CPT | Performed by: FAMILY MEDICINE

## 2022-08-12 PROCEDURE — 1123F ACP DISCUSS/DSCN MKR DOCD: CPT | Performed by: FAMILY MEDICINE

## 2022-08-12 RX ORDER — OMEPRAZOLE 20 MG/1
CAPSULE, DELAYED RELEASE ORAL
Qty: 90 CAPSULE | Refills: 3 | Status: SHIPPED | OUTPATIENT
Start: 2022-08-12

## 2022-08-12 RX ORDER — GLIMEPIRIDE 2 MG/1
TABLET ORAL
Qty: 180 TABLET | Refills: 3 | Status: SHIPPED | OUTPATIENT
Start: 2022-08-12

## 2022-08-12 RX ORDER — SEMAGLUTIDE 1.34 MG/ML
0.5 INJECTION, SOLUTION SUBCUTANEOUS WEEKLY
Qty: 3 PEN | Refills: 5 | Status: SHIPPED | OUTPATIENT
Start: 2022-08-12

## 2022-08-12 SDOH — ECONOMIC STABILITY: FOOD INSECURITY: WITHIN THE PAST 12 MONTHS, YOU WORRIED THAT YOUR FOOD WOULD RUN OUT BEFORE YOU GOT MONEY TO BUY MORE.: PATIENT DECLINED

## 2022-08-12 SDOH — ECONOMIC STABILITY: FOOD INSECURITY: WITHIN THE PAST 12 MONTHS, THE FOOD YOU BOUGHT JUST DIDN'T LAST AND YOU DIDN'T HAVE MONEY TO GET MORE.: PATIENT DECLINED

## 2022-08-12 ASSESSMENT — PATIENT HEALTH QUESTIONNAIRE - PHQ9
2. FEELING DOWN, DEPRESSED OR HOPELESS: 0
SUM OF ALL RESPONSES TO PHQ QUESTIONS 1-9: 0
SUM OF ALL RESPONSES TO PHQ QUESTIONS 1-9: 0
SUM OF ALL RESPONSES TO PHQ9 QUESTIONS 1 & 2: 0
1. LITTLE INTEREST OR PLEASURE IN DOING THINGS: 0
SUM OF ALL RESPONSES TO PHQ QUESTIONS 1-9: 0
SUM OF ALL RESPONSES TO PHQ QUESTIONS 1-9: 0

## 2022-08-12 ASSESSMENT — ENCOUNTER SYMPTOMS
BACK PAIN: 1
CONSTIPATION: 0
EYES NEGATIVE: 1
RESPIRATORY NEGATIVE: 1
NAUSEA: 0
DIARRHEA: 1
ALLERGIC/IMMUNOLOGIC NEGATIVE: 1

## 2022-08-12 ASSESSMENT — SOCIAL DETERMINANTS OF HEALTH (SDOH): HOW HARD IS IT FOR YOU TO PAY FOR THE VERY BASICS LIKE FOOD, HOUSING, MEDICAL CARE, AND HEATING?: PATIENT DECLINED

## 2022-08-12 NOTE — PROGRESS NOTES
2022     Allyn Salamanca (:  1955) is a 77 y.o. female, here for evaluation of the following medical concerns:    Diabetes  Pertinent negatives for diabetes include no chest pain and no fatigue. Hypertension  Associated symptoms include neck pain (improved after surgery). Pertinent negatives include no chest pain. Scheduled follow up on diabetes, htn, hyperlipidemia. I have reviewed the patient's medical history in detail and updated the computerized patient record. She reports not checking bs much. Bs readings seem higher since her MI. Juventino Rosa Poor control with a1c at 11% on prior check. She had a month or so of noncompliance at that time. We restarted the amaryl and metformin and down to 6.9% by may 2020 follow up . She has had diarrhea , gas, and rare incontinence on the metformin, so she has not been as compliant with meds. She wasn't sure which drug was causing the issue so she was holding the amaryl as well. a1c up to 9% in December. She slacked on diet down in [place], she has been compliant with the medications \"95%\"   Currently she is off the metformin due to side effects. Still taking amaryl . No recurrent chest pain. No herd concerns. Bp seems to be running a little high in the 150's. Dr. Christina Reyna working on it with her, also added another med for cholesterol. Past Medical History:   Diagnosis Date    CAD (coronary artery disease) 2021    MI, stents    Cancer [facility])     Right Breast cancer    Cervical radiculopathy     DDD (degenerative disc disease)     cervical    GERD (gastroesophageal reflux disease)     Hyperlipidemia     Hypertension     Impaired fasting blood sugar     Kidney stone     Myopia with astigmatism and presbyopia     Obesity     Osteopenia     Renal cell carcinoma ([facility] [address] 06/10/13    right kidney: clear cell type. Corine grade 2 of 4.  2.5cm limited to kidney    Stenosis of cervical spine with myelopathy [facility])     Stress incontinence     Wears glasses      Past Surgical History:   Procedure Laterality Date    BLADDER SUSPENSION  2011    incontinence    BREAST BIOPSY Right 11/20/2007    wire localization     CERVICAL SPINE SURGERY  05/11/2018     ANTERIOR C4-5, C5-6 ARTHROPLASTY, (ONE PLATE AND FOUR SCREWS REMOVED C6-C7 AND DISCARDED    COLONOSCOPY  05/19/2008    COLONOSCOPY  05/03/2017    [id number]    CORONARY ANGIOPLASTY  12/03/2021    CORONARY ANGIOPLASTY  12/21/2021    CORONARY ANGIOPLASTY WITH STENT PLACEMENT  12/03/2021    CYSTOURETHROSCOPY Bilateral 09/02/2021    ENDOSCOPY, COLON, DIAGNOSTIC      HYSTERECTOMY, TOTAL ABDOMINAL (CERVIX REMOVED)  2011    with bladder lift    MASTECTOMY Bilateral 12/11/2007    lymphnodes were also removed    NECK SURGERY  06/30/2010    cervical 6-7 microdiscectomy. Dr. Gonzales Rivas Right 06/10/2013    limited to kidney    ID OFFICE/OUTPT VISIT,PROCEDURE ONLY N/A 05/11/2018    ANTERIOR C4-5, C5-6 ARTHROPLASTY WITH , SUPINE POSITION, MICROSCOPE, C-ARM, MEDTRONIC(REP NOTIFIED), EVOKES; (ONE PLATE AND FOUR SCREWS REMOVED C6-C7 AND DISCARDED) performed by Luba Tate DO at [address] Left 05/30/2019    TUBAL LIGATION Bilateral 1988    UPPER GASTROINTESTINAL ENDOSCOPY  01/12/2010    normal.  Dr. Parrish Ramirez     \      Review of Systems   Constitutional: Negative. Negative for fatigue and unexpected weight change. HENT: Negative. Eyes: Negative. Respiratory: Negative. Cardiovascular: Negative. Negative for chest pain. Gastrointestinal:  Positive for diarrhea (intermittent, metformin related). Negative for constipation and nausea. Endocrine: Negative. Genitourinary: Negative. Negative for flank pain. Musculoskeletal:  Positive for arthralgias (right shoulder), back pain (left lower back) and neck pain (improved after surgery). Negative for neck stiffness. Skin: Negative. Allergic/Immunologic: Negative.     Neurological: Negative. Hematological: Negative. Psychiatric/Behavioral: Negative. Prior to Visit Medications    Medication Sig Taking? Authorizing Provider   ezetimibe (ZETIA) 10 MG tablet Take 1 tablet by mouth in the morning. Yes Tima Kenny DO   carvedilol (COREG) 25 MG tablet Take 1 tablet by mouth 2 times daily Yes Tima Kenny DO   cephALEXin (KEFLEX) 500 MG capsule Take 500 mg by mouth 4 times daily Take one hour prior to dental work Yes Historical Provider, MD   furosemide (LASIX) 20 MG tablet Take 1 tablet by mouth daily as needed (edema/weight gain) As need for weight gain (Edema) Yes Tima Kenny DO   atorvastatin (LIPITOR) 40 MG tablet Take 1 tablet by mouth nightly Yes Tima Kenny DO   lisinopril (PRINIVIL;ZESTRIL) 40 MG tablet Take 1 tablet by mouth daily Yes Tima Kenny DO   ticagrelor (BRILINTA) 90 MG TABS tablet Take 1 tablet by mouth 2 times daily Yes Tima Kenny DO   isosorbide mononitrate (IMDUR) 30 MG extended release tablet Take 1 tablet by mouth daily Yes Tima Kenny DO   ibandronate (BONIVA) 150 MG tablet TAKE AS INSTRUCTED BY YOUR PRESCRIBER Yes Bryan Vicente MD   allopurinol (ZYLOPRIM) 300 MG tablet Take 300 mg by mouth daily Yes Historical Provider, MD   nitroGLYCERIN (NITROSTAT) 0.4 MG SL tablet Place 1 tablet under the tongue every 5 minutes as needed for Chest pain up to max of 3 total doses. If no relief after 1 dose, call 911. Yes Avinash Mayer MD   aspirin 81 MG chewable tablet Take 1 tablet by mouth daily Yes Soledad Sheffield MD   omeprazole (PRILOSEC) 20 MG delayed release capsule TAKE 1 CAPSULE DAILY  Patient taking differently: 20 mg  in the morning. TAKE 1 CAPSULE DAILY. Yes Bryan Vicente MD   glimepiride (AMARYL) 2 MG tablet Take 1 tablet by mouth twice daily with meals Yes Bryan Vicente MD        Social History     Tobacco Use    Smoking status: Never    Smokeless tobacco: Never    Tobacco comments:     nicolasaaldiomedes 7/29/15   Substance Use Topics    Alcohol use:  Yes     Alcohol/week: 0.0 standard drinks     Comment: rarely        Vitals:    08/12/22 0802   BP: 122/78   Site: Left Upper Arm   Position: Sitting   Cuff Size: Large Adult   Pulse: 68   Weight: 202 lb (91.6 kg)   Height: 5' 7\" (1.702 m)     Estimated body mass index is 31.64 kg/m² as calculated from the following:    Height as of this encounter: 5' 7\" (1.702 m). Weight as of this encounter: 202 lb (91.6 kg). Physical Exam  Constitutional:       General: She is not in acute distress. Appearance: She is normal weight. She is not toxic-appearing. HENT:      Head: Normocephalic and atraumatic. Nose: Nose normal.   Eyes:      Extraocular Movements: Extraocular movements intact. Pulmonary:      Effort: Pulmonary effort is normal. No respiratory distress. Musculoskeletal:      Cervical back: Normal range of motion. Skin:     Findings: No rash. Neurological:      Mental Status: She is alert. Psychiatric:         Mood and Affect: Mood normal.         Behavior: Behavior normal.         Thought Content: Thought content normal.         Judgment: Judgment normal.   /78 (Site: Left Upper Arm, Position: Sitting, Cuff Size: Large Adult)   Pulse 68   Ht 5' 7\" (1.702 m)   Wt 202 lb (91.6 kg)   LMP  (LMP Unknown)   BMI 31.64 kg/m²   Sensory exam of the foot is normal, tested with the monofilament. Good pulses, no lesions or ulcers, good peripheral pulses.     Hospital Outpatient Visit on 08/01/2022   Component Date Value Ref Range Status    Cholesterol, Fasting 08/01/2022 141  <200 mg/dL Final    HDL 08/01/2022 41  >40 mg/dL Final    LDL Cholesterol 08/01/2022 72  0 - 130 mg/dL Final    Chol/HDL Ratio 08/01/2022 3.4  <5 Final    Triglyceride, Fasting 08/01/2022 138  <150 mg/dL Final    Glucose 08/01/2022 253 (A) 70 - 99 mg/dL Final    BUN 08/01/2022 14  8 - 23 mg/dL Final    Creatinine 08/01/2022 0.71  0.50 - 0.90 mg/dL Final    Bun/Cre Ratio 08/01/2022 20  9 - 20 Final    Calcium 08/01/2022 9.3  8.6 - 10.4 mg/dL Final Sodium 08/01/2022 139  135 - 144 mmol/L Final    Potassium 08/01/2022 4.2  3.7 - 5.3 mmol/L Final    Chloride 08/01/2022 103  98 - 107 mmol/L Final    CO2 08/01/2022 26  20 - 31 mmol/L Final    Anion Gap 08/01/2022 10  9 - 17 mmol/L Final    Alkaline Phosphatase 08/01/2022 124 (A) 35 - 104 U/L Final    ALT 08/01/2022 18  5 - 33 U/L Final    AST 08/01/2022 15  <32 U/L Final    Total Bilirubin 08/01/2022 0.60  0.3 - 1.2 mg/dL Final    Total Protein 08/01/2022 6.7  6.4 - 8.3 g/dL Final    Albumin 08/01/2022 4.0  3.5 - 5.2 g/dL Final    Albumin/Globulin Ratio 08/01/2022 1.5  1.0 - 2.5 Final    GFR Non- 08/01/2022 >60  >60 mL/min Final    GFR  08/01/2022 >60  >60 mL/min Final    GFR Comment 08/01/2022        Final    WBC 08/01/2022 7.8  3.5 - 11.3 k/uL Final    RBC 08/01/2022 4.79  3.95 - 5.11 m/uL Final    Hemoglobin 08/01/2022 13.6  11.9 - 15.1 g/dL Final    Hematocrit 08/01/2022 39.5  36.3 - 47.1 % Final    MCV 08/01/2022 82.5 (A) 82.6 - 102.9 fL Final    MCH 08/01/2022 28.4  25.2 - 33.5 pg Final    MCHC 08/01/2022 34.4 (A) 25.2 - 33.5 g/dL Final    RDW 08/01/2022 13.5  11.8 - 14.4 % Final    Platelets 32/89/0480 235  138 - 453 k/uL Final    MPV 08/01/2022 10.1  8.1 - 13.5 fL Final    NRBC Automated 08/01/2022 0.0  0.0 per 100 WBC Final    Seg Neutrophils 08/01/2022 59  36 - 65 % Final    Lymphocytes 08/01/2022 31  24 - 43 % Final    Monocytes 08/01/2022 7  3 - 12 % Final    Eosinophils % 08/01/2022 2  1 - 4 % Final    Basophils 08/01/2022 0  0 - 2 % Final    Immature Granulocytes 08/01/2022 1 (A) 0 % Final    Segs Absolute 08/01/2022 4.58  1.50 - 8.10 k/uL Final    Absolute Lymph # 08/01/2022 2.44  1.10 - 3.70 k/uL Final    Absolute Mono # 08/01/2022 0.54  0.10 - 1.20 k/uL Final    Absolute Eos # 08/01/2022 0.19  0.00 - 0.44 k/uL Final    Basophils Absolute 08/01/2022 0.03  0.00 - 0.20 k/uL Final    Absolute Immature Granulocyte 08/01/2022 0.06  0.00 - 0.30 k/uL Final    RBC Morphology 08/01/2022 MICROCYTOSIS PRESENT   Final    Hemoglobin A1C 08/01/2022 9.8 (A) 4.0 - 6.0 % Final    Estimated Avg Glucose 08/01/2022 235  mg/dL Final   Hospital Outpatient Visit on 08/01/2022   Component Date Value Ref Range Status    ALT 08/01/2022 18  5 - 33 U/L Final    AST 08/01/2022 15  <32 U/L Final    Cholesterol 08/01/2022 141  <200 mg/dL Final    HDL 08/01/2022 41  >40 mg/dL Final    LDL Cholesterol 08/01/2022 72  0 - 130 mg/dL Final    Chol/HDL Ratio 08/01/2022 3.4  <5 Final    Triglycerides 08/01/2022 138  <150 mg/dL Final       ASSESSMENT/PLAN:  Encounter Diagnosis   Name Primary? Type 2 diabetes mellitus without complication, without long-term current use of insulin (HCC) Yes       Diabetes: better compliance with medications this interval.  Improved a1c down to 7.8%. back up to 9.8%. off metformin. Compliant with amaryl. Not checking bs at home. Adding ozempic 0.5mg sq every week. Htn: good control at present. Cont current coreg and lisinopril, lasix prn, imdur    Hyperlipidemia; recent labs reviewed good control on simvastatin,. Zetia added more recently. An electronic signature was used to authenticate this note.     --Annamarie Bryan MD on 8/12/2022 at 8:31 AM

## 2022-08-18 ENCOUNTER — TELEPHONE (OUTPATIENT)
Dept: FAMILY MEDICINE CLINIC | Age: 67
End: 2022-08-18

## 2022-08-29 ENCOUNTER — TELEPHONE (OUTPATIENT)
Dept: SURGERY | Age: 67
End: 2022-08-29

## 2022-08-31 DIAGNOSIS — D64.9 ANEMIA, UNSPECIFIED TYPE: ICD-10-CM

## 2022-08-31 DIAGNOSIS — N28.9 RENAL INSUFFICIENCY: ICD-10-CM

## 2022-08-31 DIAGNOSIS — M25.519 SHOULDER PAIN, UNSPECIFIED CHRONICITY, UNSPECIFIED LATERALITY: ICD-10-CM

## 2022-08-31 PROBLEM — Z95.5 PRESENCE OF CORONARY ANGIOPLASTY IMPLANT AND GRAFT: Status: ACTIVE | Noted: 2022-04-05

## 2022-08-31 PROBLEM — I25.2 OLD MYOCARDIAL INFARCTION: Status: ACTIVE | Noted: 2021-12-06

## 2022-09-14 ENCOUNTER — TELEPHONE (OUTPATIENT)
Dept: SURGERY | Age: 67
End: 2022-09-14

## 2022-09-14 RX ORDER — POLYETHYLENE GLYCOL 3350, SODIUM SULFATE ANHYDROUS, SODIUM BICARBONATE, SODIUM CHLORIDE, POTASSIUM CHLORIDE 236; 22.74; 6.74; 5.86; 2.97 G/4L; G/4L; G/4L; G/4L; G/4L
4 POWDER, FOR SOLUTION ORAL ONCE
Qty: 4000 ML | Refills: 0 | Status: SHIPPED | OUTPATIENT
Start: 2022-09-14 | End: 2022-09-14

## 2022-09-14 RX ORDER — BISACODYL 5 MG
TABLET, DELAYED RELEASE (ENTERIC COATED) ORAL
Qty: 2 TABLET | Refills: 0 | Status: SHIPPED | OUTPATIENT
Start: 2022-09-14 | End: 2022-10-17

## 2022-09-14 NOTE — TELEPHONE ENCOUNTER
[facility]       Srinivasan Allen         :1955           Surgical/Procedure Planned: Colonoscopy     Date & Location: 22@ [facility]       Outpatient   Planned Length of OR: 45 min. Sedation: intravenous sedation    Patients next OV with Dr. Marcelle Fuentes on 10/17/22.      Estimated Cardiac Risk for Non-Cardiac Surgery/Procedure     Low______ Moderate______ High______    Medication Instructions - Clarification needed by this date:       ASA 81 mg       Hold ___ Days    Brilinta   Hold ___ Days      Resume medications:     Lovenox indicated: _____Yes _____NO    Provider: Dr. Isac Mcfarland       Signature of Provider Giving Orders for Medication holds:    _____________________________________________

## 2022-09-14 NOTE — TELEPHONE ENCOUNTER
Spoke with patient at this time scheduled for a colonoscopy. Surgery instructions and bowel prep went over with patient at this time, denies any questions. Bowel prep and medication holds reviewed and mailed at this time. Acknowledges understanding of procedure and will call with any further questions.       Colonoscopy:                Family HX of Colon Cancer     Surgery Date: 11/2/22    Surgery Time: TBD    Pharmacy: [facility]

## 2022-09-15 NOTE — TELEPHONE ENCOUNTER
She will need to stay on her blood thinners until after 12/2022 (1 year post stent), then can consider coming off prior to colonoscopy.  thanks

## 2022-09-15 NOTE — TELEPHONE ENCOUNTER
Per cardiologist Dr. Sonja Kraft patient will need to stay on her blood thinners until after 12/2022 (1 year post stent), then she can consider coming off prior to colonoscopy. Patient aware and will be rescheduled for January of 2023. [facility] aware of cancellation.

## 2022-10-17 ENCOUNTER — OFFICE VISIT (OUTPATIENT)
Dept: CARDIOLOGY | Age: 67
End: 2022-10-17
Payer: MEDICARE

## 2022-10-17 VITALS
SYSTOLIC BLOOD PRESSURE: 130 MMHG | DIASTOLIC BLOOD PRESSURE: 78 MMHG | HEIGHT: 67 IN | BODY MASS INDEX: 30.45 KG/M2 | WEIGHT: 194 LBS | HEART RATE: 82 BPM

## 2022-10-17 DIAGNOSIS — I15.8 OTHER SECONDARY HYPERTENSION: ICD-10-CM

## 2022-10-17 DIAGNOSIS — I25.118 CORONARY ARTERY DISEASE OF NATIVE HEART WITH STABLE ANGINA PECTORIS, UNSPECIFIED VESSEL OR LESION TYPE (HCC): Primary | ICD-10-CM

## 2022-10-17 DIAGNOSIS — I21.3 ST ELEVATION MYOCARDIAL INFARCTION (STEMI), UNSPECIFIED ARTERY (HCC): ICD-10-CM

## 2022-10-17 DIAGNOSIS — E78.2 MIXED HYPERLIPIDEMIA: ICD-10-CM

## 2022-10-17 DIAGNOSIS — R60.0 LOWER EXTREMITY EDEMA: ICD-10-CM

## 2022-10-17 DIAGNOSIS — Z98.61 CORONARY ANGIOPLASTY STATUS: ICD-10-CM

## 2022-10-17 PROCEDURE — 99213 OFFICE O/P EST LOW 20 MIN: CPT | Performed by: INTERNAL MEDICINE

## 2022-10-17 PROCEDURE — 93010 ELECTROCARDIOGRAM REPORT: CPT | Performed by: INTERNAL MEDICINE

## 2022-10-17 PROCEDURE — 99214 OFFICE O/P EST MOD 30 MIN: CPT | Performed by: INTERNAL MEDICINE

## 2022-10-17 PROCEDURE — 93005 ELECTROCARDIOGRAM TRACING: CPT | Performed by: INTERNAL MEDICINE

## 2022-10-17 PROCEDURE — 1123F ACP DISCUSS/DSCN MKR DOCD: CPT | Performed by: INTERNAL MEDICINE

## 2022-10-17 RX ORDER — HYDROCHLOROTHIAZIDE 12.5 MG/1
12.5 CAPSULE, GELATIN COATED ORAL EVERY MORNING
Qty: 90 CAPSULE | Refills: 1 | Status: SHIPPED | OUTPATIENT
Start: 2022-10-17

## 2022-10-17 NOTE — PROGRESS NOTES
Cardiology Consultation/Follow Up. [address] [facility] [address]  1955  [id number]    Today: 10/17/22    CC: Patient is here for follow up for CAD, STEMI, s/p PCI. HPI:   Lynn Bolton is here for follow up for CAD, STEMI, s/p PCI. Now on coreg. BP improved. But still having higher BP at home in range of 140-150s. Finished with cardiac rehab. No chest pain, sob, orthopnea, pnd, le edema. She is thinking about possible colonoscopy after she is done with her 1 year of DAPT. Past Medical:  Past Medical History:   Diagnosis Date    CAD (coronary artery disease) 12/03/2021    MI, stents    Cancer ([address] 2006    Right Breast cancer    Cervical radiculopathy     DDD (degenerative disc disease)     cervical    GERD (gastroesophageal reflux disease)     Hyperlipidemia     Hypertension     Impaired fasting blood sugar     Kidney stone     Myopia with astigmatism and presbyopia     Obesity     Osteopenia     Renal cell carcinoma ([address] 06/10/2013    right kidney: clear cell type. Corine grade 2 of 4.  2.5cm limited to kidney    Stenosis of cervical spine with myelopathy (HCC)     Stress incontinence     Type 2 diabetes mellitus without complication ([address]     Wears glasses        Past Surgical:  Past Surgical History:   Procedure Laterality Date    BLADDER SUSPENSION  2011    incontinence    BREAST BIOPSY Right 11/20/2007    wire localization     CERVICAL SPINE SURGERY  05/11/2018     ANTERIOR C4-5, C5-6 ARTHROPLASTY, (ONE PLATE AND FOUR SCREWS REMOVED C6-C7 AND DISCARDED    COLONOSCOPY  05/19/2008    COLONOSCOPY  05/03/2017    [id number]    CORONARY ANGIOPLASTY  12/03/2021    CORONARY ANGIOPLASTY  12/21/2021    CORONARY ANGIOPLASTY WITH STENT PLACEMENT  12/03/2021    CYSTOURETHROSCOPY Bilateral 09/02/2021    ENDOSCOPY, COLON, DIAGNOSTIC      HYSTERECTOMY, TOTAL ABDOMINAL (CERVIX REMOVED)  2011    with bladder lift    MASTECTOMY Bilateral 12/11/2007    lymphnodes were also removed    NECK SURGERY  06/30/2010    cervical 6-7 microdiscectomy. Dr. Rosa Lynch Right 06/10/2013    limited to kidney    [place] OFFICE/OUTPT VISIT,PROCEDURE ONLY N/A 05/11/2018    ANTERIOR C4-5, C5-6 ARTHROPLASTY WITH , SUPINE POSITION, MICROSCOPE, C-ARM, MEDTRONIC(REP NOTIFIED), EVOKES; (ONE PLATE AND FOUR SCREWS REMOVED C6-C7 AND DISCARDED) performed by Db Urban DO at [address] Left 05/30/2019    TUBAL LIGATION Bilateral 1988    UPPER GASTROINTESTINAL ENDOSCOPY  01/12/2010    normal.  Dr. Nhi Neil EXTRACTION         Family History:  Family History   Problem Relation Age of Onset    High Blood Pressure Mother     Dementia Mother     Cancer Mother         colon cancer    High Blood Pressure Father     Diabetes Father     Heart Disease Father     High Blood Pressure Brother     Cancer Brother         lip    Cancer Maternal Uncle         pancreatic cancer    Cancer Maternal Grandmother         breast cancer    Heart Disease Paternal Grandmother     Diabetes Paternal Grandfather      Social History:  Social History     Socioeconomic History    Marital status:    Tobacco Use    Smoking status: Never    Smokeless tobacco: Never    Tobacco comments:     cwaldiomedes 7/29/15   Vaping Use    Vaping Use: Never used   Substance and Sexual Activity    Alcohol use: Yes     Alcohol/week: 0.0 standard drinks     Comment: rarely    Drug use: No     Social Determinants of Health     Financial Resource Strain: Unknown    Difficulty of Paying Living Expenses: Patient refused   Food Insecurity: Unknown    Worried About Running Out of Food in the Last Year: Patient refused    [address] in the Last Year: Patient refused     REVIEW OF SYSTEMS:    Constitutional: there has been no unanticipated weight loss. There's been No change in energy level, No change in activity level. Eyes: No visual changes or diplopia. No scleral icterus.   ENT: No Headaches, hearing loss or vertigo. No mouth sores or sore throat. Cardiovascular: AS HPI  Respiratory: AS HPI  Gastrointestinal: No abdominal pain, appetite loss, blood in stools. No change in bowel or bladder habits. Genitourinary: No dysuria, trouble voiding, or hematuria. Musculoskeletal:  No gait disturbance, No weakness or joint complaints. Integumentary: No rash or pruritis. Neurological: No headache, diplopia, change in muscle strength, numbness or tingling. No change in gait, balance, coordination, mood, affect, memory, mentation, behavior. Psychiatric: No new anxiety or depression. Endocrine: No temperature intolerance. No excessive thirst, fluid intake, or urination. No tremor. Hematologic/Lymphatic: No abnormal bruising or bleeding, blood clots or swollen lymph nodes. Allergic/Immunologic: No nasal congestion or hives. Medications:  Outpatient Medications Marked as Taking for the 10/17/22 encounter (Office Visit) with Michael Christopher, DO   Medication Sig Dispense Refill    omeprazole (PRILOSEC) 20 MG delayed release capsule TAKE 1 CAPSULE DAILY 90 capsule 3    glimepiride (AMARYL) 2 MG tablet Take 1 tablet by mouth twice daily with meals 180 tablet 3    Semaglutide,0.25 or 0.5MG/DOS, (OZEMPIC, 0.25 OR 0.5 MG/DOSE,) 2 MG/1.5ML SOPN Inject 0.5 mg into the skin once a week 3 pen 5    ezetimibe (ZETIA) 10 MG tablet Take 1 tablet by mouth in the morning.  90 tablet 1    carvedilol (COREG) 25 MG tablet Take 1 tablet by mouth 2 times daily 180 tablet 3    cephALEXin (KEFLEX) 500 MG capsule Take 500 mg by mouth 4 times daily Take one hour prior to dental work      furosemide (LASIX) 20 MG tablet Take 1 tablet by mouth daily as needed (edema/weight gain) As need for weight gain (Edema) 90 tablet 3    atorvastatin (LIPITOR) 40 MG tablet Take 1 tablet by mouth nightly 90 tablet 3    lisinopril (PRINIVIL;ZESTRIL) 40 MG tablet Take 1 tablet by mouth daily 90 tablet 3    ticagrelor (BRILINTA) 90 MG TABS tablet Take 1 tablet by mouth 2 times daily 180 tablet 3    isosorbide mononitrate (IMDUR) 30 MG extended release tablet Take 1 tablet by mouth daily 90 tablet 1    ibandronate (BONIVA) 150 MG tablet TAKE AS INSTRUCTED BY YOUR PRESCRIBER 12 tablet 3    allopurinol (ZYLOPRIM) 300 MG tablet Take 300 mg by mouth daily      nitroGLYCERIN (NITROSTAT) 0.4 MG SL tablet Place 1 tablet under the tongue every 5 minutes as needed for Chest pain up to max of 3 total doses. If no relief after 1 dose, call 911. 25 tablet 3    aspirin 81 MG chewable tablet Take 1 tablet by mouth daily 30 tablet 3      Physical Exam:   Vitals: /78   Pulse 82   Ht 5' 7\" (1.702 m)   Wt 194 lb (88 kg)   LMP  (LMP Unknown)   BMI 30.38 kg/m²   General appearance: alert and cooperative with exam  HEENT: Head: Normocephalic, no lesions, without obvious abnormality. Neck: no carotid bruit, no JVD  Lungs: clear to auscultation bilaterally  Heart: regular rate and rhythm, S1, S2 normal, no Murmur  Abdomen: soft, non-tender; bowel sounds normal; no masses,  no organomegaly  Extremities: no site injection hematoma, extremities normal, atraumatic, no cyanosis.  trace edema  Neurologic: Mental status: Alert, oriented, thought content appropriate    Labs:  Lab Results   Component Value Date    CHOL 141 08/01/2022    TRIG 138 08/01/2022    HDL 41 08/01/2022    HDL 41 08/01/2022    LDLCHOLESTEROL 72 08/01/2022    LDLCHOLESTEROL 72 08/01/2022    VLDL NOT REPORTED (H) 04/09/2021    CHOLHDLRATIO 3.4 08/01/2022    CHOLHDLRATIO 3.4 08/01/2022       Lab Results   Component Value Date     08/01/2022    K 4.2 08/01/2022     08/01/2022    CO2 26 08/01/2022    BUN 14 08/01/2022    CREATININE 0.71 08/01/2022    GLUCOSE 253 (H) 08/01/2022    CALCIUM 9.3 08/01/2022    PROT 6.7 08/01/2022    LABALBU 4.0 08/01/2022    BILITOT 0.60 08/01/2022    ALKPHOS 124 (H) 08/01/2022    AST 15 08/01/2022    AST 15 08/01/2022    ALT 18 08/01/2022    ALT 18 08/01/2022    LABGLOM >60 08/01/2022    GFRAA >60 08/01/2022     EKG:   Sinus  Rhythm  -First degree A-V block  - frequent ectopic ventricular beat s   Elis = 256   # VECs = 2    ECHO:   Results for orders placed or performed during the hospital encounter of 12/03/21   ECHO Complete 2D W Doppler W Color  Global left ventricular systolic function is normal. Calculated ejection  fraction 48% by Sequeira's method. Visually estimated EF 50-55%. Moderate concentric left ventricular hypertrophy. No significant valvular regurgitation or stenosis seen. Limited Echo 12/6/21:  Left ventricle is normal in size, global left ventricular systolic function  is normal.  Calculated ejection fraction is 55%. Mild to moderate left ventricular hypertrophy. Aortic valve is trileaflet. No evidence of aortic insufficiency or stenosis (visually.)  Mitral valve sclerosis without stenosis. Trivial mitral regurgitation. Normal tricuspid valve leaflets. No tricuspid regurgitation was seen. No significant pericardial effusion is seen. CATH 12/3/21:   Multivessel CAD   Acute occlusion of the mid RCA, required PTCA -JOHN PAUL   Residual disease in branches, D1, Om1 and OM2. Lower normal LV function     Cath 12/21/21:   Procedure Summary   Successful PTCA - JOHN PAUL OM1, OM2   Successful PTCA -JOHN PAUL proximal D1     Past Medical and Surgical History, Problem List, Allergies, Medications, Labs, Imaging, all reviewed extensively in EMR and with the patient. Assessment:  - HTN- improved but not quite at goal based on home readings. - Chest tightness / pressure- resolved  - SOB- resolved  - MV CAD - presented with Inferior STEMI 12/21- s/p PCI to mRCA, then staged PCI to D1/Om1/Om2  - Trace LE edema- resolved off norvasc  - DL  - Preserved LVEF Echo  - H/o Renal Ca  - H/o DM    Plan:  - add hctz 12.5 qd  - bmp in 2 weeks.    - continue coreg 25 bid,  imdur, DAPT, statin, zetia, lisinopril  - use lasix PRN  - at or around 1/2023, she will be 1 year post PCI and will be moderate risk for Colonoscopy, she will call office around then. The patient is to continue heart healthy diet, weight loss and exercise as tolerated. Patient's medications and side effects were discussed. Medication refills were provided if needed. Follow up appointment timing was discussed. All questions and concerns were addressed to patient's satisfaction. The patient is to follow up in 3 months or sooner if necessary. Thank you for allowing me to participate in the care of this patient, please do not hesitate to call if you have any questions. Reinaldo Zhang DO, [address], [address], [address], [place] [address] [facility]  [facility]  [phone] Leukocytosis

## 2022-10-24 ENCOUNTER — APPOINTMENT (OUTPATIENT)
Dept: NON INVASIVE DIAGNOSTICS | Age: 67
End: 2022-10-24
Payer: MEDICARE

## 2022-10-24 ENCOUNTER — HOSPITAL ENCOUNTER (OUTPATIENT)
Age: 67
Setting detail: OBSERVATION
Discharge: HOME | End: 2022-10-25
Attending: EMERGENCY MEDICINE | Admitting: INTERNAL MEDICINE
Payer: MEDICARE

## 2022-10-24 ENCOUNTER — APPOINTMENT (OUTPATIENT)
Dept: GENERAL RADIOLOGY | Age: 67
End: 2022-10-24
Payer: MEDICARE

## 2022-10-24 DIAGNOSIS — R07.9 CHEST PAIN, UNSPECIFIED TYPE: Primary | ICD-10-CM

## 2022-10-24 LAB
ABSOLUTE EOS #: 0.12 K/UL (ref 0–0.44)
ABSOLUTE IMMATURE GRANULOCYTE: 0.04 K/UL (ref 0–0.3)
ABSOLUTE LYMPH #: 1.71 K/UL (ref 1.1–3.7)
ABSOLUTE MONO #: 0.46 K/UL (ref 0.1–1.2)
ALBUMIN SERPL-MCNC: 4.2 G/DL (ref 3.5–5.2)
ALBUMIN/GLOBULIN RATIO: 1.3 (ref 1–2.5)
ALP BLD-CCNC: 124 U/L (ref 35–104)
ALT SERPL-CCNC: 16 U/L (ref 5–33)
ANION GAP SERPL CALCULATED.3IONS-SCNC: 13 MMOL/L (ref 9–17)
AST SERPL-CCNC: 21 U/L
BASOPHILS # BLD: 0 % (ref 0–2)
BASOPHILS ABSOLUTE: 0.03 K/UL (ref 0–0.2)
BILIRUB SERPL-MCNC: 0.6 MG/DL (ref 0.3–1.2)
BILIRUBIN DIRECT: 0.1 MG/DL
BILIRUBIN, INDIRECT: 0.5 MG/DL (ref 0–1)
BUN BLDV-MCNC: 40 MG/DL (ref 8–23)
BUN/CREAT BLD: 33 (ref 9–20)
CALCIUM SERPL-MCNC: 9.8 MG/DL (ref 8.6–10.4)
CHLORIDE BLD-SCNC: 99 MMOL/L (ref 98–107)
CO2: 24 MMOL/L (ref 20–31)
CREAT SERPL-MCNC: 1.21 MG/DL (ref 0.5–0.9)
EKG ATRIAL RATE: 72 BPM
EKG P AXIS: 49 DEGREES
EKG P-R INTERVAL: 242 MS
EKG Q-T INTERVAL: 404 MS
EKG QRS DURATION: 92 MS
EKG QTC CALCULATION (BAZETT): 442 MS
EKG R AXIS: 4 DEGREES
EKG T AXIS: 49 DEGREES
EKG VENTRICULAR RATE: 72 BPM
EOSINOPHILS RELATIVE PERCENT: 1 % (ref 1–4)
GFR SERPL CREATININE-BSD FRML MDRD: 49 ML/MIN/1.73M2
GLOBULIN: 3.2 G/DL (ref 1.5–3.8)
GLUCOSE BLD-MCNC: 150 MG/DL (ref 65–105)
GLUCOSE BLD-MCNC: 192 MG/DL (ref 70–99)
GLUCOSE BLD-MCNC: 98 MG/DL (ref 65–105)
HCT VFR BLD CALC: 41.1 % (ref 36.3–47.1)
HEMOGLOBIN: 14.1 G/DL (ref 11.9–15.1)
IMMATURE GRANULOCYTES: 1 %
LIPASE: 59 U/L (ref 13–60)
LV EF: 55 %
LVEF MODALITY: NORMAL
LYMPHOCYTES # BLD: 21 % (ref 24–43)
MCH RBC QN AUTO: 28.3 PG (ref 25.2–33.5)
MCHC RBC AUTO-ENTMCNC: 34.3 G/DL (ref 25.2–33.5)
MCV RBC AUTO: 82.5 FL (ref 82.6–102.9)
MONOCYTES # BLD: 6 % (ref 3–12)
NRBC AUTOMATED: 0 PER 100 WBC
PDW BLD-RTO: 14.2 % (ref 11.8–14.4)
PLATELET # BLD: 249 K/UL (ref 138–453)
PMV BLD AUTO: 10.4 FL (ref 8.1–13.5)
POTASSIUM SERPL-SCNC: 4.2 MMOL/L (ref 3.7–5.3)
PRO-BNP: 48 PG/ML
RBC # BLD: 4.98 M/UL (ref 3.95–5.11)
RBC # BLD: ABNORMAL 10*6/UL
REASON FOR REJECTION: NORMAL
SARS-COV-2, RAPID: NOT DETECTED
SEG NEUTROPHILS: 71 % (ref 36–65)
SEGMENTED NEUTROPHILS ABSOLUTE COUNT: 5.94 K/UL (ref 1.5–8.1)
SODIUM BLD-SCNC: 136 MMOL/L (ref 135–144)
SPECIMEN DESCRIPTION: NORMAL
TOTAL PROTEIN: 7.4 G/DL (ref 6.4–8.3)
TROPONIN, HIGH SENSITIVITY: 10 NG/L (ref 0–14)
TROPONIN, HIGH SENSITIVITY: 11 NG/L (ref 0–14)
TROPONIN, HIGH SENSITIVITY: 12 NG/L (ref 0–14)
WBC # BLD: 8.3 K/UL (ref 3.5–11.3)
ZZ NTE CLEAN UP: ORDERED TEST: NORMAL
ZZ NTE WITH NAME CLEAN UP: SPECIMEN SOURCE: NORMAL

## 2022-10-24 PROCEDURE — 6370000000 HC RX 637 (ALT 250 FOR IP): Performed by: NURSE PRACTITIONER

## 2022-10-24 PROCEDURE — 6370000000 HC RX 637 (ALT 250 FOR IP): Performed by: INTERNAL MEDICINE

## 2022-10-24 PROCEDURE — 99285 EMERGENCY DEPT VISIT HI MDM: CPT

## 2022-10-24 PROCEDURE — 82947 ASSAY GLUCOSE BLOOD QUANT: CPT

## 2022-10-24 PROCEDURE — 80076 HEPATIC FUNCTION PANEL: CPT

## 2022-10-24 PROCEDURE — G0378 HOSPITAL OBSERVATION PER HR: HCPCS

## 2022-10-24 PROCEDURE — 99219 PR INITIAL OBSERVATION CARE/DAY 50 MINUTES: CPT | Performed by: INTERNAL MEDICINE

## 2022-10-24 PROCEDURE — 36415 COLL VENOUS BLD VENIPUNCTURE: CPT

## 2022-10-24 PROCEDURE — 80048 BASIC METABOLIC PNL TOTAL CA: CPT

## 2022-10-24 PROCEDURE — 85025 COMPLETE CBC W/AUTO DIFF WBC: CPT

## 2022-10-24 PROCEDURE — 71045 X-RAY EXAM CHEST 1 VIEW: CPT

## 2022-10-24 PROCEDURE — 83690 ASSAY OF LIPASE: CPT

## 2022-10-24 PROCEDURE — 6360000002 HC RX W HCPCS: Performed by: INTERNAL MEDICINE

## 2022-10-24 PROCEDURE — 93005 ELECTROCARDIOGRAM TRACING: CPT | Performed by: EMERGENCY MEDICINE

## 2022-10-24 PROCEDURE — 96372 THER/PROPH/DIAG INJ SC/IM: CPT

## 2022-10-24 PROCEDURE — 94760 N-INVAS EAR/PLS OXIMETRY 1: CPT

## 2022-10-24 PROCEDURE — 87635 SARS-COV-2 COVID-19 AMP PRB: CPT

## 2022-10-24 PROCEDURE — 84484 ASSAY OF TROPONIN QUANT: CPT

## 2022-10-24 PROCEDURE — 2580000003 HC RX 258: Performed by: INTERNAL MEDICINE

## 2022-10-24 PROCEDURE — 83880 ASSAY OF NATRIURETIC PEPTIDE: CPT

## 2022-10-24 PROCEDURE — 93306 TTE W/DOPPLER COMPLETE: CPT

## 2022-10-24 RX ORDER — ISOSORBIDE MONONITRATE 30 MG/1
30 TABLET, EXTENDED RELEASE ORAL DAILY
Status: DISCONTINUED | OUTPATIENT
Start: 2022-10-25 | End: 2022-10-25 | Stop reason: HOSPADM

## 2022-10-24 RX ORDER — ENOXAPARIN SODIUM 100 MG/ML
1 INJECTION SUBCUTANEOUS 2 TIMES DAILY
Status: DISCONTINUED | OUTPATIENT
Start: 2022-10-24 | End: 2022-10-25 | Stop reason: HOSPADM

## 2022-10-24 RX ORDER — ACETAMINOPHEN 325 MG/1
650 TABLET ORAL EVERY 4 HOURS PRN
Status: DISCONTINUED | OUTPATIENT
Start: 2022-10-24 | End: 2022-10-25 | Stop reason: HOSPADM

## 2022-10-24 RX ORDER — GLIPIZIDE 5 MG/1
5 TABLET ORAL
Status: DISCONTINUED | OUTPATIENT
Start: 2022-10-24 | End: 2022-10-25 | Stop reason: HOSPADM

## 2022-10-24 RX ORDER — CARVEDILOL 12.5 MG/1
25 TABLET ORAL 2 TIMES DAILY
Status: DISCONTINUED | OUTPATIENT
Start: 2022-10-24 | End: 2022-10-25 | Stop reason: HOSPADM

## 2022-10-24 RX ORDER — ALLOPURINOL 300 MG/1
300 TABLET ORAL DAILY
Status: DISCONTINUED | OUTPATIENT
Start: 2022-10-25 | End: 2022-10-25 | Stop reason: HOSPADM

## 2022-10-24 RX ORDER — ATORVASTATIN CALCIUM 40 MG/1
40 TABLET, FILM COATED ORAL NIGHTLY
Status: DISCONTINUED | OUTPATIENT
Start: 2022-10-25 | End: 2022-10-25 | Stop reason: HOSPADM

## 2022-10-24 RX ORDER — INSULIN LISPRO 100 [IU]/ML
0-4 INJECTION, SOLUTION INTRAVENOUS; SUBCUTANEOUS NIGHTLY
Status: DISCONTINUED | OUTPATIENT
Start: 2022-10-24 | End: 2022-10-25 | Stop reason: HOSPADM

## 2022-10-24 RX ORDER — SODIUM CHLORIDE 9 MG/ML
INJECTION, SOLUTION INTRAVENOUS CONTINUOUS
Status: DISCONTINUED | OUTPATIENT
Start: 2022-10-24 | End: 2022-10-25 | Stop reason: HOSPADM

## 2022-10-24 RX ORDER — MAGNESIUM HYDROXIDE/ALUMINUM HYDROXICE/SIMETHICONE 120; 1200; 1200 MG/30ML; MG/30ML; MG/30ML
30 SUSPENSION ORAL EVERY 6 HOURS PRN
Status: DISCONTINUED | OUTPATIENT
Start: 2022-10-24 | End: 2022-10-25 | Stop reason: HOSPADM

## 2022-10-24 RX ORDER — HYDROCHLOROTHIAZIDE 12.5 MG/1
12.5 CAPSULE, GELATIN COATED ORAL EVERY MORNING
Status: DISCONTINUED | OUTPATIENT
Start: 2022-10-25 | End: 2022-10-24 | Stop reason: RX

## 2022-10-24 RX ORDER — ONDANSETRON 2 MG/ML
4 INJECTION INTRAMUSCULAR; INTRAVENOUS EVERY 6 HOURS PRN
Status: DISCONTINUED | OUTPATIENT
Start: 2022-10-24 | End: 2022-10-25 | Stop reason: HOSPADM

## 2022-10-24 RX ORDER — SODIUM CHLORIDE 0.9 % (FLUSH) 0.9 %
10 SYRINGE (ML) INJECTION EVERY 12 HOURS SCHEDULED
Status: DISCONTINUED | OUTPATIENT
Start: 2022-10-24 | End: 2022-10-25 | Stop reason: HOSPADM

## 2022-10-24 RX ORDER — HYDROCHLOROTHIAZIDE 12.5 MG/1
12.5 TABLET ORAL EVERY MORNING
Status: DISCONTINUED | OUTPATIENT
Start: 2022-10-25 | End: 2022-10-25 | Stop reason: HOSPADM

## 2022-10-24 RX ORDER — LISINOPRIL 20 MG/1
40 TABLET ORAL DAILY
Status: DISCONTINUED | OUTPATIENT
Start: 2022-10-25 | End: 2022-10-25 | Stop reason: HOSPADM

## 2022-10-24 RX ORDER — SODIUM CHLORIDE 0.9 % (FLUSH) 0.9 %
10 SYRINGE (ML) INJECTION PRN
Status: DISCONTINUED | OUTPATIENT
Start: 2022-10-24 | End: 2022-10-25 | Stop reason: HOSPADM

## 2022-10-24 RX ORDER — FUROSEMIDE 20 MG/1
20 TABLET ORAL DAILY PRN
Status: DISCONTINUED | OUTPATIENT
Start: 2022-10-24 | End: 2022-10-25 | Stop reason: HOSPADM

## 2022-10-24 RX ORDER — ASPIRIN 81 MG/1
81 TABLET, CHEWABLE ORAL DAILY
Status: DISCONTINUED | OUTPATIENT
Start: 2022-10-25 | End: 2022-10-25 | Stop reason: HOSPADM

## 2022-10-24 RX ORDER — INSULIN LISPRO 100 [IU]/ML
0-4 INJECTION, SOLUTION INTRAVENOUS; SUBCUTANEOUS
Status: DISCONTINUED | OUTPATIENT
Start: 2022-10-24 | End: 2022-10-25 | Stop reason: HOSPADM

## 2022-10-24 RX ORDER — SODIUM CHLORIDE 9 MG/ML
INJECTION, SOLUTION INTRAVENOUS PRN
Status: DISCONTINUED | OUTPATIENT
Start: 2022-10-24 | End: 2022-10-25 | Stop reason: HOSPADM

## 2022-10-24 RX ORDER — EZETIMIBE 10 MG/1
10 TABLET ORAL DAILY
Status: DISCONTINUED | OUTPATIENT
Start: 2022-10-25 | End: 2022-10-25 | Stop reason: HOSPADM

## 2022-10-24 RX ORDER — PANTOPRAZOLE SODIUM 40 MG/1
40 TABLET, DELAYED RELEASE ORAL
Status: DISCONTINUED | OUTPATIENT
Start: 2022-10-25 | End: 2022-10-25 | Stop reason: HOSPADM

## 2022-10-24 RX ORDER — DEXTROSE MONOHYDRATE 100 MG/ML
INJECTION, SOLUTION INTRAVENOUS CONTINUOUS PRN
Status: DISCONTINUED | OUTPATIENT
Start: 2022-10-24 | End: 2022-10-25 | Stop reason: HOSPADM

## 2022-10-24 RX ADMIN — CARVEDILOL 25 MG: 12.5 TABLET, FILM COATED ORAL at 21:26

## 2022-10-24 RX ADMIN — SODIUM CHLORIDE: 9 INJECTION, SOLUTION INTRAVENOUS at 13:17

## 2022-10-24 RX ADMIN — TICAGRELOR 90 MG: 60 TABLET ORAL at 21:26

## 2022-10-24 RX ADMIN — SODIUM CHLORIDE: 9 INJECTION, SOLUTION INTRAVENOUS at 21:32

## 2022-10-24 RX ADMIN — GLIPIZIDE 5 MG: 5 TABLET ORAL at 16:21

## 2022-10-24 RX ADMIN — ENOXAPARIN SODIUM 90 MG: 100 INJECTION SUBCUTANEOUS at 21:26

## 2022-10-24 RX ADMIN — ENOXAPARIN SODIUM 90 MG: 100 INJECTION SUBCUTANEOUS at 13:10

## 2022-10-24 RX ADMIN — ACETAMINOPHEN 650 MG: 325 TABLET ORAL at 16:23

## 2022-10-24 RX ADMIN — ALUMINUM HYDROXIDE, MAGNESIUM HYDROXIDE, AND SIMETHICONE 30 ML: 200; 200; 20 SUSPENSION ORAL at 23:56

## 2022-10-24 ASSESSMENT — LIFESTYLE VARIABLES
HOW MANY STANDARD DRINKS CONTAINING ALCOHOL DO YOU HAVE ON A TYPICAL DAY: 1 OR 2
HOW OFTEN DO YOU HAVE A DRINK CONTAINING ALCOHOL: MONTHLY OR LESS
HOW OFTEN DO YOU HAVE A DRINK CONTAINING ALCOHOL: NEVER

## 2022-10-24 ASSESSMENT — PAIN DESCRIPTION - LOCATION
LOCATION: HEAD
LOCATION: CHEST;BACK

## 2022-10-24 ASSESSMENT — PAIN DESCRIPTION - FREQUENCY: FREQUENCY: INTERMITTENT

## 2022-10-24 ASSESSMENT — PAIN SCALES - GENERAL
PAINLEVEL_OUTOF10: 0
PAINLEVEL_OUTOF10: 3

## 2022-10-24 ASSESSMENT — ENCOUNTER SYMPTOMS: NAUSEA: 1

## 2022-10-24 ASSESSMENT — PAIN - FUNCTIONAL ASSESSMENT: PAIN_FUNCTIONAL_ASSESSMENT: 0-10

## 2022-10-24 ASSESSMENT — PAIN DESCRIPTION - DESCRIPTORS
DESCRIPTORS: TIGHTNESS
DESCRIPTORS: ACHING

## 2022-10-24 ASSESSMENT — PAIN DESCRIPTION - DIRECTION: RADIATING_TOWARDS: MIDDLE BACK

## 2022-10-24 ASSESSMENT — PAIN DESCRIPTION - PAIN TYPE: TYPE: ACUTE PAIN

## 2022-10-24 NOTE — ED TRIAGE NOTES
Pt to ER with c/o mid chest pressure that radiates to the back. Reports dizziness and episode of vomiting yesterday. Continued dizziness today.  Hx of Stemi recently

## 2022-10-24 NOTE — PROGRESS NOTES
DISCHARGE BARRIERS       Reason for Referral:  SW completed a Psychosocial Assessment for evaluation of patient's mental health, social status, and functional capacity within the community. Jw Jeff is a 77 y.o. female admitted due to Chest pain. SW provided supportive listening while patient discussed past medical history and events leading up to hospitalization. Mental Status:  Alert, oriented, and engaging during assessment. Decision Making:  Makes own decisions. Family/Social/Home Environment: Lives with spouse in [address] [place] [address]: Discussed a good social support network     Current Services: None    Current DMEs: None    PCP: Nico Dallas MD and repots no issues affording medication.  status:  None     ADLs and means of transportation: Independent in ADLs prior to hospitalization and able to transport self. Food insecurity or needed financial assistance: Denies any food insecurity or financial concerns at this time. ACP and Code Status:  Jw Jeff is a Full Code status and does not have an Advance Directive. SW discussed an Advance Directive which included the patient's choices for care and treatment in the case of a health event that adversely affects decision-making abilities. SW provided education and resources. Jw Jeff has no questions at this time and has agreed to keep me up-to-date should anything change. Collaborative List of SNF/ECF/HH were provided: offered, declined No discharge order at this time. Anticipated Needs/Discharge Plan:  Spoke with patient/family/representative about discharge plan. Patient/Family/Representative verbalizes understanding of the plan of care and denies discharge needs or further services at this time. SW provided business card. SW will continue to monitor needs and assist as appropriate.          Electronically signed by MITZI Chi on 10/24/2022 at 3:02 PM

## 2022-10-24 NOTE — ED PROVIDER NOTES
[facility] ED  [address]  Phone: [phone]        Pt Name: Merced Zamarripa  MRN: [id number]  [place] 1955  Date of evaluation: 10/24/22      CHIEF COMPLAINT     Chief Complaint   Patient presents with    Chest Pain     C/o chest pain and tightness, vomiting today         HISTORY OF PRESENT ILLNESS  (Location/Symptom, Timing/Onset, Context/Setting, Quality, Duration, Modifying Factors, Severity.)    Merced Zamarripa is a 77 y.o. female who presents with chest tightness. This 59-year-old female states that yesterday she started developing intermittent episodes of chest tightness associated with some nausea slight radiation towards the back lasting 5 to 10 minutes and then resolving nothing she does makes her symptoms better or worse she had another episode this morning called her family doctor who advised her to come to the ER upon arrival here she is completely symptom-free there is no fever chills or cough no unusual swelling of the arms or legs no vomiting no diarrhea      REVIEW OF SYSTEMS    (2-9 systems for level 4, 10 or more for level 5)     Review of Systems   Cardiovascular:  Positive for chest pain. Gastrointestinal:  Positive for nausea. All other systems reviewed and are negative. PAST MEDICAL HISTORY    has a past medical history of CAD (coronary artery disease), Cancer ([address] Cervical radiculopathy, DDD (degenerative disc disease), GERD (gastroesophageal reflux disease), Hyperlipidemia, Hypertension, Impaired fasting blood sugar, Kidney stone, Myopia with astigmatism and presbyopia, Obesity, Osteopenia, Renal cell carcinoma ([address] Stenosis of cervical spine with myelopathy ([address] Stress incontinence, Type 2 diabetes mellitus without complication ([address] and Wears glasses. SURGICAL HISTORY      has a past surgical history that includes Mastectomy (Bilateral, 12/11/2007); partial nephrectomy (Right, 06/10/2013);  Hysterectomy, total abdominal (2011); bladder suspension (2011); Neck surgery (06/30/2010); Tubal ligation (Bilateral, 1988); Colonoscopy (05/19/2008); Upper gastrointestinal endoscopy (01/12/2010); [place] tooth extraction; Breast biopsy (Right, 11/20/2007); Colonoscopy (05/03/2017); Cervical spine surgery (05/11/2018); pr office/outpt visit,procedure only (N/A, 05/11/2018); Total shoulder arthroplasty (Left, 05/30/2019); cystourethroscopy (Bilateral, 09/02/2021); Coronary angioplasty with stent (12/03/2021); Endoscopy, colon, diagnostic; Coronary angioplasty (12/03/2021); and Coronary angioplasty (12/21/2021). CURRENTMEDICATIONS       Previous Medications    ALLOPURINOL (ZYLOPRIM) 300 MG TABLET    Take 300 mg by mouth daily    ASPIRIN 81 MG CHEWABLE TABLET    Take 1 tablet by mouth daily    ATORVASTATIN (LIPITOR) 40 MG TABLET    Take 1 tablet by mouth nightly    CARVEDILOL (COREG) 25 MG TABLET    Take 1 tablet by mouth 2 times daily    CEPHALEXIN (KEFLEX) 500 MG CAPSULE    Take 500 mg by mouth 4 times daily Take one hour prior to dental work    EZETIMIBE (ZETIA) 10 MG TABLET    Take 1 tablet by mouth in the morning. FUROSEMIDE (LASIX) 20 MG TABLET    Take 1 tablet by mouth daily as needed (edema/weight gain) As need for weight gain (Edema)    GLIMEPIRIDE (AMARYL) 2 MG TABLET    Take 1 tablet by mouth twice daily with meals    HYDROCHLOROTHIAZIDE (MICROZIDE) 12.5 MG CAPSULE    Take 1 capsule by mouth every morning    IBANDRONATE (BONIVA) 150 MG TABLET    TAKE AS INSTRUCTED BY YOUR PRESCRIBER    ISOSORBIDE MONONITRATE (IMDUR) 30 MG EXTENDED RELEASE TABLET    Take 1 tablet by mouth daily    LISINOPRIL (PRINIVIL;ZESTRIL) 40 MG TABLET    Take 1 tablet by mouth daily    NITROGLYCERIN (NITROSTAT) 0.4 MG SL TABLET    Place 1 tablet under the tongue every 5 minutes as needed for Chest pain up to max of 3 total doses. If no relief after 1 dose, call 911.     OMEPRAZOLE (PRILOSEC) 20 MG DELAYED RELEASE CAPSULE    TAKE 1 CAPSULE DAILY SEMAGLUTIDE,0.25 OR 0.5MG/DOS, (OZEMPIC, 0.25 OR 0.5 MG/DOSE,) 2 MG/1.5ML SOPN    Inject 0.5 mg into the skin once a week    TICAGRELOR (BRILINTA) 90 MG TABS TABLET    Take 1 tablet by mouth 2 times daily       ALLERGIES     has No Known Allergies. FAMILY HISTORY     She indicated that her mother is . She indicated that her father is . She indicated that her brother is alive. She indicated that her maternal grandmother is . She indicated that her maternal grandfather is . She indicated that her paternal grandmother is . She indicated that her paternal grandfather is . She indicated that both of her children are alive. She indicated that the status of her maternal uncle is unknown.     family history includes Cancer in her brother, maternal grandmother, maternal uncle, and mother; Dementia in her mother; Diabetes in her father and paternal grandfather; Heart Disease in her father and paternal grandmother; High Blood Pressure in her brother, father, and mother. SOCIAL HISTORY      reports that she has never smoked. She has never used smokeless tobacco. She reports current alcohol use. She reports that she does not use drugs. PHYSICAL EXAM    (up to 7 for level 4, 8 or more for level 5)   INITIAL VITALS:  height is 5' 7\" (1.702 m) and weight is 189 lb (85.7 kg). Her temperature is 97.3 °F (36.3 °C). Her blood pressure is 120/70 and her pulse is 75. Her respiration is 18 and oxygen saturation is 99%. Physical Exam  Vitals and nursing note reviewed. Constitutional:       Appearance: Normal appearance. HENT:      Head: Normocephalic and atraumatic. Eyes:      Conjunctiva/sclera: Conjunctivae normal.   Cardiovascular:      Rate and Rhythm: Normal rate and regular rhythm. Pulses: Normal pulses. Heart sounds: Normal heart sounds. Pulmonary:      Effort: Pulmonary effort is normal. No respiratory distress. Breath sounds: Normal breath sounds. No stridor. No wheezing, rhonchi or rales. Abdominal:      General: Bowel sounds are normal. There is no distension. Palpations: Abdomen is soft. There is no mass. Tenderness: There is no abdominal tenderness. There is no right CVA tenderness, left CVA tenderness, guarding or rebound. Musculoskeletal:         General: No swelling or tenderness. Normal range of motion. Cervical back: Normal range of motion and neck supple. No rigidity or tenderness. Comments: No calf swelling or tenderness appreciated   Lymphadenopathy:      Cervical: No cervical adenopathy. Skin:     General: Skin is warm and dry. Findings: No rash. Neurological:      General: No focal deficit present. Mental Status: She is alert. DIFFERENTIAL DIAGNOSIS/ MDM:     The patient dates she already took her aspirin and Brilinta with this I will get a chest x-ray EKG and labs    DIAGNOSTIC RESULTS     EKG: All EKG's are interpreted by the Emergency Department Physician who either signs or Co-signs this chart in the absence of a cardiologist.      Interpreted by Jacey Sprague MD     Rhythm: normal sinus   Rate: 72  Axis: 4  Ectopy: none  Conduction: First-degree AV block  ST Segments: no acute change  T Waves: no acute change  Q Waves: none    Clinical Impression: normal sinus rhythm with no acute changes/normal EKG. No acute infarction/ischemia noted. RADIOLOGY:        Interpretation per the Radiologist below, if available at the time of this note:    XR CHEST PORTABLE    Result Date: 10/24/2022  EXAMINATION: ONE XRAY VIEW OF THE CHEST 10/24/2022 9:26 am COMPARISON: AP chest from 12/05/2021 HISTORY: ORDERING SYSTEM PROVIDED HISTORY: chest pain TECHNOLOGIST PROVIDED HISTORY: chest pain Reason for Exam: Chest pain History of diabetes, GERD; breast and renal cell carcinoma. FINDINGS: Overlying ECG monitor leads. Left shoulder prosthesis. Cervical hardware. Clips right axilla.  Cardiac silhouette WNL in size for AP technique, and unchanged. Mediastinal structures midline and stable. No localized pulmonary opacity or significant blunting of the costophrenic angles. No pneumothorax. Bones appear unchanged, again with mild-moderate DJD TS and right shoulder. No acute cardiopulmonary disease. LABS:  Results for orders placed or performed during the hospital encounter of 29/41/80   Basic Metabolic Panel   Result Value Ref Range    Glucose 192 (H) 70 - 99 mg/dL    BUN 40 (H) 8 - 23 mg/dL    Creatinine 1.21 (H) 0.50 - 0.90 mg/dL    Est, Glom Filt Rate 49 (L) >60 mL/min/1.73m2    Bun/Cre Ratio 33 (H) 9 - 20    Calcium 9.8 8.6 - 10.4 mg/dL    Sodium 136 135 - 144 mmol/L    Potassium 4.2 3.7 - 5.3 mmol/L    Chloride 99 98 - 107 mmol/L    CO2 24 20 - 31 mmol/L    Anion Gap 13 9 - 17 mmol/L   Troponin   Result Value Ref Range    Troponin, High Sensitivity 12 0 - 14 ng/L   Hepatic Function Panel   Result Value Ref Range    Albumin 4.2 3.5 - 5.2 g/dL    Alkaline Phosphatase 124 (H) 35 - 104 U/L    ALT 16 5 - 33 U/L    AST 21 <32 U/L    Total Bilirubin 0.6 0.3 - 1.2 mg/dL    Bilirubin, Direct 0.1 <0.31 mg/dL    Bilirubin, Indirect 0.5 0.00 - 1.00 mg/dL    Total Protein 7.4 6.4 - 8.3 g/dL    Globulin 3.2 1.5 - 3.8 g/dL    Albumin/Globulin Ratio 1.3 1.0 - 2.5   Brain Natriuretic Peptide   Result Value Ref Range    Pro-BNP 48 <300 pg/mL   Lipase   Result Value Ref Range    Lipase 59 13 - 60 U/L   SPECIMEN REJECTION   Result Value Ref Range    Specimen Source ER DRAW     Ordered Test CDP     Reason for Rejection Unable to perform testing: Specimen clotted.     EKG 12 Lead   Result Value Ref Range    Ventricular Rate 72 BPM    Atrial Rate 72 BPM    P-R Interval 242 ms    QRS Duration 92 ms    Q-T Interval 404 ms    QTc Calculation (Bazett) 442 ms    P Axis 49 degrees    R Axis 4 degrees    T Axis 49 degrees           EMERGENCY DEPARTMENT COURSE:   Vitals:    Vitals:    10/24/22 0853 10/24/22 0940   BP: 134/84 120/70 Pulse: 75 75   Resp: 18    Temp: 97.3 °F (36.3 °C)    SpO2: 99% 99%   Weight: 189 lb (85.7 kg)    Height: 5' 7\" (1.702 m)      -------------------------  BP: 120/70, Temp: 97.3 °F (36.3 °C), Heart Rate: 75, Resp: 18      RE-EVALUATION:  The patient presents with intermittent chest discomfort radiating into the back causing some nausea is symptom-free here in the emergency department did have an episode earlier this morning she does have risk factors including known coronary artery disease given this I do feel that she warrants admission to the hospital setting for observation and serial enzymes the patient is agreeable to this plan so will contact my hospitalist for admission    CONSULTS:  I did discuss the patient with my hospitalist who is requesting that we contact cardiology and then he would be happy to admit the patient to his service I then discussed the patient with cardiology they are agreeable to the patient being admitted here at [facility]:  None    FINAL IMPRESSION      1. Chest pain, unspecified type          DISPOSITION/PLAN   DISPOSITION Decision To Admit 10/24/2022 10:04:10 AM      CONDITION ON DISPOSITION:   Stable    PATIENT REFERRED TO:  No follow-up provider specified. DISCHARGE MEDICATIONS:  New Prescriptions    No medications on file       (Please note that portions of this note were completed with a voicerecognition program.  Efforts were made to edit the dictations but occasionally words are mis-transcribed.)    Jamaal Peters MD,, MD, F.A.C.E.P.   Attending Emergency Medicine Physician       Jamaal Peters MD  10/24/22 2067

## 2022-10-24 NOTE — CARE COORDINATION
Case Management Initial Discharge Plan  Ellie Abner             Met with:patient to discuss discharge plans. Information verified: address, contacts, phone number, , and insurance: Yes  Insurance Provider: Primary:  Payor: Delaney Garcia / Plan: Barry DRISCOLL / Product Type: Medicare /                                         Emergency Contact/Next of Kin name & number: verified  Who are involved in patient's support system?     PCP: Arabella Gomez MD  Date of last visit: see chart    Discharge Planning    Living Arrangements:  Spouse/Significant Other     Home has 2 stories  2 steps to get into front door  Location of bedroom/bathroom in home: first    Patient able to perform ADL's:Independent    Current Services (outpatient & in home) none    Is patient receiving oral anticoagulation therapy? No    Potential Assistance Needed:  N/A    Patient agreeable to home care: n/a  Freedom of choice provided:  no    Prior SNF/Rehab Placement and Facility: no  Agreeable to SNF/Rehab: n/a  [place] of choice provided: no      Expected Discharge date:       Patient expects to be discharged to: If home: is the family and/or caregiver wiling & able to provide support at home? yes  Who will be providing this support?     Follow Up Appointment: Best Day/ Time: Monday AM    Transportation provider: family  Transportation arrangements needed for discharge: No    Readmission Risk              Risk of Unplanned Readmission:  0             Does patient have a readmission risk score greater than 20?: n/a  If yes, follow-up appointment must be made within 7 days of discharge.      Goals of Care:       Educated patient on transitional options and is agreeable with plan      Discharge Plan: home without needs          Electronically signed by Chrissie Carrillo RN on 10/24/22 at 2:00 PM EDT

## 2022-10-24 NOTE — H&P
HOSPITALIST ADMISSION H&P      REASON FOR ADMISSION:  Chest pain---STEMI history  ESTIMATED LENGTH OF STAY:   > 2 midnights---2-3 days      ATTENDING/ADMITTING PHYSICIAN: Jc Omer MD MD  PCP: Db Esteves MD    HISTORY OF PRESENT ILLNESS:      The patient is a 77 y.o. female patient of Db Esteves MD who presents with event on 10.23.2022---chest tightness--dyspnea--dizziness---lightheadedness---nausea--duration ~ 15 minutes---currently chest pain free---Troponin = 12---EKG---10.24.2022---SR--72--1st degree AVB--old inferior infarction. ASCVD---inferior STEMI---12.3.2021--JOHN PAUL D1--OM1--OM2    HTN    Hyperlipidemia    Diabetes Mellitus Type 2         See below for additional PMH. Patient nsmy-etvquvcjbi-zkinbauv-available records reviewed, including, but not limited to,  ER reports--labs--imaging---EKGs---2D ECHOs---cardiac cath reports---office records---personal notes. Past Medical History:   Diagnosis Date    CAD (coronary artery disease) 12/03/2021    MI, stents    Cancer [facility]) 2006    Right Breast cancer    Cervical radiculopathy     DDD (degenerative disc disease)     cervical    GERD (gastroesophageal reflux disease)     Hyperlipidemia     Hypertension     Impaired fasting blood sugar     Kidney stone     Myopia with astigmatism and presbyopia     Obesity     Osteopenia     Renal cell carcinoma ([address] 06/10/2013    right kidney: clear cell type. Corine grade 2 of 4.  2.5cm limited to kidney    Stenosis of cervical spine with myelopathy (HCC)     Stress incontinence     Type 2 diabetes mellitus without complication ([address]     Wears glasses            Past Surgical History:   Procedure Laterality Date    BLADDER SUSPENSION  2011    incontinence    BREAST BIOPSY Right 11/20/2007    wire localization     CERVICAL SPINE SURGERY  05/11/2018     ANTERIOR C4-5, C5-6 ARTHROPLASTY, (ONE PLATE AND FOUR SCREWS REMOVED C6-C7 AND DISCARDED    COLONOSCOPY  05/19/2008    COLONOSCOPY  05/03/2017 [id number]    CORONARY ANGIOPLASTY  12/03/2021    CORONARY ANGIOPLASTY  12/21/2021    CORONARY ANGIOPLASTY WITH STENT PLACEMENT  12/03/2021    CYSTOURETHROSCOPY Bilateral 09/02/2021    ENDOSCOPY, COLON, DIAGNOSTIC      HYSTERECTOMY, TOTAL ABDOMINAL (CERVIX REMOVED)  2011    with bladder lift    MASTECTOMY Bilateral 12/11/2007    lymphnodes were also removed    NECK SURGERY  06/30/2010    cervical 6-7 microdiscectomy. Dr. Antonio Carbajal Right 06/10/2013    limited to kidney    OK OFFICE/OUTPT VISIT,PROCEDURE ONLY N/A 05/11/2018    ANTERIOR C4-5, C5-6 ARTHROPLASTY WITH , SUPINE POSITION, MICROSCOPE, C-ARM, MEDTRONIC(REP NOTIFIED), EVOKES; (ONE PLATE AND FOUR SCREWS REMOVED C6-C7 AND DISCARDED) performed by Linda Chauhan DO at [facility] [address] Left 05/30/2019    TUBAL LIGATION Bilateral 1988    UPPER GASTROINTESTINAL ENDOSCOPY  01/12/2010    normal.  Dr. Saravanan March EXTRACTION         Medications Prior to Admission:    Medications Prior to Admission: hydroCHLOROthiazide (MICROZIDE) 12.5 MG capsule, Take 1 capsule by mouth every morning  omeprazole (PRILOSEC) 20 MG delayed release capsule, TAKE 1 CAPSULE DAILY  glimepiride (AMARYL) 2 MG tablet, Take 1 tablet by mouth twice daily with meals  Semaglutide,0.25 or 0.5MG/DOS, (OZEMPIC, 0.25 OR 0.5 MG/DOSE,) 2 MG/1.5ML SOPN, Inject 0.5 mg into the skin once a week  ezetimibe (ZETIA) 10 MG tablet, Take 1 tablet by mouth in the morning.   carvedilol (COREG) 25 MG tablet, Take 1 tablet by mouth 2 times daily  furosemide (LASIX) 20 MG tablet, Take 1 tablet by mouth daily as needed (edema/weight gain) As need for weight gain (Edema)  atorvastatin (LIPITOR) 40 MG tablet, Take 1 tablet by mouth nightly  lisinopril (PRINIVIL;ZESTRIL) 40 MG tablet, Take 1 tablet by mouth daily  ticagrelor (BRILINTA) 90 MG TABS tablet, Take 1 tablet by mouth 2 times daily  isosorbide mononitrate (IMDUR) 30 MG extended release tablet, Take 1 tablet by mouth daily  [DISCONTINUED] cephALEXin (KEFLEX) 500 MG capsule, Take 500 mg by mouth 4 times daily Take one hour prior to dental work (Patient not taking: Reported on 10/24/2022)  ibandronate (BONIVA) 150 MG tablet, TAKE AS INSTRUCTED BY YOUR PRESCRIBER  allopurinol (ZYLOPRIM) 300 MG tablet, Take 300 mg by mouth daily  nitroGLYCERIN (NITROSTAT) 0.4 MG SL tablet, Place 1 tablet under the tongue every 5 minutes as needed for Chest pain up to max of 3 total doses. If no relief after 1 dose, call 911.  aspirin 81 MG chewable tablet, Take 1 tablet by mouth daily    Allergies:    Patient has no known allergies. Social History:    reports that she has never smoked. She has never used smokeless tobacco. She reports current alcohol use. She reports that she does not use drugs. Family History:   family history includes Cancer in her brother, maternal grandmother, maternal uncle, and mother; Dementia in her mother; Diabetes in her father and paternal grandfather; Heart Disease in her father and paternal grandmother; High Blood Pressure in her brother, father, and mother. REVIEW OF SYSTEMS:  See HPI and problem list; otherwise no other new complaints with respect to HEENT, neck, pulmonary, coronary, GI, , endocrine, musculoskeletal, immune system/connective tissue disease, hematologic, neuropsych, skin, lymphatics, or malignancies. PHYSICAL EXAM:  Vitals:  /74   Pulse 73   Temp 97.7 °F (36.5 °C) (Oral)   Resp 18   Ht 5' 7\" (1.702 m)   Wt 189 lb (85.7 kg)   LMP  (LMP Unknown)   SpO2 98%   BMI 29.60 kg/m²     HEENT: Normocephalic and Atraumatic  Neck: Supple, No Masses, Tenderness, Nodularity, and No Lymphadenopathy  Chest/Lungs: Clear to Auscultation without Rales, Rhonchi, or Wheezes  Cardiac: Regular Rate and Rhythm--II/VI SHIMON  GI/Abdomen:  Bowel Sounds Present and Soft, Non-tender, without Guarding or Rebound Tenderness  : Not examined  EXT/Skin: No Edema, No Cyanosis, and No Clubbing  Neuro: Alert and Oriented, No Localizing Signs/Symptoms        LABS:    CBC with Differential:    Lab Results   Component Value Date/Time    WBC 8.3 10/24/2022 09:59 AM    RBC 4.98 10/24/2022 09:59 AM    HGB 14.1 10/24/2022 09:59 AM    HCT 41.1 10/24/2022 09:59 AM     10/24/2022 09:59 AM    MCV 82.5 10/24/2022 09:59 AM    MCH 28.3 10/24/2022 09:59 AM    MCHC 34.3 10/24/2022 09:59 AM    RDW 14.2 10/24/2022 09:59 AM    LYMPHOPCT 21 10/24/2022 09:59 AM    MONOPCT 6 10/24/2022 09:59 AM    BASOPCT 0 10/24/2022 09:59 AM    MONOSABS 0.46 10/24/2022 09:59 AM    LYMPHSABS 1.71 10/24/2022 09:59 AM    EOSABS 0.12 10/24/2022 09:59 AM    BASOSABS 0.03 10/24/2022 09:59 AM    DIFFTYPE NOT REPORTED 12/05/2021 09:11 AM     BMP:    Lab Results   Component Value Date/Time     10/24/2022 09:13 AM    K 4.2 10/24/2022 09:13 AM    CL 99 10/24/2022 09:13 AM    CO2 24 10/24/2022 09:13 AM    BUN 40 10/24/2022 09:13 AM    LABALBU 4.2 10/24/2022 09:13 AM    LABALBU 3.7 05/31/2019 05:47 AM    CREATININE 1.21 10/24/2022 09:13 AM    CALCIUM 9.8 10/24/2022 09:13 AM    GFRAA >60 08/01/2022 07:43 AM    LABGLOM 49 10/24/2022 09:13 AM    GLUCOSE 192 10/24/2022 09:13 AM    GLUCOSE 243 05/31/2019 05:47 AM       ASSESSMENT:      Patient Active Problem List   Diagnosis    Cancer ([facility]    Hypertension    Renal cell carcinoma ([facility]    DDD (degenerative disc disease)    Kidney stone    Stress incontinence    GERD (gastroesophageal reflux disease)    Obesity    Chest pain, unspecified    Impaired fasting blood sugar    Cervical disc disorder    Type 2 diabetes mellitus ([facility]    Osteopenia    Acute myocardial infarction ([facility]    Old myocardial infarction    Presence of coronary angioplasty implant and graft    Anemia    Shoulder pain    Renal insufficiency    Chest pain     BRY STORY.     66  [facility] Lang, FP; DC Cardiology---[facility], Margareth Mejia, Alea Jones, Urology]                                        FULL  CODE LOVENOX   BRILINTA---ASA   COVID-19--NEGATIVE      Chest pain----10.24.2022--event---10.23.2022--chest tightness--dyspnea--dizziness--                          lightheadedness--blurred vision--belching----duration ~           CXR---10.24.2022--left shoulder prosthesis--cervical hardware--NACs         EKG---10.24.2022---SR--72--1st degree AVB--old inferior infarction          Troponin---10.24.2022 = 12    ASCVD         Cardiac catheterization---12.3.2021---95% OM1---90% OM2---95% D1--                         JOHN PAUL OM1--OM2---D1          Inferior STEMI--12.3.2021  II/VI SHIMON  CKD---Stage 3b     Chest pain---chest pressure-sharp pain ----7.29.2015--                  radiation left arm-back--fatigue        MI ruled out---7.30.2015      d-dimer--[-]--7.29.2015      EKG--7.29.2015--NSR--87--1st degree AVB      2D ECHO--7.30.2015---NLVSF--mild LAE---NRVSF--                      TRACY--ns TR---RVSP ~ 28 mm Hg---Grade 2 DD---                      LVEF ~ 56%   Hypertension   Hyperlipidemia  Diabetes Mellitus Type 2  GERD  Obesity  Malignancies      Renal cell carcinoma--clear cell--Corine 2-4-                       cm organ confined--2013       Right breast carcinoma--2006    PMH: stress incontinence, DDD-cervical spine, kidney stones, left elbow fracture---1984,             cervical spine stenosis, myopia---astigmatism--presbyopia    PSH:  bilateral mastectomy--excision LN--2007, partial right             nephrectomy--2013, total hysterectomy--bladder             suspension---2011, microdiscetomy--C6-7--2010,              bilateral tubal ligation--1988, EGD--2010, normal             colonoscopy--2010, cystoscopy--laser lithotripsy, anterior C4-6 arthroplasty--            plate and 4 screws [id number]---6409, left total shoulder--2019    Allergies:  NKDA      Code Status:     Full Code  OARRS--10.24.2022--[-]    PLAN:       Chest pain---ACS regimen---Cardiology---2D ECHO     DM2---diabetic admission orders     Home medications reviewed  4.       See orders     Note that over 50 minutes was spent in evaluation of the patient, review of the chart and pertinent records, discussion with family/staff, etc    Jakob Lee MD MD  1:19 PM  10/24/2022

## 2022-10-25 ENCOUNTER — APPOINTMENT (OUTPATIENT)
Dept: GENERAL RADIOLOGY | Age: 67
End: 2022-10-25
Payer: MEDICARE

## 2022-10-25 VITALS
OXYGEN SATURATION: 97 % | HEART RATE: 79 BPM | WEIGHT: 189 LBS | DIASTOLIC BLOOD PRESSURE: 67 MMHG | BODY MASS INDEX: 29.66 KG/M2 | RESPIRATION RATE: 17 BRPM | TEMPERATURE: 97.7 F | HEIGHT: 67 IN | SYSTOLIC BLOOD PRESSURE: 118 MMHG

## 2022-10-25 LAB
ABSOLUTE EOS #: 0.16 K/UL (ref 0–0.44)
ABSOLUTE IMMATURE GRANULOCYTE: 0.03 K/UL (ref 0–0.3)
ABSOLUTE LYMPH #: 2.6 K/UL (ref 1.1–3.7)
ABSOLUTE MONO #: 0.52 K/UL (ref 0.1–1.2)
ANION GAP SERPL CALCULATED.3IONS-SCNC: 11 MMOL/L (ref 9–17)
BASOPHILS # BLD: 1 % (ref 0–2)
BASOPHILS ABSOLUTE: 0.04 K/UL (ref 0–0.2)
BUN BLDV-MCNC: 30 MG/DL (ref 8–23)
BUN/CREAT BLD: 37 (ref 9–20)
CALCIUM SERPL-MCNC: 9 MG/DL (ref 8.6–10.4)
CHLORIDE BLD-SCNC: 105 MMOL/L (ref 98–107)
CO2: 23 MMOL/L (ref 20–31)
CREAT SERPL-MCNC: 0.82 MG/DL (ref 0.5–0.9)
EOSINOPHILS RELATIVE PERCENT: 2 % (ref 1–4)
GFR SERPL CREATININE-BSD FRML MDRD: >60 ML/MIN/1.73M2
GLUCOSE BLD-MCNC: 122 MG/DL (ref 70–99)
GLUCOSE BLD-MCNC: 138 MG/DL (ref 65–105)
HCT VFR BLD CALC: 37.4 % (ref 36.3–47.1)
HEMOGLOBIN: 13 G/DL (ref 11.9–15.1)
IMMATURE GRANULOCYTES: 0 %
LYMPHOCYTES # BLD: 32 % (ref 24–43)
MCH RBC QN AUTO: 28.3 PG (ref 25.2–33.5)
MCHC RBC AUTO-ENTMCNC: 34.8 G/DL (ref 25.2–33.5)
MCV RBC AUTO: 81.5 FL (ref 82.6–102.9)
MONOCYTES # BLD: 6 % (ref 3–12)
NRBC AUTOMATED: 0 PER 100 WBC
PDW BLD-RTO: 13.9 % (ref 11.8–14.4)
PLATELET # BLD: 226 K/UL (ref 138–453)
PMV BLD AUTO: 10.1 FL (ref 8.1–13.5)
POTASSIUM SERPL-SCNC: 3.7 MMOL/L (ref 3.7–5.3)
RBC # BLD: 4.59 M/UL (ref 3.95–5.11)
RBC # BLD: ABNORMAL 10*6/UL
SEG NEUTROPHILS: 59 % (ref 36–65)
SEGMENTED NEUTROPHILS ABSOLUTE COUNT: 4.8 K/UL (ref 1.5–8.1)
SODIUM BLD-SCNC: 139 MMOL/L (ref 135–144)
TROPONIN, HIGH SENSITIVITY: 23 NG/L (ref 0–14)
WBC # BLD: 8.2 K/UL (ref 3.5–11.3)

## 2022-10-25 PROCEDURE — 85025 COMPLETE CBC W/AUTO DIFF WBC: CPT

## 2022-10-25 PROCEDURE — 99204 OFFICE O/P NEW MOD 45 MIN: CPT | Performed by: INTERNAL MEDICINE

## 2022-10-25 PROCEDURE — 99217 PR OBSERVATION CARE DISCHARGE MANAGEMENT: CPT | Performed by: INTERNAL MEDICINE

## 2022-10-25 PROCEDURE — 93005 ELECTROCARDIOGRAM TRACING: CPT | Performed by: INTERNAL MEDICINE

## 2022-10-25 PROCEDURE — G0378 HOSPITAL OBSERVATION PER HR: HCPCS

## 2022-10-25 PROCEDURE — 71046 X-RAY EXAM CHEST 2 VIEWS: CPT

## 2022-10-25 PROCEDURE — 82947 ASSAY GLUCOSE BLOOD QUANT: CPT

## 2022-10-25 PROCEDURE — 84484 ASSAY OF TROPONIN QUANT: CPT

## 2022-10-25 PROCEDURE — 36415 COLL VENOUS BLD VENIPUNCTURE: CPT

## 2022-10-25 PROCEDURE — 96360 HYDRATION IV INFUSION INIT: CPT

## 2022-10-25 PROCEDURE — 6370000000 HC RX 637 (ALT 250 FOR IP): Performed by: INTERNAL MEDICINE

## 2022-10-25 PROCEDURE — 96361 HYDRATE IV INFUSION ADD-ON: CPT

## 2022-10-25 PROCEDURE — 80048 BASIC METABOLIC PNL TOTAL CA: CPT

## 2022-10-25 PROCEDURE — 2580000003 HC RX 258: Performed by: INTERNAL MEDICINE

## 2022-10-25 RX ORDER — FAMOTIDINE 20 MG/1
20 TABLET, FILM COATED ORAL 2 TIMES DAILY PRN
Qty: 60 TABLET | Refills: 0 | COMMUNITY
Start: 2022-10-25

## 2022-10-25 RX ORDER — MAGNESIUM HYDROXIDE/ALUMINUM HYDROXICE/SIMETHICONE 120; 1200; 1200 MG/30ML; MG/30ML; MG/30ML
30 SUSPENSION ORAL EVERY 6 HOURS PRN
Refills: 0 | COMMUNITY
Start: 2022-10-25

## 2022-10-25 RX ADMIN — LISINOPRIL 40 MG: 20 TABLET ORAL at 09:09

## 2022-10-25 RX ADMIN — ALLOPURINOL 300 MG: 300 TABLET ORAL at 09:09

## 2022-10-25 RX ADMIN — CARVEDILOL 25 MG: 12.5 TABLET, FILM COATED ORAL at 09:09

## 2022-10-25 RX ADMIN — ASPIRIN 81 MG CHEWABLE TABLET 81 MG: 81 TABLET CHEWABLE at 09:09

## 2022-10-25 RX ADMIN — HYDROCHLOROTHIAZIDE 12.5 MG: 12.5 TABLET ORAL at 09:09

## 2022-10-25 RX ADMIN — ISOSORBIDE MONONITRATE 30 MG: 30 TABLET, EXTENDED RELEASE ORAL at 09:09

## 2022-10-25 RX ADMIN — TICAGRELOR 90 MG: 60 TABLET ORAL at 09:09

## 2022-10-25 RX ADMIN — GLIPIZIDE 5 MG: 5 TABLET ORAL at 06:00

## 2022-10-25 RX ADMIN — PANTOPRAZOLE SODIUM 40 MG: 40 TABLET, DELAYED RELEASE ORAL at 06:00

## 2022-10-25 RX ADMIN — SODIUM CHLORIDE: 9 INJECTION, SOLUTION INTRAVENOUS at 06:03

## 2022-10-25 RX ADMIN — EZETIMIBE 10 MG: 10 TABLET ORAL at 09:09

## 2022-10-25 NOTE — DISCHARGE INSTR - DIET
Good nutrition is important when healing from an illness, injury, or surgery. Follow any nutrition recommendations given to you during your hospital stay. If you were given an oral nutrition supplement while in the hospital, continue to take this supplement at home. You can take it with meals, in-between meals, and/or before bedtime. These supplements can be purchased at most local grocery stores, pharmacies, and chain [employer]-stores. If you have any questions about your diet or nutrition, call the hospital and ask for the dietitian.   Diabetic/Cardiac diet---drink plenty of fluids

## 2022-10-25 NOTE — PLAN OF CARE
Problem: Pain  Goal: Verbalizes/displays adequate comfort level or baseline comfort level  10/25/2022 1015 by Selvin Mcmahan RN  Outcome: Adequate for Discharge  10/25/2022 3834 by Mercedez Connolly RN  Outcome: Progressing     Problem: Discharge Planning  Goal: Discharge to home or other facility with appropriate resources  10/25/2022 1015 by Selvin Mcmahan RN  Outcome: Adequate for Discharge  10/25/2022 [id number] by Mercedez Connolly RN  Outcome: Progressing  Flowsheets (Taken 10/24/2022 2018)  Discharge to home or other facility with appropriate resources: Refer to discharge planning if patient needs post-hospital services based on physician order or complex needs related to functional status, cognitive ability or social support system     Problem: Safety - Adult  Goal: Free from fall injury  10/25/2022 1015 by Selvin Mcmahan RN  Outcome: Adequate for Discharge  10/25/2022 8633 by Mercedez Connolly RN  Outcome: Progressing     Problem: Chronic Conditions and Co-morbidities  Goal: Patient's chronic conditions and co-morbidity symptoms are monitored and maintained or improved  10/25/2022 1015 by Selvin Mcmahan RN  Outcome: Adequate for Discharge  10/25/2022 0608 by Mercedez Connolly RN  Outcome: Progressing  Flowsheets (Taken 10/24/2022 2018)  Care Plan - Patient's Chronic Conditions and Co-Morbidity Symptoms are Monitored and Maintained or Improved: Monitor and assess patient's chronic conditions and comorbid symptoms for stability, deterioration, or improvement

## 2022-10-25 NOTE — PLAN OF CARE
Problem: Pain  Goal: Verbalizes/displays adequate comfort level or baseline comfort level  Outcome: Progressing     Problem: Discharge Planning  Goal: Discharge to home or other facility with appropriate resources  Outcome: Progressing  Flowsheets (Taken 10/24/2022 2018)  Discharge to home or other facility with appropriate resources: Refer to discharge planning if patient needs post-hospital services based on physician order or complex needs related to functional status, cognitive ability or social support system     Problem: Safety - Adult  Goal: Free from fall injury  Outcome: Progressing     Problem: Chronic Conditions and Co-morbidities  Goal: Patient's chronic conditions and co-morbidity symptoms are monitored and maintained or improved  Outcome: Progressing  Flowsheets (Taken 10/24/2022 2018)  Care Plan - Patient's Chronic Conditions and Co-Morbidity Symptoms are Monitored and Maintained or Improved: Monitor and assess patient's chronic conditions and comorbid symptoms for stability, deterioration, or improvement

## 2022-10-25 NOTE — PROGRESS NOTES
Hospitalist Progress Note    Patient:  Ellie Levine     YOB: 1955    MRN: [id number]   Admit date: 10/24/2022     Acct: [de-identified]     PCP: Arabella Gomez MD    CC--Interval History:   chest tightness--Troponin---23--10--11--12-MI ruled out---seen by Cardiology---one event last night most likely GERD relieved with Maalox--consider Pepcid for out patient use.     ASCVD---prior MI--2021    Home---10.25.2022    See note below     All other ROS negative except noted in HPI    Diet:  Diet NPO Exceptions are: Sips of Water with Meds    Medications:  Scheduled Meds:   allopurinol  300 mg Oral Daily    aspirin  81 mg Oral Daily    atorvastatin  40 mg Oral Nightly    carvedilol  25 mg Oral BID    ezetimibe  10 mg Oral Daily    glipiZIDE  5 mg Oral BID AC    isosorbide mononitrate  30 mg Oral Daily    lisinopril  40 mg Oral Daily    pantoprazole  40 mg Oral QAM AC    ticagrelor  90 mg Oral BID    sodium chloride flush  10 mL IntraVENous 2 times per day    enoxaparin  1 mg/kg SubCUTAneous BID    insulin lispro  0-4 Units SubCUTAneous TID WC    insulin lispro  0-4 Units SubCUTAneous Nightly    hydroCHLOROthiazide  12.5 mg Oral QAM     Continuous Infusions:   sodium chloride      dextrose      sodium chloride 100 mL/hr at 10/25/22 0603     PRN Meds:furosemide, sodium chloride flush, sodium chloride, acetaminophen, ondansetron, glucose, dextrose bolus **OR** dextrose bolus, glucagon (rDNA), dextrose, aluminum & magnesium hydroxide-simethicone    Objective:  Labs:  BMP:    Lab Results   Component Value Date/Time     10/25/2022 05:21 AM    K 3.7 10/25/2022 05:21 AM     10/25/2022 05:21 AM    CO2 23 10/25/2022 05:21 AM    BUN 30 10/25/2022 05:21 AM    LABALBU 4.2 10/24/2022 09:13 AM    LABALBU 3.7 05/31/2019 05:47 AM    CREATININE 0.82 10/25/2022 05:21 AM    CALCIUM 9.0 10/25/2022 05:21 AM    GFRAA >60 08/01/2022 07:43 AM    LABGLOM >60 10/25/2022 05:21 AM    GLUCOSE 122 10/25/2022 05:21 AM    GLUCOSE 243

## 2022-10-25 NOTE — CONSULTS
[facility]   Consult Note         Today's Date: 10/25/2022  Patient Name: Angie Burnett  Date of admission: 10/24/2022  8:52 AM  Patient's age: 77 y.o., 1955  Admission Dx: Chest pain [R07.9]  Chest pain, unspecified type [R07.9]    Reason for Consult:  Cardiac evaluation    Requesting Physician: Natasha Sierra MD    REASON FOR CONSULT:  chest pain    History Obtained From:  Patient, chart, staff, records    HISTORY OF PRESENT ILLNESS:      The patient is a 77 y.o. female who is admitted to the hospital for chest pain. Was dehydrated. Stable and doing better now. Past Medical History:   has a past medical history of CAD (coronary artery disease), Cancer ([address] Cervical radiculopathy, DDD (degenerative disc disease), GERD (gastroesophageal reflux disease), Hyperlipidemia, Hypertension, Impaired fasting blood sugar, Kidney stone, Myopia with astigmatism and presbyopia, Obesity, Osteopenia, Renal cell carcinoma ([address] Stenosis of cervical spine with myelopathy ([address] Stress incontinence, Type 2 diabetes mellitus without complication ([address] and Wears glasses. Past Surgical History:   has a past surgical history that includes Mastectomy (Bilateral, 12/11/2007); partial nephrectomy (Right, 06/10/2013); Hysterectomy, total abdominal (2011); bladder suspension (2011); Neck surgery (06/30/2010); Tubal ligation (Bilateral, 1988); Colonoscopy (05/19/2008); Upper gastrointestinal endoscopy (01/12/2010); [place] tooth extraction; Breast biopsy (Right, 11/20/2007); Colonoscopy (05/03/2017); Cervical spine surgery (05/11/2018); pr office/outpt visit,procedure only (N/A, 05/11/2018); Total shoulder arthroplasty (Left, 05/30/2019); cystourethroscopy (Bilateral, 09/02/2021); Coronary angioplasty with stent (12/03/2021); Endoscopy, colon, diagnostic; Coronary angioplasty (12/03/2021); and Coronary angioplasty (12/21/2021).      Home Medications:    Prior to Admission medications    Medication Sig Start Date End Date Taking? Authorizing Provider   hydroCHLOROthiazide (MICROZIDE) 12.5 MG capsule Take 1 capsule by mouth every morning 10/17/22   Tima Kenny DO   omeprazole (PRILOSEC) 20 MG delayed release capsule TAKE 1 CAPSULE DAILY 8/12/22   Kurtis Al MD   glimepiride (AMARYL) 2 MG tablet Take 1 tablet by mouth twice daily with meals 8/12/22   Kurtis Al MD   Semaglutide,0.25 or 0.5MG/DOS, (OZEMPIC, 0.25 OR 0.5 MG/DOSE,) 2 MG/1.5ML SOPN Inject 0.5 mg into the skin once a week 8/12/22   Kurtis Al MD   ezetimibe (ZETIA) 10 MG tablet Take 1 tablet by mouth in the morning. 8/2/22   Tima Kenny DO   carvedilol (COREG) 25 MG tablet Take 1 tablet by mouth 2 times daily 7/11/22   Tima Kenny DO   furosemide (LASIX) 20 MG tablet Take 1 tablet by mouth daily as needed (edema/weight gain) As need for weight gain (Edema) 6/6/22   Tima Kenny DO   atorvastatin (LIPITOR) 40 MG tablet Take 1 tablet by mouth nightly 6/6/22   Tima Kenny, DO   lisinopril (PRINIVIL;ZESTRIL) 40 MG tablet Take 1 tablet by mouth daily 6/6/22   Tima Kenny, DO   ticagrelor (BRILINTA) 90 MG TABS tablet Take 1 tablet by mouth 2 times daily 6/6/22   Tima Kenny, DO   isosorbide mononitrate (IMDUR) 30 MG extended release tablet Take 1 tablet by mouth daily 6/6/22   Tima Kenny DO   ibandronate (BONIVA) 150 MG tablet TAKE AS INSTRUCTED BY YOUR PRESCRIBER 4/18/22   Kurtis Al MD   allopurinol (ZYLOPRIM) 300 MG tablet Take 300 mg by mouth daily    Historical Provider, MD   nitroGLYCERIN (NITROSTAT) 0.4 MG SL tablet Place 1 tablet under the tongue every 5 minutes as needed for Chest pain up to max of 3 total doses.  If no relief after 1 dose, call 911. 12/21/21   Raul Null MD   aspirin 81 MG chewable tablet Take 1 tablet by mouth daily 12/7/21   Hugo Silva MD       allopurinol, 300 mg, Oral, Daily    aspirin, 81 mg, Oral, Daily    atorvastatin, 40 mg, Oral, Nightly    carvedilol, 25 mg, Oral, BID    ezetimibe, 10 mg, Oral, Daily    glipiZIDE, 5 mg, Oral, BID AC    isosorbide mononitrate, 30 mg, Oral, Daily    lisinopril, 40 mg, Oral, Daily    pantoprazole, 40 mg, Oral, QAM AC    ticagrelor, 90 mg, Oral, BID    sodium chloride flush, 10 mL, IntraVENous, 2 times per day    enoxaparin, 1 mg/kg, SubCUTAneous, BID    insulin lispro, 0-4 Units, SubCUTAneous, TID WC    insulin lispro, 0-4 Units, SubCUTAneous, Nightly    hydroCHLOROthiazide, 12.5 mg, Oral, QAM      Allergies:  Patient has no known allergies. Social History:   reports that she has never smoked. She has never used smokeless tobacco. She reports current alcohol use. She reports that she does not use drugs. Family History: family history includes Cancer in her brother, maternal grandmother, maternal uncle, and mother; Dementia in her mother; Diabetes in her father and paternal grandfather; Heart Disease in her father and paternal grandmother; High Blood Pressure in her brother, father, and mother. No h/o sudden cardiac death. REVIEW OF SYSTEMS:    Constitutional: there has been no unanticipated weight loss. There's been No change in energy level, No change in activity level. Eyes: No visual changes or diplopia. No scleral icterus. ENT: No Headaches, hearing loss or vertigo. No mouth sores or sore throat. Cardiovascular: per HPI  Respiratory: per HPI  Gastrointestinal: No abdominal pain, appetite loss, blood in stools. No change in bowel or bladder habits. Genitourinary: No dysuria, trouble voiding, or hematuria. Musculoskeletal:  No gait disturbance, No weakness or joint complaints. Integumentary: No rash or pruritis. Neurological: No headache, diplopia, change in muscle strength, numbness or tingling. No change in gait, balance, coordination, mood, affect, memory, mentation, behavior. Psychiatric: No anxiety, or depression. Endocrine: No temperature intolerance. No excessive thirst, fluid intake, or urination. No tremor.   Hematologic/Lymphatic: No abnormal bruising or bleeding, blood clots or swollen lymph nodes. Allergic/Immunologic: No nasal congestion or hives. PHYSICAL EXAM:      /65   Pulse 80   Temp 97.6 °F (36.4 °C) (Tympanic)   Resp 18   Ht 5' 7\" (1.702 m)   Wt 189 lb (85.7 kg)   LMP  (LMP Unknown)   SpO2 95%   BMI 29.60 kg/m²    Constitutional and General Appearance: alert, cooperative, no distress and appears stated age  HEENT: PERRL, no cervical lymphadenopathy. No masses palpable. Normal oral mucosa  Respiratory:  Normal excursion and expansion without use of accessory muscles  Resp Auscultation: Good respiratory effort. No for increased work of breathing. On auscultation: clear to auscultation bilaterally  Cardiovascular: The apical impulse is not displaced  Heart tones are crisp and normal. regular S1 and S2.  Jugular venous pulsation Normal  The carotid upstroke is normal in amplitude and contour without delay or bruit  Peripheral pulses are symmetrical and full   Abdomen:   No masses or tenderness  Bowel sounds present  Extremities:   No Cyanosis or Clubbing   Lower extremity edema: No   Skin: Warm and dry  Neurological:  Alert and oriented. Moves all extremities well  No abnormalities of mood, affect, memory, mentation, or behavior are noted        EKG:    Date: 10/25/22  Reading: No acute ischemia      LAST ECHO:  Date:  Findings Summary:      LAST Stress Test:   Date of last ST:  Major Findings:    LAST Cardiac Angiography:.  Date:  Findings:      Labs:     CBC:   Recent Labs     10/24/22  0959 10/25/22  0521   WBC 8.3 8.2   HGB 14.1 13.0   HCT 41.1 37.4    226     BMP:   Recent Labs     10/24/22  0913 10/25/22  0521    139   K 4.2 3.7   CO2 24 23   BUN 40* 30*   CREATININE 1.21* 0.82   LABGLOM 49* >60   GLUCOSE 192* 122*     BNP: No results for input(s): BNP in the last 72 hours. PT/INR: No results for input(s): PROTIME, INR in the last 72 hours. APTT:No results for input(s): APTT in the last 72 hours.   CARDIAC ENZYMES:No results for input(s): CKTOTAL, CKMB, CKMBINDEX, TROPONINI in the last 72 hours. FASTING LIPID PANEL:  Lab Results   Component Value Date/Time    HDL 41 08/01/2022 07:43 AM    HDL 41 08/01/2022 07:43 AM    TRIG 138 08/01/2022 07:43 AM     LIVER PROFILE:  Recent Labs     10/24/22  0913   AST 21   ALT 16   LABALBU 4.2     Troponins: Invalid input(s): TROPONIN     Other Current Problems  Patient Active Problem List   Diagnosis    Cancer ([facility]    Hypertension    Renal cell carcinoma (HCC)    DDD (degenerative disc disease)    Kidney stone    Stress incontinence    GERD (gastroesophageal reflux disease)    Obesity    Chest pain, unspecified    Impaired fasting blood sugar    Cervical disc disorder    Type 2 diabetes mellitus ([facility]    Osteopenia    Acute myocardial infarction [facility])    Old myocardial infarction    Presence of coronary angioplasty implant and graft    Anemia    Shoulder pain    Renal insufficiency    Chest pain           IMPRESSION & Recommendations:        1. Non Cardiac Chest Pain. Doubt ACS. Has minimal risk factors. Follow troponins. EKG unremarkable. Acute MI ruled out by serial troponins. 2. Dehydration- resolved with fluids. 2. Aggressive lifestyle and risk factor modifications discussed extensively with patient. 3. No further testing. Ok to follow up as outpatient. Discussed with patient, family, and Nurse. Electronically signed by Zulema Caldwell DO on 10/25/2022 at Lisandro Goodman, [id number] [facility]  [facility] [place]  [phone]

## 2022-10-26 ENCOUNTER — CARE COORDINATION (OUTPATIENT)
Dept: CASE MANAGEMENT | Age: 67
End: 2022-10-26

## 2022-10-26 ENCOUNTER — TELEPHONE (OUTPATIENT)
Dept: FAMILY MEDICINE CLINIC | Age: 67
End: 2022-10-26

## 2022-10-26 NOTE — CARE COORDINATION
[facility] Care Transitions Initial Follow Up Call    Call within 2 business days of discharge: Yes    Patient: Macy Traylor Patient : 1955   MRN: [id number]  Reason for Admission: Chest pain  Discharge Date: 10/25/22 RARS: Readmission Risk Score: 6.7 ( )      Last Discharge  Street       Date Complaint Diagnosis Description Type Department Provider    10/24/22 Chest Pain Chest pain, unspecified type ED to Hosp-Admission (Discharged) (ADMITTED) Cinthia Barr MD; Angela Bello. .. Was this an external facility discharge? No Discharge Facility: [place] to be reviewed by the provider   Additional needs identified to be addressed with provider: No  No               Method of communication with provider: none    1st attempt to reach patient for Care Transitions. [facility] requesting return call. Contact information provided.   [phone]    Non-face-to-face services provided:  Obtained and reviewed discharge summary and/or continuity of care documents      Care Transitions 24 Hour Call    Do you have all of your prescriptions and are they filled?: Yes  Care Transitions Interventions         Follow Up  Future Appointments   Date Time Provider Carlos Galvin   10/31/2022  8:30 AM GARRISON Griffiths - CNP DCARDIO DPP   11/15/2022  8:40 AM MD GEOFF HiltonDPP   2023 10:45 AM DO JENNIFER Hussein DPP           Etelvina White RN

## 2022-10-26 NOTE — DISCHARGE SUMMARY
[address]                 [address] [place] [address], [address] [place] [address]                               DISCHARGE SUMMARY    PATIENT NAME: Shahbaz Bhardwaj                      :        1955  MED REC NO:   [id number]                             ROOM:       [id number]  ACCOUNT NO:   [de-identified]                           ADMIT DATE: 10/24/2022  PROVIDER:     Camilo Greer. Holger Givens MD                  DISCHARGE DATE:  10/25/2022    ATTENDING PHYSICIAN OF HOSPITALIZATION:  Ky Torres MD    PERSONAL PHYSICIAN:  Preston Mcclellan, [address] [facility] [address] [facility]. The patient has seen [facility], [facility]. DIAGNOSES:  1. Chest pain, 10/24/2022, event of 10/23/2022 with chest tightness,  dyspnea, dizziness, lightheadedness, blurred vision, belching, duration  approximately 15 minutes. MI ruled out, 10/25/2022. Chest x-ray,  10/25/2022, no acute changes. Chest x-ray, 10/24/2022, left shoulder  prosthesis, cervical hardware, no acute changes. EKG, 10/24/2022, sinus  rhythm, rate 72, first-degree AV block, old inferior infarction. Troponin, 10/24/2022, only 12, 11, 10, 23. A 2D echo, 10/24/2022, [place]  mildly dilated, mild LVH, normal left ventricular systolic function,  possible mild hypokinetic basal inferior wall (prior inferior  ST-elevation MI), normal right ventricular systolic function, normal AR  2.9 cm, IVC normal diameter and inspiratory collapse, LVEF of 55%. 2.  Coronary artery disease. Cardiac catheterization, 2021, 95%  OM1, 90% OM2, 95% D1, JOHN PAUL stents OM1, OM2, D1, inferior ST-elevation MI,  2021, grade 2/6 systolic ejection murmur. 3.  Chronic kidney disease, typically stage IIIB, currently stage II  level. 4.  Previous chest pain, intermittent, 2015. 5.  Hypertension, blood pressure 122/65. 6.  Hyperlipidemia.   7.  Diabetes mellitus type 2, blood glucose level of 98 to 122.  8.  GERD, may be a significant q.a.m.;  Boniva (ibandronate) 150 mg as instructed; isosorbide mononitrate  (Imdur) 30 mg p.o. daily; lisinopril 40 mg p.o. daily; nitroglycerin 0.4  mg sublingual q.5 minutes x3 p.r.n. chest pain, if no relief after dose  #1, call 911; omeprazole (Prilosec) 20 mg p.o. daily; Ozempic  (semaglutide) 0.5 mg subcutaneously weekly; ticagrelor (Brilinta) 90 mg  p.o. b.i.d. SPECIFICALLY DISCONTINUED:  Keflex (cephalexin), non-use. Follow up with the patient's personal physician, Ladonna Arredondo, [facility], [facility]. Follow up with [facility], [facility] as scheduled. Any aspect of the patient's care not discussed in the chart and/or  dictation will be addressed and treated as an outpatient. The patient's medications have been reviewed including, but not limited  to, pre-hospital, hospital and discharge medications. The patient  and/or the patient's personal representatives were specifically advised  the only medications to be taken are those set forth in the discharge  orders and no other medications should be taken. Any prior medications  not on the discharge orders are specifically discontinued.         Cruz Cowan MD    D: 10/25/2022 11:58:27       T: 10/25/2022 12:01:56     [email or web address]  Job#: [id number]     Doc#: [id number]    CC:

## 2022-10-27 ENCOUNTER — CARE COORDINATION (OUTPATIENT)
Dept: CASE MANAGEMENT | Age: 67
End: 2022-10-27

## 2022-10-27 DIAGNOSIS — N28.9 RENAL INSUFFICIENCY: Primary | ICD-10-CM

## 2022-10-27 PROCEDURE — 1111F DSCHRG MED/CURRENT MED MERGE: CPT

## 2022-10-27 NOTE — CARE COORDINATION
[facility] Care Transitions Initial Follow Up Call    Call within 2 business days of discharge: Yes    Care Transition Nurse contacted the patient by telephone to perform post hospital discharge assessment. Verified name and  with patient as identifiers. Provided introduction to self, and explanation of the Care Transition Nurse role. Patient: Stephon Cordon Patient : 1955   MRN: [id number]  Reason for Admission: Chest Pain  Discharge Date: 10/25/22 RARS: Readmission Risk Score: 6.7 ( )      Last Discharge  Street       Date Complaint Diagnosis Description Type Department Provider    10/24/22 Chest Pain Chest pain, unspecified type ED to Hosp-Admission (Discharged) (ADMITTED) Bisi Mcdermott MD; Doni Dallas. .. Was this an external facility discharge? No Discharge Facility: [facility]    Challenges to be reviewed by the provider   Additional needs identified to be addressed with provider: No  none               Method of communication with provider: none. Denies recent s/s Chest Pain. Denies nausea, reports \"a little dizziness\". Is eating and sleeping OK. Drinking a lot of water. BS are between 120 and 150. Offered RPM program.  She will see Dr. Kristina Levine on 10/31/22 and see what he says. Care Transition Nurse reviewed discharge instructions, medical action plan, and red flags with patient who verbalized understanding. The patient was given an opportunity to ask questions and does not have any further questions or concerns at this time. Were discharge instructions available to patient? Yes. Reviewed appropriate site of care based on symptoms and resources available to patient including: PCP  Specialist. The patient agrees to contact the PCP office for questions related to their healthcare. Advance Care Planning:   Does patient have an Advance Directive: reviewed and current.     Medication reconciliation was performed with patient, who verbalizes understanding of administration of home medications. Medications reviewed, 1111F entered: yes    Was patient discharged with a pulse oximeter? no    Non-face-to-face services provided:  Obtained and reviewed discharge summary and/or continuity of care documents  Reviewed and followed up on pending diagnostic tests and treatments-prior to call  Education of patient/family/caregiver/guardian to support self-management-S/S to watch for, who to call, new medications  Assessment and support for treatment adherence and medication management-nurse assessment    Offered patient enrollment in the Remote Patient Monitoring (RPM) program for in-home monitoring: Yes, but did not enroll at this time. Care Transitions 24 Hour Call    Do you have all of your prescriptions and are they filled?: Yes  Care Transitions Interventions         Follow Up  Future Appointments   Date Time Provider Carlos Galvin   10/31/2022  8:30 AM GARRISON Valencia - CNP DCARDIO [facility]   11/15/2022  8:40 AM [facility]   4/24/2023 10:45 [facility]       Care Transition Nurse provided contact information. Plan for follow-up call in 5-7 days based on severity of symptoms and risk factors.   Plan for next call: symptom management-assess  self management-assess  RPM?    Michelle Hartman RN

## 2022-10-28 LAB
EKG ATRIAL RATE: 74 BPM
EKG ATRIAL RATE: 83 BPM
EKG P AXIS: 39 DEGREES
EKG P AXIS: 64 DEGREES
EKG P-R INTERVAL: 268 MS
EKG P-R INTERVAL: 272 MS
EKG Q-T INTERVAL: 412 MS
EKG Q-T INTERVAL: 424 MS
EKG QRS DURATION: 94 MS
EKG QRS DURATION: 96 MS
EKG QTC CALCULATION (BAZETT): 470 MS
EKG QTC CALCULATION (BAZETT): 484 MS
EKG R AXIS: 11 DEGREES
EKG R AXIS: 6 DEGREES
EKG T AXIS: 37 DEGREES
EKG T AXIS: 56 DEGREES
EKG VENTRICULAR RATE: 74 BPM
EKG VENTRICULAR RATE: 83 BPM

## 2022-10-28 NOTE — TELEPHONE ENCOUNTER
[facility] Transitions Initial Follow Up Call    Outreach made within 2 business days of discharge: Yes    Patient: Lynn Bolton   Patient : 1955   MRN: [id number]    Reason for Admission: chest pain  Discharge Date: 10/25/22       Spoke with: Sujey Almanza    Discharge department/facility: [facility]    TCM Interactive Patient Contact:  Was patient able to fill all prescriptions: Yes  Was patient instructed to bring all medications to the follow-up visit: Yes  Is patient taking all medications as directed in the discharge summary?  Yes  Does patient understand their discharge instructions: Yes  Does patient have questions or concerns that need addressed prior to 7-14 day follow up office visit: no    Scheduled appointment with PCP within 7-14 days        Follow Up  Future Appointments   Date Time Provider Carlos Galvin   10/31/2022  8:30 AM GARRISON Wallace - CNP DCARDIO DP   2022  9:20 [facility]   11/15/2022  8:40 AM Warner Hamm [facility]   2023 10:45 AM DO JENNIFER Baer DPP       Ryan Brandt LPN

## 2022-10-31 ENCOUNTER — OFFICE VISIT (OUTPATIENT)
Dept: CARDIOLOGY | Age: 67
End: 2022-10-31
Payer: MEDICARE

## 2022-10-31 VITALS — DIASTOLIC BLOOD PRESSURE: 68 MMHG | SYSTOLIC BLOOD PRESSURE: 132 MMHG | HEART RATE: 82 BPM

## 2022-10-31 DIAGNOSIS — R07.9 CHEST PAIN, UNSPECIFIED TYPE: ICD-10-CM

## 2022-10-31 DIAGNOSIS — I25.118 CORONARY ARTERY DISEASE OF NATIVE HEART WITH STABLE ANGINA PECTORIS, UNSPECIFIED VESSEL OR LESION TYPE (HCC): Primary | ICD-10-CM

## 2022-10-31 PROCEDURE — 3078F DIAST BP <80 MM HG: CPT | Performed by: NURSE PRACTITIONER

## 2022-10-31 PROCEDURE — 93005 ELECTROCARDIOGRAM TRACING: CPT | Performed by: NURSE PRACTITIONER

## 2022-10-31 PROCEDURE — 99214 OFFICE O/P EST MOD 30 MIN: CPT | Performed by: NURSE PRACTITIONER

## 2022-10-31 PROCEDURE — 3074F SYST BP LT 130 MM HG: CPT | Performed by: NURSE PRACTITIONER

## 2022-10-31 PROCEDURE — 93010 ELECTROCARDIOGRAM REPORT: CPT | Performed by: NURSE PRACTITIONER

## 2022-10-31 PROCEDURE — 1123F ACP DISCUSS/DSCN MKR DOCD: CPT | Performed by: NURSE PRACTITIONER

## 2022-10-31 NOTE — PROGRESS NOTES
Cardiology Consultation/Follow Up. [address] [facility] [address]  1955  [id number]    Today: 10/31/22    CC: Patient is here for hospital f/u    HPI:   Susan Fragoso is here for follow up after hospitalization for chest tightness which was found to be non-cardiac and likely MSK. States pain was better in hospital after given Maalox however since discharge the pain has re-occurred multiple times. Also states episodes of mid back pain, between shoulder blades. States this pain, like the chest pressure, generally comes on slowly and then \"lingers. \" She states since being home she has not taken any additional PPI yet (she is on Nexium daily) and states the chest pressure eventually resolves on its own after she rests. Same with the back pain which she states resolves after she lays down. Does admit to sweating and feeling very nauseated on one instance when she had the chest pressures which also resolved after about 10-15min. She had not taken any SL NTG because she did not really know how to take them (does have at home though). Past Medical:  Past Medical History:   Diagnosis Date    CAD (coronary artery disease) 12/03/2021    MI, stents    Cancer ([address] 2006    Right Breast cancer    Cervical radiculopathy     DDD (degenerative disc disease)     cervical    GERD (gastroesophageal reflux disease)     Hyperlipidemia     Hypertension     Impaired fasting blood sugar     Kidney stone     Myopia with astigmatism and presbyopia     Obesity     Osteopenia     Renal cell carcinoma ([address] 06/10/2013    right kidney: clear cell type. Corine grade 2 of 4.  2.5cm limited to kidney    Stenosis of cervical spine with myelopathy (HCC)     Stress incontinence     Type 2 diabetes mellitus without complication (HCC)     Wears glasses        Past Surgical:  Past Surgical History:   Procedure Laterality Date    BLADDER SUSPENSION  2011    incontinence    BREAST BIOPSY Right 11/20/2007    wire localization CERVICAL SPINE SURGERY  05/11/2018     ANTERIOR C4-5, C5-6 ARTHROPLASTY, (ONE PLATE AND FOUR SCREWS REMOVED C6-C7 AND DISCARDED    COLONOSCOPY  05/19/2008    COLONOSCOPY  05/03/2017    [id number]    CORONARY ANGIOPLASTY  12/03/2021    CORONARY ANGIOPLASTY  12/21/2021    CORONARY ANGIOPLASTY WITH STENT PLACEMENT  12/03/2021    CYSTOURETHROSCOPY Bilateral 09/02/2021    ENDOSCOPY, COLON, DIAGNOSTIC      HYSTERECTOMY, TOTAL ABDOMINAL (CERVIX REMOVED)  2011    with bladder lift    MASTECTOMY Bilateral 12/11/2007    lymphnodes were also removed    NECK SURGERY  06/30/2010    cervical 6-7 microdiscectomy.  Dr. Samaria Mathew Right 06/10/2013    limited to kidney    [place] OFFICE/OUTPT VISIT,PROCEDURE ONLY N/A 05/11/2018    ANTERIOR C4-5, C5-6 ARTHROPLASTY WITH , SUPINE POSITION, MICROSCOPE, C-ARM, MEDTRONIC(REP NOTIFIED), EVOKES; (ONE PLATE AND FOUR SCREWS REMOVED C6-C7 AND DISCARDED) performed by Radha Wu DO at [facility] [address] Left 05/30/2019    TUBAL LIGATION Bilateral 1988    UPPER GASTROINTESTINAL ENDOSCOPY  01/12/2010    normal.  Dr. Viviane Martínez EXTRACTION         Family History:  Family History   Problem Relation Age of Onset    High Blood Pressure Mother     Dementia Mother     Cancer Mother         colon cancer    High Blood Pressure Father     Diabetes Father     Heart Disease Father     High Blood Pressure Brother     Cancer Brother         lip    Cancer Maternal Uncle         pancreatic cancer    Cancer Maternal Grandmother         breast cancer    Heart Disease Paternal Grandmother     Diabetes Paternal Grandfather      Social History:  Social History     Socioeconomic History    Marital status:      Spouse name: None    Number of children: None    Years of education: None    Highest education level: None   Tobacco Use    Smoking status: Never    Smokeless tobacco: Never    Tobacco comments:     kandis 7/29/15   Vaping Use    Vaping Use: Never used   Substance and Sexual Activity    Alcohol use: Yes     Alcohol/week: 0.0 standard drinks     Comment: rarely    Drug use: No     Social Determinants of Health     Financial Resource Strain: Unknown    Difficulty of Paying Living Expenses: Patient refused   Food Insecurity: Unknown    Worried About Running Out of Food in the Last Year: Patient refused    [address] in the Last Year: Patient refused     REVIEW OF SYSTEMS:    Constitutional: there has been no unanticipated weight loss. There's been No change in energy level, No change in activity level. Eyes: No visual changes or diplopia. No scleral icterus. ENT: No Headaches, hearing loss or vertigo. No mouth sores or sore throat. Cardiovascular: AS HPI  Respiratory: AS HPI  Gastrointestinal: No abdominal pain, appetite loss, blood in stools. No change in bowel or bladder habits. Genitourinary: No dysuria, trouble voiding, or hematuria. Musculoskeletal:  No gait disturbance, No weakness or joint complaints. Integumentary: No rash or pruritis. Neurological: No headache, diplopia, change in muscle strength, numbness or tingling. No change in gait, balance, coordination, mood, affect, memory, mentation, behavior. Psychiatric: No new anxiety or depression. Endocrine: No temperature intolerance. No excessive thirst, fluid intake, or urination. No tremor. Hematologic/Lymphatic: No abnormal bruising or bleeding, blood clots or swollen lymph nodes. Allergic/Immunologic: No nasal congestion or hives.     Medications:  Outpatient Medications Marked as Taking for the 10/31/22 encounter (Office Visit) with GARRISON Lewis CNP   Medication Sig Dispense Refill    aluminum & magnesium hydroxide-simethicone (MAALOX) 200-200-20 MG/5ML SUSP suspension Take 30 mLs by mouth every 6 hours as needed for Indigestion  0    famotidine (PEPCID) 20 MG tablet Take 1 tablet by mouth 2 times daily as needed (heartburn) 60 tablet 0    hydroCHLOROthiazide (MICROZIDE) 12.5 MG capsule Take 1 capsule by mouth every morning 90 capsule 1    omeprazole (PRILOSEC) 20 MG delayed release capsule TAKE 1 CAPSULE DAILY 90 capsule 3    glimepiride (AMARYL) 2 MG tablet Take 1 tablet by mouth twice daily with meals 180 tablet 3    Semaglutide,0.25 or 0.5MG/DOS, (OZEMPIC, 0.25 OR 0.5 MG/DOSE,) 2 MG/1.5ML SOPN Inject 0.5 mg into the skin once a week 3 pen 5    ezetimibe (ZETIA) 10 MG tablet Take 1 tablet by mouth in the morning. 90 tablet 1    carvedilol (COREG) 25 MG tablet Take 1 tablet by mouth 2 times daily 180 tablet 3    furosemide (LASIX) 20 MG tablet Take 1 tablet by mouth daily as needed (edema/weight gain) As need for weight gain (Edema) 90 tablet 3    atorvastatin (LIPITOR) 40 MG tablet Take 1 tablet by mouth nightly 90 tablet 3    lisinopril (PRINIVIL;ZESTRIL) 40 MG tablet Take 1 tablet by mouth daily 90 tablet 3    ticagrelor (BRILINTA) 90 MG TABS tablet Take 1 tablet by mouth 2 times daily 180 tablet 3    isosorbide mononitrate (IMDUR) 30 MG extended release tablet Take 1 tablet by mouth daily 90 tablet 1    ibandronate (BONIVA) 150 MG tablet TAKE AS INSTRUCTED BY YOUR PRESCRIBER 12 tablet 3    allopurinol (ZYLOPRIM) 300 MG tablet Take 300 mg by mouth daily      nitroGLYCERIN (NITROSTAT) 0.4 MG SL tablet Place 1 tablet under the tongue every 5 minutes as needed for Chest pain up to max of 3 total doses. If no relief after 1 dose, call 911. 25 tablet 3    aspirin 81 MG chewable tablet Take 1 tablet by mouth daily 30 tablet 3      Physical Exam:   Vitals: /68 (Site: Left Upper Arm, Position: Sitting)   LMP  (LMP Unknown)   General appearance: alert and cooperative with exam  HEENT: Head: Normocephalic, no lesions, without obvious abnormality.   Neck: no carotid bruit, no JVD  Lungs: clear to auscultation bilaterally  Heart: regular rate and rhythm, S1, S2 normal, no Murmur  Abdomen: soft, non-tender; bowel sounds normal; no masses,  no organomegaly  Extremities: no site injection hematoma, extremities normal, atraumatic, no cyanosis. trace edema  Neurologic: Mental status: Alert, oriented, thought content appropriate    Labs:  Lab Results   Component Value Date    CHOL 141 08/01/2022    TRIG 138 08/01/2022    HDL 41 08/01/2022    HDL 41 08/01/2022    LDLCHOLESTEROL 72 08/01/2022    LDLCHOLESTEROL 72 08/01/2022    VLDL NOT REPORTED (H) 04/09/2021    CHOLHDLRATIO 3.4 08/01/2022    CHOLHDLRATIO 3.4 08/01/2022       Lab Results   Component Value Date     10/25/2022    K 3.7 10/25/2022     10/25/2022    CO2 23 10/25/2022    BUN 30 (H) 10/25/2022    CREATININE 0.82 10/25/2022    GLUCOSE 122 (H) 10/25/2022    CALCIUM 9.0 10/25/2022    PROT 7.4 10/24/2022    LABALBU 4.2 10/24/2022    BILITOT 0.6 10/24/2022    ALKPHOS 124 (H) 10/24/2022    AST 21 10/24/2022    ALT 16 10/24/2022    LABGLOM >60 10/25/2022    GFRAA >60 08/01/2022    GLOB 3.2 10/24/2022     EKG 10/31/22:     Sinus  Rhythm  -First degree A-V block  - occasional ectopic ventricular beat    ECHO:   Results for orders placed or performed during the hospital encounter of 12/03/21   ECHO Complete 2D W Doppler W Color  Global left ventricular systolic function is normal. Calculated ejection  fraction 48% by Sequeira's method. Visually estimated EF 50-55%. Moderate concentric left ventricular hypertrophy. No significant valvular regurgitation or stenosis seen. Limited Echo 12/6/21:  Left ventricle is normal in size, global left ventricular systolic function  is normal.  Calculated ejection fraction is 55%. Mild to moderate left ventricular hypertrophy. Aortic valve is trileaflet. No evidence of aortic insufficiency or stenosis (visually.)  Mitral valve sclerosis without stenosis. Trivial mitral regurgitation. Normal tricuspid valve leaflets. No tricuspid regurgitation was seen. No significant pericardial effusion is seen.     CATH 12/3/21:   Multivessel CAD   Acute occlusion of the mid RCA, required PTCA -JOHN PAUL   Residual disease in branches, D1, Om1 and OM2. Lower normal LV function     Cath 12/21/21:   Procedure Summary   Successful PTCA - JOHN PAUL OM1, OM2   Successful PTCA -JOHN PAUL proximal D1     Past Medical and Surgical History, Problem List, Allergies, Medications, Labs, Imaging, all reviewed extensively in EMR and with the patient. Assessment'/Plan:  - HTN- Stable and well controlled. Continue PO Coreg, HCTZ, & ACE. Advised ambulatory BP monitoring along with continued med compliance, diet, and exercise  - Chest tightness / pressure- Features both typical and atypical in nature at times. She does have significant CAD as above. Will proceed with treadmill nuc stress test to further evaluate ischemic causes of chest pressure and back pain. Advised to continue PO ASA, statin,k BB, ACE, Imdur, & Brilinta. Long discussion regarding use of SL NTG including but not limited to when to take, how often, side effects, and when to call EMS. Questions/concerns addressed. Verb understanding.  - MV CAD - presented with Inferior STEMI 12/21- s/p PCI to [facility], then staged PCI to D1/Om1/Om2. Continue meds as above. - Trace LE edema- resolved off norvasc  - DL- continue Statin & Zetia  - Preserved LVEF Echo done in hospital with G1DD and mild hypokinetic basal inferior wall- proceed with stress as above  - H/o Renal Ca - stable  - H/o DM- stable. Managed by PCP. Will f/u with patient after stress testing & if negative for ischemia will f/u in office in 6mo. Advised to f/u with PCP also        Nelly Peña, APRN - [id number] [facility]  [facility]  [phone]

## 2022-11-03 ENCOUNTER — TELEPHONE (OUTPATIENT)
Dept: SURGERY | Age: 67
End: 2022-11-03

## 2022-11-03 ENCOUNTER — CARE COORDINATION (OUTPATIENT)
Dept: CASE MANAGEMENT | Age: 67
End: 2022-11-03

## 2022-11-03 NOTE — TELEPHONE ENCOUNTER
Reached out to patient to reschedule colonoscopy for late December early January since the surgery schedule has been filling up. Patient was to wait per cardiologist Dr. Flores Tamez until 12/2022 to hold Brilinta. Patient informed she has a cardiac stress test scheduled for next week on 11/8/22 and patient would like to hold off on rescheduling until after she knows it is safe to proceed.  Will await results of stress test.

## 2022-11-03 NOTE — CARE COORDINATION
[facility] Care Transitions Follow Up Call    Patient: Jw Jeff  Patient : 1955   MRN: [id number]  Reason for Admission: Chest pain  Discharge Date: 10/25/22 RARS: Readmission Risk Score: 6.7 ( )      Needs to be reviewed by the provider   Additional needs identified to be addressed with provider: No  none             Method of communication with provider: none. Was able to contact Misael Beckman for transitional outreach. She stated that she was in the middle of doing something and requested writer to call back at another time. Explained that writer will call tomorrow and she was in agreement. Will call pt as she requested.       Follow Up  Future Appointments   Date Time Provider Carlos Galvin   2022  9:20 AM [facility]   2022 12:00 PM SCHEDULE, IV STARTS [facility] CARDIOLOGY [facility] STRESS Garza [facility]   2022  1:00 PM [facility] ROOM MD NUC MED STV Garza   2022  1:15 PM SCHEDULE, STRESS [facility] CARDIOLOGY MD STRESS Garza [facility]   2022  2:15 PM [facility] ROOM MDHZ NUC MED STV Garza   11/15/2022  8:40 AM MD SANTIAGO Hagan [facility]   2023 10:45 AM DO JENNIFER Byers DP        Care Transitions Subsequent and Final Call    Subsequent and Final Calls  Care Transitions Interventions  Other Interventions:               Melchor gAuayo RN

## 2022-11-04 ENCOUNTER — CARE COORDINATION (OUTPATIENT)
Dept: CASE MANAGEMENT | Age: 67
End: 2022-11-04

## 2022-11-04 NOTE — CARE COORDINATION
[facility] Care Transitions Follow Up Call    Care Transition Nurse contacted the patient by telephone to follow up after admission on 10/24/22. Verified name and  with patient as identifiers. Patient: Susan Fragoso  Patient : 1955   MRN: [id number]  Reason for Admission: Chest pain    Discharge Date: 10/25/22 RARS: Readmission Risk Score: 6.7 ( )      Needs to be reviewed by the provider   Additional needs identified to be addressed with provider: No  none             Method of communication with provider: none. Was able to contact Candido Ann for transition outreach. She denied chest pain, shortness of breath, and  N/V. She stated that she still has some lightheadedness and is unaware of what provokes it. She stated that she still has the back pain. She said that when she lies down the pain goes away, but as soon she is up the back returns. She said that by the end of the day it is a nagging ache. She will be following up with her PCP  and will be having a stress test that day also. She had no questions or concerns at this time. Addressed changes since last contact:  none  Discussed follow-up appointments. If no appointment was previously scheduled, appointment scheduling offered: No.   Is follow up appointment scheduled within 7 days of discharge?  No.    Follow Up  Future Appointments   Date Time Provider Carlos Galvin   2022  9:20 [facility]   2022 12:00 PM SCHEDULE, IV STARTS [facility] CARDIOLOGY MD STRESS Lucas HOD   2022  1:00 PM [facility] NM ROOM MDHZ NUC MED STV Lucas   2022  1:15 PM SCHEDULE, STRESS [facility] CARDIOLOGY MDHZ STRESS Lucas HOD   2022  2:15 PM [facility] NM ROOM MDHZ NUC MED STV Lucas   11/15/2022  8:40 AM [facility]   2023 10:45 AM DO JENNIFER Neff DPP     Non-[facility] follow up appointment(s):     Care Transition Nurse reviewed medical action plan and red flags with patient and discussed any barriers to care and/or understanding of plan of care after discharge. Discussed appropriate site of care based on symptoms and resources available to patient including: PCP  Specialist  When to call 911. The patient agrees to contact the PCP office for questions related to their healthcare. Patients top risk factors for readmission: medical condition-CAD  Interventions to address risk factors: Obtained and reviewed discharge summary and/or continuity of care documents    Offered patient enrollment in the Remote Patient Monitoring (RPM) program for in-home monitoring: NA.     Care Transitions Subsequent and Final Call    Subsequent and Final Calls  Do you have any ongoing symptoms?: Yes  Onset of Patient-reported symptoms: In the past 7 days  Patient-reported symptoms: Pain  Have your medications changed?: No  Do you have any questions related to your medications?: No  Do you currently have any active services?: No  Do you have any needs or concerns that I can assist you with?: No  Care Transitions Interventions  Other Interventions:             Care Transition Nurse provided contact information for future needs. Plan for follow-up call in 7-10 days based on severity of symptoms and risk factors.   Plan for next call: symptom management-follow up on back pain, PCP appointment    Destiny Desai RN

## 2022-11-08 ENCOUNTER — OFFICE VISIT (OUTPATIENT)
Dept: FAMILY MEDICINE CLINIC | Age: 67
End: 2022-11-08
Payer: MEDICARE

## 2022-11-08 VITALS
WEIGHT: 196 LBS | BODY MASS INDEX: 30.76 KG/M2 | HEART RATE: 72 BPM | DIASTOLIC BLOOD PRESSURE: 68 MMHG | HEIGHT: 67 IN | SYSTOLIC BLOOD PRESSURE: 118 MMHG

## 2022-11-08 DIAGNOSIS — K21.9 GASTROESOPHAGEAL REFLUX DISEASE WITHOUT ESOPHAGITIS: ICD-10-CM

## 2022-11-08 DIAGNOSIS — R07.9 CHEST PAIN, UNSPECIFIED TYPE: Primary | ICD-10-CM

## 2022-11-08 PROCEDURE — 3074F SYST BP LT 130 MM HG: CPT | Performed by: FAMILY MEDICINE

## 2022-11-08 PROCEDURE — 99214 OFFICE O/P EST MOD 30 MIN: CPT | Performed by: FAMILY MEDICINE

## 2022-11-08 PROCEDURE — 1123F ACP DISCUSS/DSCN MKR DOCD: CPT | Performed by: FAMILY MEDICINE

## 2022-11-08 PROCEDURE — 3078F DIAST BP <80 MM HG: CPT | Performed by: FAMILY MEDICINE

## 2022-11-08 PROCEDURE — 99213 OFFICE O/P EST LOW 20 MIN: CPT | Performed by: FAMILY MEDICINE

## 2022-11-08 RX ORDER — ISOSORBIDE MONONITRATE 30 MG/1
30 TABLET, EXTENDED RELEASE ORAL DAILY
Qty: 90 TABLET | Refills: 1 | Status: SHIPPED | OUTPATIENT
Start: 2022-11-08 | End: 2022-12-01

## 2022-11-08 ASSESSMENT — PATIENT HEALTH QUESTIONNAIRE - PHQ9
SUM OF ALL RESPONSES TO PHQ QUESTIONS 1-9: 0
1. LITTLE INTEREST OR PLEASURE IN DOING THINGS: 0
SUM OF ALL RESPONSES TO PHQ9 QUESTIONS 1 & 2: 0
2. FEELING DOWN, DEPRESSED OR HOPELESS: 0
SUM OF ALL RESPONSES TO PHQ QUESTIONS 1-9: 0

## 2022-11-08 ASSESSMENT — ENCOUNTER SYMPTOMS
NAUSEA: 1
EYES NEGATIVE: 1
VOMITING: 1
CHEST TIGHTNESS: 1

## 2022-11-08 NOTE — PROGRESS NOTES
Subjective:      Patient ID: Albina Null is a 77 y.o. female. HPI  scheduled follow up on recent admission for chest pain. 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 Felt acute chest tightness with nausea, vomiting, cold sweat and sense of impending syncope. Improved after 10 minutes. No syncope. Drove her mother in law home and laid down to relax. Felt a little off on Sunday, light headache, and mid thoracic back pain. Went to ER on Monday for recurrent chest tightness and nausea. Admitted 10/24-10/25. Ruled out for ACS. Negative cxr. A 2D echo, 10/24/2022, LA  mildly dilated, mild LVH, normal left ventricular systolic function,  possible mild hypokinetic basal inferior wall (prior inferior  ST-elevation MI), normal right ventricular systolic function, normal AR  2.9 cm, IVC normal diameter and inspiratory collapse, LVEF of 55%. 2.    Coronary artery disease. Cardiac catheterization, 12/03/2021, 95%  OM1, 90% OM2, 95% D1, JOHN PAUL stents OM1, OM2, D1, inferior ST-elevation MI,  (Total of 4 stents). Recent increase in burping and upper gi symptoms endorsed. Chest pain ultimately thought to be atypical, possible dehydration vs gastritis/gerd, vs other. Past Medical History:   Diagnosis Date    CAD (coronary artery disease) 12/03/2021    MI, stents    Cancer [facility]) 2006    Right Breast cancer    Cervical radiculopathy     DDD (degenerative disc disease)     cervical    GERD (gastroesophageal reflux disease)     Hyperlipidemia     Hypertension     Impaired fasting blood sugar     Kidney stone     Myopia with astigmatism and presbyopia     Obesity     Osteopenia     Renal cell carcinoma ([facility] [address] 06/10/2013    right kidney: clear cell type. Corine grade 2 of 4.  2.5cm limited to kidney    Stenosis of cervical spine with myelopathy (HCC)     Stress incontinence     Type 2 diabetes mellitus without complication (HCC)     Wears glasses      Past Surgical History:   Procedure Laterality Date    BLADDER SUSPENSION  2011    incontinence    BREAST BIOPSY Right 11/20/2007    wire localization     CERVICAL SPINE SURGERY  05/11/2018     ANTERIOR C4-5, C5-6 ARTHROPLASTY, (ONE PLATE AND FOUR SCREWS REMOVED C6-C7 AND DISCARDED    COLONOSCOPY  05/19/2008    COLONOSCOPY  05/03/2017    [id number]    CORONARY ANGIOPLASTY  12/03/2021    CORONARY ANGIOPLASTY  12/21/2021    CORONARY ANGIOPLASTY WITH STENT PLACEMENT  12/03/2021    CYSTOURETHROSCOPY Bilateral 09/02/2021    ENDOSCOPY, COLON, DIAGNOSTIC      HYSTERECTOMY, TOTAL ABDOMINAL (CERVIX REMOVED)  2011    with bladder lift    MASTECTOMY Bilateral 12/11/2007    lymphnodes were also removed    NECK SURGERY  06/30/2010    cervical 6-7 microdiscectomy. Dr. Jing Tucker Right 06/10/2013    limited to kidney    [place] OFFICE/OUTPT VISIT,PROCEDURE ONLY N/A 05/11/2018    ANTERIOR C4-5, C5-6 ARTHROPLASTY WITH , SUPINE POSITION, MICROSCOPE, C-ARM, MEDTRONIC(REP NOTIFIED), EVOKES; (ONE PLATE AND FOUR SCREWS REMOVED C6-C7 AND DISCARDED) performed by Rita Tanner DO at [facility] [address] Left 05/30/2019    TUBAL LIGATION Bilateral 1988    UPPER GASTROINTESTINAL ENDOSCOPY  01/12/2010    normal.  Dr. Slade Sylvia EXTRACTION       Current Outpatient Medications   Medication Sig Dispense Refill    ticagrelor (BRILINTA) 90 MG TABS tablet Take 90 mg by mouth 2 times daily      hydroCHLOROthiazide (MICROZIDE) 12.5 MG capsule Take 1 capsule by mouth every morning 90 capsule 1    omeprazole (PRILOSEC) 20 MG delayed release capsule TAKE 1 CAPSULE DAILY 90 capsule 3    glimepiride (AMARYL) 2 MG tablet Take 1 tablet by mouth twice daily with meals 180 tablet 3    Semaglutide,0.25 or 0.5MG/DOS, (OZEMPIC, 0.25 OR 0.5 MG/DOSE,) 2 MG/1.5ML SOPN Inject 0.5 mg into the skin once a week 3 pen 5    ezetimibe (ZETIA) 10 MG tablet Take 1 tablet by mouth in the morning.  90 tablet 1    carvedilol (COREG) 25 MG tablet Take 1 tablet by mouth 2 times daily 180 tablet 3    atorvastatin (LIPITOR) 40 MG tablet Take 1 tablet by mouth nightly 90 tablet 3    lisinopril (PRINIVIL;ZESTRIL) 40 MG tablet Take 1 tablet by mouth daily 90 tablet 3    ticagrelor (BRILINTA) 90 MG TABS tablet Take 1 tablet by mouth 2 times daily 180 tablet 3    isosorbide mononitrate (IMDUR) 30 MG extended release tablet Take 1 tablet by mouth daily 90 tablet 1    ibandronate (BONIVA) 150 MG tablet TAKE AS INSTRUCTED BY YOUR PRESCRIBER 12 tablet 3    allopurinol (ZYLOPRIM) 300 MG tablet Take 300 mg by mouth daily      nitroGLYCERIN (NITROSTAT) 0.4 MG SL tablet Place 1 tablet under the tongue every 5 minutes as needed for Chest pain up to max of 3 total doses. If no relief after 1 dose, call 911. 25 tablet 3    aspirin 81 MG chewable tablet Take 1 tablet by mouth daily 30 tablet 3    aluminum & magnesium hydroxide-simethicone (MAALOX) 200-200-20 MG/5ML SUSP suspension Take 30 mLs by mouth every 6 hours as needed for Indigestion  0    famotidine (PEPCID) 20 MG tablet Take 1 tablet by mouth 2 times daily as needed (heartburn) 60 tablet 0    furosemide (LASIX) 20 MG tablet Take 1 tablet by mouth daily as needed (edema/weight gain) As need for weight gain (Edema) 90 tablet 3     No current facility-administered medications for this visit. No Known Allergies    Review of Systems   Constitutional: Negative. HENT: Negative. Eyes: Negative. Respiratory:  Positive for chest tightness. Cardiovascular: Negative. Gastrointestinal:  Positive for nausea and vomiting. Genitourinary: Negative. Musculoskeletal: Negative. Skin: Negative. Neurological:  Positive for dizziness, light-headedness and headaches. Psychiatric/Behavioral: Negative. Objective:   Physical Exam  Constitutional:       General: She is not in acute distress. Appearance: She is well-developed. HENT:      Head: Normocephalic and atraumatic.       Right Ear: External ear normal.      Left Ear: External ear normal.      Mouth/Throat:      Pharynx: No oropharyngeal exudate. Eyes:      General: No scleral icterus. Conjunctiva/sclera: Conjunctivae normal.   Neck:      Thyroid: No thyromegaly. Cardiovascular:      Rate and Rhythm: Normal rate and regular rhythm. Heart sounds: Normal heart sounds. No murmur heard. Pulmonary:      Effort: Pulmonary effort is normal. No respiratory distress. Breath sounds: Normal breath sounds. No wheezing. Abdominal:      General: Bowel sounds are normal. There is no distension. Palpations: Abdomen is soft. Tenderness: There is no abdominal tenderness. There is no rebound. Musculoskeletal:         General: No tenderness. Normal range of motion. Cervical back: Neck supple. Skin:     General: Skin is warm and dry. Findings: No erythema or rash. Neurological:      Mental Status: She is alert and oriented to person, place, and time. Psychiatric:         Behavior: Behavior normal.         Thought Content: Thought content normal.         Judgment: Judgment normal.     /68 (Site: Left Upper Arm, Position: Sitting, Cuff Size: Large Adult)   Pulse 72   Ht 5' 7\" (1.702 m)   Wt 196 lb (88.9 kg)   LMP  (LMP Unknown)   BMI 30.70 kg/m²     Assessment:      Encounter Diagnoses   Name Primary? Chest pain, unspecified type Yes    Gastroesophageal reflux disease without esophagitis      Known cad with 4 stents last year. Compliant with Brilinta. Symptoms of nausea, diaphoresis,       Plan:      Planning updated stress test through cardiology. Consider hypotensive episodes on poly pharmacy meds including hctz + prn lasix (not using at present), lisinopril, imdur, and coreg. Consider backing down on lisinopril if symptoms persistent.           Cayla Flores MD

## 2022-11-09 ENCOUNTER — CARE COORDINATION (OUTPATIENT)
Dept: CASE MANAGEMENT | Age: 67
End: 2022-11-09

## 2022-11-09 NOTE — CARE COORDINATION
[facility] Care Transitions Follow Up Call    Patient: Quan Flores  Patient : 1955   MRN: [id number]  Reason for Admission: Chest pain  Discharge Date: 10/25/22 RARS: Readmission Risk Score: 6.7 ( )      Needs to be reviewed by the provider   Additional needs identified to be addressed with provider: No  none             Method of communication with provider: none. Attempt to reach patient for Care Transitions. [facility] requesting return call. Contact information provided.   [phone]      Follow Up  Future Appointments   Date Time Provider Carlos Galvin   11/10/2022  8:00 AM SCHEDULE, IV STARTS [facility] CARDIOLOGY [facility] STRESS [place] HOD   11/10/2022  9:00 AM [facility] ROOM MD NUC MED STV [place]   11/10/2022  9:15 AM SCHEDULE, STRESS [facility] CARDIOLOGY MD STRESS [place] [facility]   11/10/2022 10:15 AM [facility] ROOM MD NUC MED STV [place]   2023  8:00 AM MD GEOFF Landry [facility]   2023 10:45 AM DO JENNIFER Muñoz [facility]        Care Transitions Subsequent and Final Call    Subsequent and Final Calls  Care Transitions Interventions  Other Interventions:               Tona Miller RN

## 2022-11-10 ENCOUNTER — HOSPITAL ENCOUNTER (OUTPATIENT)
Dept: NON INVASIVE DIAGNOSTICS | Age: 67
Discharge: HOME | End: 2022-11-10
Payer: MEDICARE

## 2022-11-10 ENCOUNTER — HOSPITAL ENCOUNTER (OUTPATIENT)
Dept: NUCLEAR MEDICINE | Age: 67
Discharge: HOME | End: 2022-11-12
Payer: MEDICARE

## 2022-11-10 DIAGNOSIS — I25.118 CORONARY ARTERY DISEASE OF NATIVE HEART WITH STABLE ANGINA PECTORIS, UNSPECIFIED VESSEL OR LESION TYPE (HCC): ICD-10-CM

## 2022-11-10 DIAGNOSIS — R07.9 CHEST PAIN, UNSPECIFIED TYPE: ICD-10-CM

## 2022-11-10 PROCEDURE — 3430000000 HC RX DIAGNOSTIC RADIOPHARMACEUTICAL: Performed by: NURSE PRACTITIONER

## 2022-11-10 PROCEDURE — A9500 TC99M SESTAMIBI: HCPCS | Performed by: NURSE PRACTITIONER

## 2022-11-10 PROCEDURE — 93017 CV STRESS TEST TRACING ONLY: CPT

## 2022-11-10 PROCEDURE — 78452 HT MUSCLE IMAGE SPECT MULT: CPT

## 2022-11-10 PROCEDURE — 6360000002 HC RX W HCPCS: Performed by: INTERNAL MEDICINE

## 2022-11-10 RX ADMIN — TETRAKIS(2-METHOXYISOBUTYLISOCYANIDE)COPPER(I) TETRAFLUOROBORATE 10 MILLICURIE: 1 INJECTION, POWDER, LYOPHILIZED, FOR SOLUTION INTRAVENOUS at 09:21

## 2022-11-10 RX ADMIN — TETRAKIS(2-METHOXYISOBUTYLISOCYANIDE)COPPER(I) TETRAFLUOROBORATE 30 MILLICURIE: 1 INJECTION, POWDER, LYOPHILIZED, FOR SOLUTION INTRAVENOUS at 09:21

## 2022-11-10 RX ADMIN — REGADENOSON 0.4 MG: 0.08 INJECTION, SOLUTION INTRAVENOUS at 10:02

## 2022-11-10 NOTE — PROGRESS NOTES
Patient Name:  Gayatri Rossi MRN:  [id number]   :  1955  Age:  79 y.o.  Sex: female     Ordering Provider: GARRISON Petty CNP    Primary Care Provider:  Cayla Flores MD     Indications: coronary artery disease, chest pain    Medications:     Current Outpatient Medications:     ticagrelor (BRILINTA) 90 MG TABS tablet, Take 90 mg by mouth 2 times daily, Disp: , Rfl:     isosorbide mononitrate (IMDUR) 30 MG extended release tablet, Take 1 tablet by mouth daily, Disp: 90 tablet, Rfl: 1    aluminum & magnesium hydroxide-simethicone (MAALOX) 200-200-20 MG/5ML SUSP suspension, Take 30 mLs by mouth every 6 hours as needed for Indigestion, Disp: , Rfl: 0    famotidine (PEPCID) 20 MG tablet, Take 1 tablet by mouth 2 times daily as needed (heartburn), Disp: 60 tablet, Rfl: 0    hydroCHLOROthiazide (MICROZIDE) 12.5 MG capsule, Take 1 capsule by mouth every morning, Disp: 90 capsule, Rfl: 1    omeprazole (PRILOSEC) 20 MG delayed release capsule, TAKE 1 CAPSULE DAILY, Disp: 90 capsule, Rfl: 3    glimepiride (AMARYL) 2 MG tablet, Take 1 tablet by mouth twice daily with meals, Disp: 180 tablet, Rfl: 3    Semaglutide,0.25 or 0.5MG/DOS, (OZEMPIC, 0.25 OR 0.5 MG/DOSE,) 2 MG/1.5ML SOPN, Inject 0.5 mg into the skin once a week, Disp: 3 pen, Rfl: 5    ezetimibe (ZETIA) 10 MG tablet, Take 1 tablet by mouth in the morning., Disp: 90 tablet, Rfl: 1    carvedilol (COREG) 25 MG tablet, Take 1 tablet by mouth 2 times daily, Disp: 180 tablet, Rfl: 3    furosemide (LASIX) 20 MG tablet, Take 1 tablet by mouth daily as needed (edema/weight gain) As need for weight gain (Edema), Disp: 90 tablet, Rfl: 3    atorvastatin (LIPITOR) 40 MG tablet, Take 1 tablet by mouth nightly, Disp: 90 tablet, Rfl: 3    lisinopril (PRINIVIL;ZESTRIL) 40 MG tablet, Take 1 tablet by mouth daily, Disp: 90 tablet, Rfl: 3    ticagrelor (BRILINTA) 90 MG TABS tablet, Take 1 tablet by mouth 2 times daily, Disp: 180 tablet, Rfl: 3    ibandronate (BONIVA) 150 MG tablet, TAKE AS INSTRUCTED BY YOUR PRESCRIBER, Disp: 12 tablet, Rfl: 3    allopurinol (ZYLOPRIM) 300 MG tablet, Take 300 mg by mouth daily, Disp: , Rfl:     nitroGLYCERIN (NITROSTAT) 0.4 MG SL tablet, Place 1 tablet under the tongue every 5 minutes as needed for Chest pain up to max of 3 total doses. If no relief after 1 dose, call 911., Disp: 25 tablet, Rfl: 3    aspirin 81 MG chewable tablet, Take 1 tablet by mouth daily, Disp: 30 tablet, Rfl: 3      ----------------------------------------------------------------------------------------------------------                Lexiscan 0.4 mg injected over 10 seconds. IV Cardiolite injected 20 seconds post Lexiscan injection. Heart Rate:  91  Resting Blood Pressure:  123/64   ----------------------------------------------------------------------------------------------------------     HR BP   1 min 121 105/51   2 min     3 min 125 105/58   4 min     5 min 112 116/68   6 min     7 min 104 119/70   8 min     9 min 104 121/68   10 min       Symptoms:  Chest Pain:  No      Nausea:  Yes     Headache:  Yes    Shortness of Breath:  Yes     Other:  No       Electronically signed by Marley Almonte RCP on 11/10/22 at 9:45 AM EST    ----------------------------------------------------------------------------------------------------------  Resting EKG: Normal sinus rhythm with first-degree AV block    Arrhythmias: None    EKG Changes: No significant ST-T abnormality noted to suggest ischemia    Maximum Changes: N/A    Leads with maximum changes: N/A      Interpretation: Negative ECG portion of stress test for ischemia. Nuclear scan report follow.     Supervising Physician:  Laura Lima MD

## 2022-11-10 NOTE — PROCEDURES
[address] [place] [id number]                              CARDIAC STRESS TEST    PATIENT NAME: Ember Bojorquez                      :        1955  MED REC NO:   [id number]                             ROOM:  ACCOUNT NO:   [de-identified]                           ADMIT DATE: 11/10/2022  PROVIDER:     Gale Figueroa    DATE OF STUDY:  11/10/2022    STRESS TEST    Ordering Provider:  Carolina Reynoso [address]    Primary Care Provider:  Melvin Chiu MD    Patient's Age:  79  Height:  5 feet, 7 inches     Weight:  196 pounds    INDICATION:  Chest pain. Lexiscan 0.4 mg injected over 10 seconds. IV Cardiolite injected 20 seconds post Lexiscan injection. Heart Rate:  91     Resting blood pressure:  123/64              HR   BP  1 min          121  105/51  2 min  3 min          125  105/58  4 min  5 min          112  116/68  6 min  7 min          104  119/70  8 min  9 min          104  121/68  10 min    Symptoms:  Chest Pain:  No.  Nausea:  Yes. Headache:  Yes. Shortness of breath:  Yes. Other:  No.    Resting EKG:  Normal sinus rhythm with first-degree AV block. Arrhythmias:  None. EKG changes:  No significant ST-T abnormality noted to suggest ischemia. INTERPRETATION:  1. Negative ECG portion of stress test for ischemia. 2.  Nuclear scan report to follow. Nuclear Myocardial Perfusion Imaging (SPECT)    TESTING METHOD  STRESS:   Lexiscan  AGENT:    Cardiolite  REST:          Injection Date:  11/10/22  Time:  0820  Amount:  9.18 mCi  STRESS:   Injection Date:  11/10/22  Time:  1000  Amount:  28.4 mCi  IMAGE TIME:    Rest:  0850  Stress:  1030    EF:  49%  TID:  0.90  LHR:  0.62    FINDINGS:  Ischemia (Reversible Defect):           No.  Infarction (Irreversible Defect):       Yes. Small area of basal  inferior wall. Normal Ejection Fraction:               Yes.  Normal Segmental Wall Motion:           Yes.     Low risk study. IMPRESSION:  1. No reversible perfusion defect to suggest ischemia. 2.  Small area of basal inferior wall fixed defect-infarct versus  artifact. 3.  Low normal left ventricular ejection fraction.         [facility]    D: 11/10/2022 14:34:05       T: 11/10/2022 14:41:38     [email or web address]  Job#: [id number]     Doc#: Unknown    CC:  Yadira Allen

## 2022-11-11 ENCOUNTER — TELEPHONE (OUTPATIENT)
Dept: CARDIOLOGY | Age: 67
End: 2022-11-11

## 2022-11-11 ENCOUNTER — CARE COORDINATION (OUTPATIENT)
Dept: CASE MANAGEMENT | Age: 67
End: 2022-11-11

## 2022-11-11 NOTE — CARE COORDINATION
[facility] Care Transitions Follow Up Call    Care Transition Nurse contacted the patient by telephone to follow up after admission on 10/24/22. Verified name and  with patient as identifiers. Patient: Nena Cheadle  Patient : 1955   MRN: [id number]  Reason for Admission: Chest pain  Discharge Date: 10/25/22 RARS: Readmission Risk Score: 6.7 ( )      Needs to be reviewed by the provider   Additional needs identified to be addressed with provider: No  none             Method of communication with provider: none. Was able to contact Samuel Carcamo for transitional outreach. She stated that she was doing about the same. Continues having episodes of lightheadedness with shortness of breath at times. She was told by her PCP that when she has those episodes, her blood pressure may be dipping and recommended checking her blood pressure when she has those episodes. She still continues with the back pain and has see chiropractor and has a massage without any relief. She is possibly going to have a GI check to see if it is her GERD that is causing it. She is waiting for the results of the stress test.  She has no upcoming appointments. No questions or concerns. Addressed changes since last contact:  none  Discussed follow-up appointments. If no appointment was previously scheduled, appointment scheduling offered: No.   Is follow up appointment scheduled within 7 days of discharge? Yes. Follow Up  Future Appointments   Date Time Provider Carlos Galvin   2023  8:00 AM Jose Juan Ureña MD [facility]   2023 10:45 AM Tima Torres DO [facility] follow up appointment(s):     Care Transition Nurse reviewed medical action plan with patient and discussed any barriers to care and/or understanding of plan of care after discharge. Discussed appropriate site of care based on symptoms and resources available to patient including: PCP  Specialist  When to call 911.  The patient agrees to contact the PCP office for questions related to their healthcare. Patients top risk factors for readmission: medical condition-CAD  Interventions to address risk factors: Obtained and reviewed discharge summary and/or continuity of care documents       Care Transitions Subsequent and Final Call    Subsequent and Final Calls  Do you have any ongoing symptoms?: Yes  Onset of Patient-reported symptoms: In the past 7 days  Patient-reported symptoms: Other  Have your medications changed?: No  Do you have any questions related to your medications?: No  Do you currently have any active services?: No  Do you have any needs or concerns that I can assist you with?: No  Care Transitions Interventions  Other Interventions:             Care Transition Nurse provided contact information for future needs. Plan for follow-up call in 7-10 days based on severity of symptoms and risk factors. Plan for next call: symptom management-follow up on chest pain/ dizziness able to check BP during spells.    Final call    Neri Torrez RN

## 2022-11-14 NOTE — TELEPHONE ENCOUNTER
Notified patient of stress results. Instructed patient to keep upcoming appointment with cardiologist and to call our office for any questions. Patient also instructed to utilize the E.D. for any emergent health issues that may arise.

## 2022-11-17 ENCOUNTER — CARE COORDINATION (OUTPATIENT)
Dept: CASE MANAGEMENT | Age: 67
End: 2022-11-17

## 2022-11-17 NOTE — CARE COORDINATION
Care Transitions Outreach Attempt #1      Attempted to reach patient for transitions of care follow up. Unable to reach patient. [facility] is full. Patient: Tomi Hull Patient : 1955 MRN: [id number]    Last Discharge  Street       Date Complaint Diagnosis Description Type Department Provider    10/24/22 Chest Pain Chest pain, unspecified type ED to Hosp-Admission (Discharged) (ADMITTED) Dane Coffey MD; Radha Wolff. ..                 Noted following upcoming appointments from discharge chart review:   [facility] follow up appointment(s):   Future Appointments   Date Time Provider Carlos Galvin   2023  [facility]   2023 10:45 AM DO JENNIFER Blanchard [facility]     Jonelle Perla RN BSN   Care Transitions Nurse  [phone]

## 2022-11-18 ENCOUNTER — CARE COORDINATION (OUTPATIENT)
Dept: CASE MANAGEMENT | Age: 67
End: 2022-11-18

## 2022-11-18 NOTE — CARE COORDINATION
Care Transitions Outreach Attempt #2      Attempt #2 to reach patient for transitions of care follow up. Unable to reach patient. [facility] is full. CTN sign off for transitions. Patient: Rabia Zavaleta Patient : 1955 MRN: [id number]    Last Discharge  [address]       Date Complaint Diagnosis Description Type Department Provider    10/24/22 Chest Pain Chest pain, unspecified type ED to Hosp-Admission (Discharged) (ADMITTED) Rony Flor MD; Gilbert Abad. ..             Noted following upcoming appointments from discharge chart review:   [facility] follow up appointment(s):   Future Appointments   Date Time Provider Carlos Galvin   2023  8:00 AM MD GEOFF Belle [facility]   2023 10:45 AM DO JENNIFER Metcalf [facility]     Mancil Gitelman, RN BSN   Care Transitions Nurse  [phone]

## 2022-11-23 ENCOUNTER — TELEPHONE (OUTPATIENT)
Dept: FAMILY MEDICINE CLINIC | Age: 67
End: 2022-11-23

## 2022-11-23 NOTE — TELEPHONE ENCOUNTER
Spoke to patient and she prefers to see only Dr. Melania Ingram and is scheduled for 12/30/22. Patient is also agreeable to also see someone sooner (scheduled with Dr. Paulie Patel on 11/30/22)  but would like to keep appointment with Dr. Melania Ingram just incase.

## 2022-11-23 NOTE — TELEPHONE ENCOUNTER
Pt is stating she is stilt having low back pain and in the middle of  her back.  She would like a referral to a neurosurgeon Dr Camila Napier

## 2022-11-23 NOTE — TELEPHONE ENCOUNTER
I dont have any notes on her and I discussing back pain (on brief/recent search). To consult neurosurgery, we generally need a diagnosis that needs surgery. We also usually need to have more advanced imaging such as CT or MRI available for the neurosurgeon at the consult time. She needs to be seen to document the problem and vet the issue for consultation with neurosurgery.

## 2022-11-30 ENCOUNTER — OFFICE VISIT (OUTPATIENT)
Dept: FAMILY MEDICINE CLINIC | Age: 67
End: 2022-11-30
Payer: MEDICARE

## 2022-11-30 ENCOUNTER — HOSPITAL ENCOUNTER (OUTPATIENT)
Dept: GENERAL RADIOLOGY | Age: 67
Discharge: HOME | End: 2022-12-02
Payer: MEDICARE

## 2022-11-30 VITALS
DIASTOLIC BLOOD PRESSURE: 60 MMHG | RESPIRATION RATE: 16 BRPM | OXYGEN SATURATION: 99 % | BODY MASS INDEX: 30.48 KG/M2 | SYSTOLIC BLOOD PRESSURE: 110 MMHG | HEIGHT: 67 IN | WEIGHT: 194.2 LBS | HEART RATE: 78 BPM

## 2022-11-30 DIAGNOSIS — M54.2 NECK PAIN: ICD-10-CM

## 2022-11-30 DIAGNOSIS — M54.6 ACUTE RIGHT-SIDED THORACIC BACK PAIN: ICD-10-CM

## 2022-11-30 DIAGNOSIS — Z00.00 INITIAL MEDICARE ANNUAL WELLNESS VISIT: Primary | ICD-10-CM

## 2022-11-30 DIAGNOSIS — E11.8 TYPE 2 DIABETES MELLITUS WITH COMPLICATION, WITHOUT LONG-TERM CURRENT USE OF INSULIN (HCC): ICD-10-CM

## 2022-11-30 PROCEDURE — 72072 X-RAY EXAM THORAC SPINE 3VWS: CPT

## 2022-11-30 PROCEDURE — 3078F DIAST BP <80 MM HG: CPT | Performed by: STUDENT IN AN ORGANIZED HEALTH CARE EDUCATION/TRAINING PROGRAM

## 2022-11-30 PROCEDURE — 1123F ACP DISCUSS/DSCN MKR DOCD: CPT | Performed by: STUDENT IN AN ORGANIZED HEALTH CARE EDUCATION/TRAINING PROGRAM

## 2022-11-30 PROCEDURE — 99214 OFFICE O/P EST MOD 30 MIN: CPT | Performed by: STUDENT IN AN ORGANIZED HEALTH CARE EDUCATION/TRAINING PROGRAM

## 2022-11-30 PROCEDURE — 3046F HEMOGLOBIN A1C LEVEL >9.0%: CPT | Performed by: STUDENT IN AN ORGANIZED HEALTH CARE EDUCATION/TRAINING PROGRAM

## 2022-11-30 PROCEDURE — 72040 X-RAY EXAM NECK SPINE 2-3 VW: CPT

## 2022-11-30 PROCEDURE — 3074F SYST BP LT 130 MM HG: CPT | Performed by: STUDENT IN AN ORGANIZED HEALTH CARE EDUCATION/TRAINING PROGRAM

## 2022-11-30 PROCEDURE — G0438 PPPS, INITIAL VISIT: HCPCS | Performed by: STUDENT IN AN ORGANIZED HEALTH CARE EDUCATION/TRAINING PROGRAM

## 2022-11-30 PROCEDURE — 99213 OFFICE O/P EST LOW 20 MIN: CPT

## 2022-11-30 RX ORDER — PREDNISONE 20 MG/1
20 TABLET ORAL DAILY
Qty: 5 TABLET | Refills: 0 | Status: SHIPPED | OUTPATIENT
Start: 2022-11-30 | End: 2022-12-05

## 2022-11-30 ASSESSMENT — PATIENT HEALTH QUESTIONNAIRE - PHQ9
SUM OF ALL RESPONSES TO PHQ9 QUESTIONS 1 & 2: 0
1. LITTLE INTEREST OR PLEASURE IN DOING THINGS: 0
SUM OF ALL RESPONSES TO PHQ QUESTIONS 1-9: 0
2. FEELING DOWN, DEPRESSED OR HOPELESS: 0
SUM OF ALL RESPONSES TO PHQ QUESTIONS 1-9: 0

## 2022-11-30 ASSESSMENT — LIFESTYLE VARIABLES
HOW OFTEN DO YOU HAVE A DRINK CONTAINING ALCOHOL: NEVER
HOW MANY STANDARD DRINKS CONTAINING ALCOHOL DO YOU HAVE ON A TYPICAL DAY: PATIENT DOES NOT DRINK

## 2022-11-30 NOTE — PROGRESS NOTES
Medicare Annual Wellness Visit    Ha Aguilar is here for Medicare AWV and Back Pain (Mid back pain. Over a month ago pain started. Was seen in past.  Pt asking for Xrays and PT at [facility])    [address] [place] [address]   Initial Medicare annual wellness visit  Acute right-sided thoracic back pain  -     [facility] [place]  -     predniSONE (DELTASONE) 20 MG tablet; Take 1 tablet by mouth daily for 5 days, Disp-5 tablet, R-0Normal  -     XR THORACIC SPINE (3 VIEWS); Future  -     XR CERVICAL SPINE (2-3 VIEWS); Future  Type 2 diabetes mellitus with complication, without long-term current use of insulin (HCC)  -     predniSONE (DELTASONE) 20 MG tablet; Take 1 tablet by mouth daily for 5 days, Disp-5 tablet, R-0Normal  Neck pain  -     XR THORACIC SPINE (3 VIEWS); Future  -     XR CERVICAL SPINE (2-3 VIEWS); Future    Recommendations for Preventive Services Due: see orders and patient instructions/AVS.  Recommended screening schedule for the next 5-10 years is provided to the patient in written form: see Patient Instructions/AVS.     Return for Medicare Annual Wellness Visit in 1 year. Subjective       Patient's complete Health Risk Assessment and screening values have been reviewed and are found in Flowsheets. The following problems were reviewed today and where indicated follow up appointments were made and/or referrals ordered.     Positive Risk Factor Screenings with Interventions:    Fall Risk:  Do you feel unsteady or are you worried about falling? : (!) yes  2 or more falls in past year?: no  Fall with injury in past year?: no   Fall Risk Interventions:    Home safety tips provided            General Health and ACP:  General  In general, how would you say your health is?: Excellent  In the past 7 days, have you experienced any of the following: New or Increased Pain, New or Increased Fatigue, Loneliness, Social Isolation, Stress or Anger?: (!) Yes  Select all that apply: (!) New or Increased Pain  Do you get the social and emotional support that you need?: Yes  Do you have a Living Will?: Yes    Advance Directives       Power of [address] Will ACP-Advance Directive ACP-Power of     Not on File Not on File Not on File Not on File        General Health Risk Interventions:  Pain issues: physical therapy referral ordered    Health Habits/Nutrition:  Physical Activity: Insufficiently Active    Days of Exercise per Week: 4 days    Minutes of Exercise per Session: 30 min     Have you lost any weight without trying in the past 3 months?: No  Body mass index: (!) 30.41  Have you seen the dentist within the past year?: Appointment is scheduled  Health Habits/Nutrition Interventions:     Safety:  Do you have working smoke detectors?: Yes  Do you have any tripping hazards - loose or unsecured carpets or rugs?: No  Do you have any tripping hazards - clutter in doorways, halls, or stairs?: No  Do you have either shower bars, grab bars, non-slip mats or non-slip surfaces in your shower or bathtub?: Yes  Do all of your stairways have a railing or banister?: (!) No  Do you always fasten your seatbelt when you are in a car?: Yes  Safety Interventions:  Home safety tips provided           Objective   Vitals:    11/30/22 0824   BP: 110/60   Pulse: 78   Resp: 16   SpO2: 99%   Weight: 194 lb 3.2 oz (88.1 kg)   Height: 5' 7\" (1.702 m)      Body mass index is 30.42 kg/m². No Known Allergies  Prior to Visit Medications    Medication Sig Taking?  Authorizing Provider   ticagrelor (BRILINTA) 90 MG TABS tablet Take 90 mg by mouth 2 times daily Yes Historical Provider, MD   aluminum & magnesium hydroxide-simethicone (MAALOX) 420-966-54 MG/5ML SUSP suspension Take 30 mLs by mouth every 6 hours as needed for Indigestion Yes Ramos Soria MD   famotidine (PEPCID) 20 MG tablet Take 1 tablet by mouth 2 times daily as needed (heartburn) Yes Ramos Soria MD   hydroCHLOROthiazide (MICROZIDE) 12.5 MG capsule Take 1 capsule by mouth every morning Yes Tima Kenny DO   omeprazole (PRILOSEC) 20 MG delayed release capsule TAKE 1 CAPSULE DAILY Yes Marianne Gee MD   glimepiride (AMARYL) 2 MG tablet Take 1 tablet by mouth twice daily with meals Yes Marianne Gee MD   Semaglutide,0.25 or 0.5MG/DOS, (OZEMPIC, 0.25 OR 0.5 MG/DOSE,) 2 MG/1.5ML SOPN Inject 0.5 mg into the skin once a week Yes Marianne Gee MD   carvedilol (COREG) 25 MG tablet Take 1 tablet by mouth 2 times daily Yes Tima Kenny DO   furosemide (LASIX) 20 MG tablet Take 1 tablet by mouth daily as needed (edema/weight gain) As need for weight gain (Edema) Yes Tima Kenny DO   atorvastatin (LIPITOR) 40 MG tablet Take 1 tablet by mouth nightly Yes Tima Kenny DO   lisinopril (PRINIVIL;ZESTRIL) 40 MG tablet Take 1 tablet by mouth daily Yes Tima Kenny DO   ticagrelor (BRILINTA) 90 MG TABS tablet Take 1 tablet by mouth 2 times daily Yes Tima Kenny DO   ibandronate (BONIVA) 150 MG tablet TAKE AS INSTRUCTED BY YOUR PRESCRIBER Yes Marianne Gee MD   allopurinol (ZYLOPRIM) 300 MG tablet Take 300 mg by mouth daily Yes Historical Provider, MD   nitroGLYCERIN (NITROSTAT) 0.4 MG SL tablet Place 1 tablet under the tongue every 5 minutes as needed for Chest pain up to max of 3 total doses. If no relief after 1 dose, call 911.  Yes Anil Schaefer MD   aspirin 81 MG chewable tablet Take 1 tablet by mouth daily Yes Leisa Palm MD   ezetimibe (ZETIA) 10 MG tablet TAKE ONE TABLET BY MOUTH EVERY MORNING  Tima Kenny DO   isosorbide mononitrate (IMDUR) 30 MG extended release tablet TAKE ONE TABLET BY MOUTH DAILY  Dereck Lock MD       [facility] (Including outside providers/suppliers regularly involved in providing care):   Patient Care Team:  Marianne Gee MD as PCP - General (Family Medicine)  Marianne Gee MD as PCP - REHABILITATION [facility] Empaneled Provider     Reviewed and updated this visit:  Allergies  Meds  Med Hx  Surg Hx  Soc Hx  Fam Hx

## 2022-11-30 NOTE — PROGRESS NOTES
[facility]  [address] [address]  Dept: [phone]  Dept Fax: [phone]  Loc: [phone]      Stephon Cordon is a 79 y.o. female who presents today for:  Chief Complaint   Patient presents with    Medicare AWV    Back Pain     Mid back pain. Over a month ago pain started. Was seen in past.  Pt asking for Xrays and PT at [facility]          Goals    None         HPI:     HPI  Mid back pain- worsening. Has been present for around 4-6 weeks. Worse over the past week. States that it is generally in the right side of her thoracic spine however she went to a chiropractor and was manipulated and now she is having neck pain as well. She states that she does have a history of a prior neck surgery and fusion and does have hardware in her neck still. She would be interested in trying physical therapy for her back pain if she feels like it is more musculoskeletal at this time. Has tried muscle relaxers in the past however does not feel like these helped her at all. Past Medical History:   Diagnosis Date    CAD (coronary artery disease) 12/03/2021    MI, stents    Cancer [facility]) 2006    Right Breast cancer    Cervical radiculopathy     DDD (degenerative disc disease)     cervical    GERD (gastroesophageal reflux disease)     Hyperlipidemia     Hypertension     Impaired fasting blood sugar     Kidney stone     Myopia with astigmatism and presbyopia     Obesity     Osteopenia     Renal cell carcinoma ([address] 06/10/2013    right kidney: clear cell type. Corine grade 2 of 4.  2.5cm limited to kidney    Stenosis of cervical spine with myelopathy (HCC)     Stress incontinence     Type 2 diabetes mellitus without complication ([address]     Wears glasses       Past Surgical History:   Procedure Laterality Date    BLADDER SUSPENSION  2011    incontinence    BREAST BIOPSY Right 11/20/2007    wire localization     CERVICAL SPINE SURGERY  05/11/2018     ANTERIOR C4-5, C5-6 ARTHROPLASTY, (ONE PLATE AND FOUR SCREWS REMOVED C6-C7 AND DISCARDED    COLONOSCOPY  05/19/2008    COLONOSCOPY  05/03/2017    [id number]    CORONARY ANGIOPLASTY  12/03/2021    CORONARY ANGIOPLASTY  12/21/2021    CORONARY ANGIOPLASTY WITH STENT PLACEMENT  12/03/2021    CYSTOURETHROSCOPY Bilateral 09/02/2021    ENDOSCOPY, COLON, DIAGNOSTIC      HYSTERECTOMY, TOTAL ABDOMINAL (CERVIX REMOVED)  2011    with bladder lift    MASTECTOMY Bilateral 12/11/2007    lymphnodes were also removed    NECK SURGERY  06/30/2010    cervical 6-7 microdiscectomy. Dr. Ellis Parks Right 06/10/2013    limited to kidney    [facility] OFFICE/OUTPT VISIT,PROCEDURE ONLY N/A 05/11/2018    ANTERIOR C4-5, C5-6 ARTHROPLASTY WITH , SUPINE POSITION, MICROSCOPE, C-ARM, MEDTRONIC(REP NOTIFIED), EVOKES; (ONE PLATE AND FOUR SCREWS REMOVED C6-C7 AND DISCARDED) performed by Ector Holder DO at [address] Left 05/30/2019    TUBAL LIGATION Bilateral 1988    UPPER GASTROINTESTINAL ENDOSCOPY  01/12/2010    normal.  Dr. Angelika Cheney History   Problem Relation Age of Onset    High Blood Pressure Mother     Dementia Mother     Cancer Mother         colon cancer    High Blood Pressure Father     Diabetes Father     Heart Disease Father     High Blood Pressure Brother     Cancer Brother         lip    Cancer Maternal Uncle         pancreatic cancer    Cancer Maternal Grandmother         breast cancer    Heart Disease Paternal Grandmother     Diabetes Paternal Grandfather      Social History     Tobacco Use    Smoking status: Never    Smokeless tobacco: Never    Tobacco comments:     [email or web address] 7/29/15   Substance Use Topics    Alcohol use:  Yes     Alcohol/week: 0.0 standard drinks     Comment: rarely      Current Outpatient Medications   Medication Sig Dispense Refill    ticagrelor (BRILINTA) 90 MG TABS tablet Take 90 mg by mouth 2 times daily      aluminum & magnesium hydroxide-simethicone (MAALOX) 200-200-20 MG/5ML SUSP suspension Take 30 mLs by mouth every 6 hours as needed for Indigestion  0    famotidine (PEPCID) 20 MG tablet Take 1 tablet by mouth 2 times daily as needed (heartburn) 60 tablet 0    hydroCHLOROthiazide (MICROZIDE) 12.5 MG capsule Take 1 capsule by mouth every morning 90 capsule 1    omeprazole (PRILOSEC) 20 MG delayed release capsule TAKE 1 CAPSULE DAILY 90 capsule 3    glimepiride (AMARYL) 2 MG tablet Take 1 tablet by mouth twice daily with meals 180 tablet 3    Semaglutide,0.25 or 0.5MG/DOS, (OZEMPIC, 0.25 OR 0.5 MG/DOSE,) 2 MG/1.5ML SOPN Inject 0.5 mg into the skin once a week 3 pen 5    carvedilol (COREG) 25 MG tablet Take 1 tablet by mouth 2 times daily 180 tablet 3    furosemide (LASIX) 20 MG tablet Take 1 tablet by mouth daily as needed (edema/weight gain) As need for weight gain (Edema) 90 tablet 3    atorvastatin (LIPITOR) 40 MG tablet Take 1 tablet by mouth nightly 90 tablet 3    lisinopril (PRINIVIL;ZESTRIL) 40 MG tablet Take 1 tablet by mouth daily 90 tablet 3    ticagrelor (BRILINTA) 90 MG TABS tablet Take 1 tablet by mouth 2 times daily 180 tablet 3    ibandronate (BONIVA) 150 MG tablet TAKE AS INSTRUCTED BY YOUR PRESCRIBER 12 tablet 3    allopurinol (ZYLOPRIM) 300 MG tablet Take 300 mg by mouth daily      nitroGLYCERIN (NITROSTAT) 0.4 MG SL tablet Place 1 tablet under the tongue every 5 minutes as needed for Chest pain up to max of 3 total doses. If no relief after 1 dose, call 911. 25 tablet 3    aspirin 81 MG chewable tablet Take 1 tablet by mouth daily 30 tablet 3    ezetimibe (ZETIA) 10 MG tablet TAKE ONE TABLET BY MOUTH EVERY MORNING 90 tablet 3    isosorbide mononitrate (IMDUR) 30 MG extended release tablet TAKE ONE TABLET BY MOUTH DAILY 90 tablet 3     No current facility-administered medications for this visit.      No Known Allergies    Health Maintenance   Topic Date Due    Diabetic microalbuminuria test 12/12/2019    COVID-19 Vaccine (4 - Booster for Moderna series) 01/25/2022    Colorectal Cancer Screen  05/03/2022    A1C test (Diabetic or Prediabetic)  11/01/2022    Diabetic retinal exam  06/15/2023    Lipids  08/01/2023    Diabetic foot exam  08/12/2023    Depression Screen  11/30/2023    Annual Wellness Visit (AWV)  12/01/2023    DTaP/Tdap/Td vaccine (2 - Td or Tdap) 12/02/2029    DEXA (modify frequency per FRAX score)  Completed    Pneumococcal 65+ years Vaccine  Completed    Hepatitis C screen  Addressed    Hepatitis A vaccine  Aged Out    Hib vaccine  Aged Out    Meningococcal (ACWY) vaccine  Aged Out       Subjective:      Review of Systems   Constitutional:  Negative for chills, fatigue and fever. HENT:  Negative for ear pain, postnasal drip and trouble swallowing. Eyes:  Negative for pain and visual disturbance. Respiratory:  Negative for cough and shortness of breath. Cardiovascular:  Negative for chest pain and palpitations. Gastrointestinal:  Negative for abdominal pain, blood in stool, constipation, diarrhea, nausea and vomiting. Genitourinary:  Negative for dysuria and urgency. Musculoskeletal:  Positive for back pain. Skin:  Negative for rash and wound. Neurological:  Negative for dizziness and headaches. Psychiatric/Behavioral:  Negative for dysphoric mood. The patient is not nervous/anxious. Objective:     Vitals:    11/30/22 0824   BP: 110/60   Pulse: 78   Resp: 16   SpO2: 99%   Weight: 194 lb 3.2 oz (88.1 kg)   Height: 5' 7\" (1.702 m)       Body mass index is 30.42 kg/m². Wt Readings from Last 3 Encounters:   11/30/22 194 lb 3.2 oz (88.1 kg)   11/08/22 196 lb (88.9 kg)   10/24/22 189 lb (85.7 kg)     BP Readings from Last 3 Encounters:   11/30/22 110/60   11/08/22 118/68   10/31/22 132/68       Physical Exam  Vitals and nursing note reviewed. Constitutional:       General: She is not in acute distress. Appearance: She is well-developed. She is not diaphoretic. HENT:      Head: Normocephalic and atraumatic. Right Ear: External ear normal.      Left Ear: External ear normal.      Nose: Nose normal.   Eyes:      General: No scleral icterus. Right eye: No discharge. Left eye: No discharge. Conjunctiva/sclera: Conjunctivae normal.   Cardiovascular:      Rate and Rhythm: Normal rate and regular rhythm. Heart sounds: Normal heart sounds. No murmur heard. Pulmonary:      Effort: Pulmonary effort is normal.      Breath sounds: Normal breath sounds. Musculoskeletal:      Cervical back: Normal range of motion. Thoracic back: Spasms and tenderness present. Skin:     General: Skin is warm and dry. Findings: No erythema or rash. Neurological:      Mental Status: She is alert and oriented to person, place, and time. Cranial Nerves: No cranial nerve deficit. Psychiatric:         Behavior: Behavior normal.         Thought Content: Thought content normal.         Judgment: Judgment normal.       Lab Results   Component Value Date    WBC 8.2 10/25/2022    HGB 13.0 10/25/2022    HCT 37.4 10/25/2022     10/25/2022    CHOL 141 08/01/2022    TRIG 138 08/01/2022    HDL 41 08/01/2022    HDL 41 08/01/2022    AST 21 10/24/2022     10/25/2022    K 3.7 10/25/2022     10/25/2022    CREATININE 0.82 10/25/2022    BUN 30 (H) 10/25/2022    CO2 23 10/25/2022    INR 0.98 05/02/2019    LABA1C 9.8 (H) 08/01/2022    LABMICR 10 12/12/2018    LABGLOM >60 10/25/2022    MG 1.9 12/28/2021    CALCIUM 9.0 10/25/2022       Imaging Results:    NM MYOCARDIAL SPECT REST EXERCISE OR RX    Result Date: 11/10/2022  Radiology exam is complete. No Radiologist dictation. Please follow up with ordering provider. Assessment/Plan:     Nikia Nieves was seen today for medicare awv and back pain.     Diagnoses and all orders for this visit:    Acute right-sided thoracic back pain  -     [facility]  -     predniSONE ([place] Iman) 20 MG tablet; Take 1 tablet by mouth daily for 5 days  -     XR THORACIC SPINE (3 VIEWS); Future  -     XR CERVICAL SPINE (2-3 VIEWS); Future    Type 2 diabetes mellitus with complication, without long-term current use of insulin (HCC)  -     predniSONE (DELTASONE) 20 MG tablet; Take 1 tablet by mouth daily for 5 days    Neck pain  -     XR THORACIC SPINE (3 VIEWS); Future  -     XR CERVICAL SPINE (2-3 VIEWS); Future    Patient is a 63-year-old female presents to the office for Medicare annual wellness as well as midthoracic back pain. Back pain has been present for around 4 to 6 weeks and worsening over the past week. We will try low-dose prednisone for symptoms at this time as she does have a history of diabetes mellitus type 2. We will also refer her to physical therapy for further evaluation. Patient requesting x-rays at this time and getting the longevity of her symptoms we will check an x-ray of the thoracic spine as well as a x-ray of the cervical spine given her history of previous surgery with recent manipulation causing pain. Return for Medicare Annual Wellness Visit in 1 year. Medications Prescribed:  Orders Placed This Encounter   Medications    predniSONE (DELTASONE) 20 MG tablet     Sig: Take 1 tablet by mouth daily for 5 days     Dispense:  5 tablet     Refill:  0       Future Appointments   Date Time Provider Carlos Galvin   12/28/2022  8:00 AM Myke Gooden DO [facility]   2/6/2023  8:00 AM Zuleyma Garcia MD [facility]   4/24/2023 10:45 AM Tima Street DO [facility]       Patient given educational materials - see patient instructions. Discussed use, benefit, and sideeffects of prescribed medications. All patient questions answered. Pt voiced understanding. Reviewed health maintenance. Instructed to continue current medications, diet and exercise. Patient agreed with treatment plan. Follow up as directed.      Electronically signed by Cm Baxter DO on 12/6/2022 at 1:35 PM

## 2022-11-30 NOTE — PATIENT INSTRUCTIONS
Personalized Preventive Plan for Ha Aguilar - 11/30/2022  Medicare offers a range of preventive health benefits. Some of the tests and screenings are paid in full while other may be subject to a deductible, co-insurance, and/or copay. Some of these benefits include a comprehensive review of your medical history including lifestyle, illnesses that may run in your family, and various assessments and screenings as appropriate. After reviewing your medical record and screening and assessments performed today your provider may have ordered immunizations, labs, imaging, and/or referrals for you. A list of these orders (if applicable) as well as your Preventive Care list are included within your After Visit Summary for your review. Other Preventive Recommendations:    A preventive eye exam performed by an eye specialist is recommended every 1-2 years to screen for glaucoma; cataracts, macular degeneration, and other eye disorders. A preventive dental visit is recommended every 6 months. Try to get at least 150 minutes of exercise per week or 10,000 steps per day on a pedometer . Order or download the FREE \"Exercise & Physical Activity: Your Everyday Guide\" from [employer]. Call 5-314.891.1498 or search [employer] online. You need 4457-5721 mg of calcium and 1106-2619 IU of vitamin D per day. It is possible to meet your calcium requirement with diet alone, but a vitamin D supplement is usually necessary to meet this goal.  When exposed to the sun, use a sunscreen that protects against both UVA and UVB radiation with an SPF of 30 or greater. Reapply every 2 to 3 hours or after sweating, drying off with a towel, or swimming. Always wear a seat belt when traveling in a car. Always wear a helmet when riding a bicycle or motorcycle.

## 2022-12-01 RX ORDER — ISOSORBIDE MONONITRATE 30 MG/1
TABLET, EXTENDED RELEASE ORAL
Qty: 90 TABLET | Refills: 3 | Status: SHIPPED | OUTPATIENT
Start: 2022-12-01

## 2022-12-05 RX ORDER — EZETIMIBE 10 MG/1
TABLET ORAL
Qty: 90 TABLET | Refills: 3 | Status: SHIPPED | OUTPATIENT
Start: 2022-12-05

## 2022-12-05 RX ORDER — ISOSORBIDE MONONITRATE 30 MG/1
TABLET, EXTENDED RELEASE ORAL
Qty: 90 TABLET | Refills: 3 | OUTPATIENT
Start: 2022-12-05

## 2022-12-06 ASSESSMENT — ENCOUNTER SYMPTOMS
NAUSEA: 0
ABDOMINAL PAIN: 0
BACK PAIN: 1
SHORTNESS OF BREATH: 0
TROUBLE SWALLOWING: 0
VOMITING: 0
EYE PAIN: 0
COUGH: 0
DIARRHEA: 0
CONSTIPATION: 0
BLOOD IN STOOL: 0

## 2022-12-19 ENCOUNTER — IMMUNIZATION (OUTPATIENT)
Dept: LAB | Age: 67
End: 2022-12-19
Payer: MEDICARE

## 2022-12-19 PROCEDURE — 91313 COVID-19, MODERNA BIVALENT BOOSTER, (AGE 12Y+), IM, 50 MCG/0.5 ML: CPT | Performed by: FAMILY MEDICINE

## 2022-12-19 PROCEDURE — PBSHW COVID-19, MODERNA BIVALENT BOOSTER, (AGE 12Y+), IM, 50 MCG/0.5 ML: Performed by: FAMILY MEDICINE

## 2022-12-28 ENCOUNTER — OFFICE VISIT (OUTPATIENT)
Dept: FAMILY MEDICINE CLINIC | Age: 67
End: 2022-12-28
Payer: MEDICARE

## 2022-12-28 VITALS
BODY MASS INDEX: 30.2 KG/M2 | HEART RATE: 92 BPM | SYSTOLIC BLOOD PRESSURE: 118 MMHG | RESPIRATION RATE: 16 BRPM | HEIGHT: 67 IN | DIASTOLIC BLOOD PRESSURE: 60 MMHG | WEIGHT: 192.4 LBS | OXYGEN SATURATION: 96 %

## 2022-12-28 DIAGNOSIS — M54.6 ACUTE RIGHT-SIDED THORACIC BACK PAIN: Primary | ICD-10-CM

## 2022-12-28 PROCEDURE — 3074F SYST BP LT 130 MM HG: CPT | Performed by: STUDENT IN AN ORGANIZED HEALTH CARE EDUCATION/TRAINING PROGRAM

## 2022-12-28 PROCEDURE — 99213 OFFICE O/P EST LOW 20 MIN: CPT | Performed by: STUDENT IN AN ORGANIZED HEALTH CARE EDUCATION/TRAINING PROGRAM

## 2022-12-28 PROCEDURE — 1123F ACP DISCUSS/DSCN MKR DOCD: CPT | Performed by: STUDENT IN AN ORGANIZED HEALTH CARE EDUCATION/TRAINING PROGRAM

## 2022-12-28 PROCEDURE — 3078F DIAST BP <80 MM HG: CPT | Performed by: STUDENT IN AN ORGANIZED HEALTH CARE EDUCATION/TRAINING PROGRAM

## 2022-12-28 PROCEDURE — 99212 OFFICE O/P EST SF 10 MIN: CPT | Performed by: STUDENT IN AN ORGANIZED HEALTH CARE EDUCATION/TRAINING PROGRAM

## 2022-12-28 RX ORDER — PREDNISONE 10 MG/1
TABLET ORAL
Qty: 15 TABLET | Refills: 0 | Status: SHIPPED | OUTPATIENT
Start: 2022-12-28 | End: 2023-01-07

## 2022-12-28 ASSESSMENT — ENCOUNTER SYMPTOMS
BACK PAIN: 1
DIARRHEA: 0
CONSTIPATION: 0
VOMITING: 0
NAUSEA: 0
ABDOMINAL PAIN: 0
BLOOD IN STOOL: 0
COUGH: 0
EYE PAIN: 0
SHORTNESS OF BREATH: 0
TROUBLE SWALLOWING: 0

## 2022-12-28 NOTE — PROGRESS NOTES
DARIO Calderon [address]  [address] [address]  Dept: [phone]  Dept Fax: [phone]  Loc: [phone]      Marianne Rodriguez is a 79 y.o. female who presents today for:  Chief Complaint   Patient presents with    Back Pain     Still having pain. Complete steroid which seemed to help. Pt doing PT but thinks it is not helping much. Goals    None         HPI:     HPI  Patient is a 60-year-old female who presents to the office 1 month follow-up for right-sided thoracic back pain. Has been going to physical therapy for the past 3 weeks and initially had some improvement in her right-sided back pain however states that over the past few days the pain seems to have come back and is bothering her again. States that laying down does help her back pain. States that she did have some relief from the prednisone that was given last visit. She states that while on the prednisone her pain seemed to significantly resolve. Is interested in trying another round of the prednisone. Past Medical History:   Diagnosis Date    CAD (coronary artery disease) 12/03/2021    MI, stents    Cancer [facility]) 2006    Right Breast cancer    Cervical radiculopathy     DDD (degenerative disc disease)     cervical    GERD (gastroesophageal reflux disease)     Hyperlipidemia     Hypertension     Impaired fasting blood sugar     Kidney stone     Myopia with astigmatism and presbyopia     Obesity     Osteopenia     Renal cell carcinoma ([address] 06/10/2013    right kidney: clear cell type. Corine grade 2 of 4.  2.5cm limited to kidney    Stenosis of cervical spine with myelopathy (HCC)     Stress incontinence     Type 2 diabetes mellitus without complication ([address]     Wears glasses       Past Surgical History:   Procedure Laterality Date    BLADDER SUSPENSION  2011    incontinence    BREAST BIOPSY Right 11/20/2007    wire localization     CERVICAL SPINE SURGERY  05/11/2018     ANTERIOR C4-5, C5-6 ARTHROPLASTY, (ONE PLATE AND FOUR SCREWS REMOVED C6-C7 AND DISCARDED    COLONOSCOPY  05/19/2008    COLONOSCOPY  05/03/2017    [id number]    CORONARY ANGIOPLASTY  12/03/2021    CORONARY ANGIOPLASTY  12/21/2021    CORONARY ANGIOPLASTY WITH STENT PLACEMENT  12/03/2021    CYSTOURETHROSCOPY Bilateral 09/02/2021    ENDOSCOPY, COLON, DIAGNOSTIC      HYSTERECTOMY, TOTAL ABDOMINAL (CERVIX REMOVED)  2011    with bladder lift    MASTECTOMY Bilateral 12/11/2007    lymphnodes were also removed    NECK SURGERY  06/30/2010    cervical 6-7 microdiscectomy. Dr. Onofre Mejia Right 06/10/2013    limited to kidney    [place] OFFICE/OUTPT VISIT,PROCEDURE ONLY N/A 05/11/2018    ANTERIOR C4-5, C5-6 ARTHROPLASTY WITH , SUPINE POSITION, MICROSCOPE, C-ARM, MEDTRONIC(REP NOTIFIED), EVOKES; (ONE PLATE AND FOUR SCREWS REMOVED C6-C7 AND DISCARDED) performed by Candis Beck DO at [address] Left 05/30/2019    TUBAL LIGATION Bilateral 1988    UPPER GASTROINTESTINAL ENDOSCOPY  01/12/2010    normal.  Dr. Morse Deter History   Problem Relation Age of Onset    High Blood Pressure Mother     Dementia Mother     Cancer Mother         colon cancer    High Blood Pressure Father     Diabetes Father     Heart Disease Father     High Blood Pressure Brother     Cancer Brother         lip    Cancer Maternal Uncle         pancreatic cancer    Cancer Maternal Grandmother         breast cancer    Heart Disease Paternal Grandmother     Diabetes Paternal Grandfather      Social History     Tobacco Use    Smoking status: Never    Smokeless tobacco: Never    Tobacco comments:     nicolasaaldiomedes 7/29/15   Substance Use Topics    Alcohol use:  Yes     Alcohol/week: 0.0 standard drinks     Comment: rarely      Current Outpatient Medications   Medication Sig Dispense Refill    predniSONE (DELTASONE) 10 MG tablet Take 2 tablets by mouth daily for 5 days, THEN 1 tablet daily for 5 days. 15 tablet 0    ezetimibe (ZETIA) 10 MG tablet TAKE ONE TABLET BY MOUTH EVERY MORNING 90 tablet 3    isosorbide mononitrate (IMDUR) 30 MG extended release tablet TAKE ONE TABLET BY MOUTH DAILY 90 tablet 3    ticagrelor (BRILINTA) 90 MG TABS tablet Take 90 mg by mouth 2 times daily      aluminum & magnesium hydroxide-simethicone (MAALOX) 200-200-20 MG/5ML SUSP suspension Take 30 mLs by mouth every 6 hours as needed for Indigestion  0    famotidine (PEPCID) 20 MG tablet Take 1 tablet by mouth 2 times daily as needed (heartburn) 60 tablet 0    hydroCHLOROthiazide (MICROZIDE) 12.5 MG capsule Take 1 capsule by mouth every morning 90 capsule 1    omeprazole (PRILOSEC) 20 MG delayed release capsule TAKE 1 CAPSULE DAILY 90 capsule 3    glimepiride (AMARYL) 2 MG tablet Take 1 tablet by mouth twice daily with meals 180 tablet 3    Semaglutide,0.25 or 0.5MG/DOS, (OZEMPIC, 0.25 OR 0.5 MG/DOSE,) 2 MG/1.5ML SOPN Inject 0.5 mg into the skin once a week 3 pen 5    carvedilol (COREG) 25 MG tablet Take 1 tablet by mouth 2 times daily 180 tablet 3    furosemide (LASIX) 20 MG tablet Take 1 tablet by mouth daily as needed (edema/weight gain) As need for weight gain (Edema) 90 tablet 3    atorvastatin (LIPITOR) 40 MG tablet Take 1 tablet by mouth nightly 90 tablet 3    lisinopril (PRINIVIL;ZESTRIL) 40 MG tablet Take 1 tablet by mouth daily 90 tablet 3    ticagrelor (BRILINTA) 90 MG TABS tablet Take 1 tablet by mouth 2 times daily 180 tablet 3    ibandronate (BONIVA) 150 MG tablet TAKE AS INSTRUCTED BY YOUR PRESCRIBER 12 tablet 3    allopurinol (ZYLOPRIM) 300 MG tablet Take 300 mg by mouth daily      nitroGLYCERIN (NITROSTAT) 0.4 MG SL tablet Place 1 tablet under the tongue every 5 minutes as needed for Chest pain up to max of 3 total doses.  If no relief after 1 dose, call 911. 25 tablet 3    aspirin 81 MG chewable tablet Take 1 tablet by mouth daily 30 tablet 3     No current facility-administered medications for this visit. No Known Allergies    Health Maintenance   Topic Date Due    Diabetic Alb to Cr ratio (uACR) test  Never done    Colorectal Cancer Screen  05/03/2022    A1C test (Diabetic or Prediabetic)  11/01/2022    Diabetic retinal exam  06/15/2023    Lipids  08/01/2023    Diabetic foot exam  08/12/2023    GFR test (Diabetes, CKD 3-4, OR last GFR 15-59)  10/25/2023    Depression Screen  11/30/2023    Annual Wellness Visit (AWV)  12/01/2023    DTaP/Tdap/Td vaccine (2 - Td or Tdap) 12/02/2029    DEXA (modify frequency per FRAX score)  Completed    Pneumococcal 65+ years Vaccine  Completed    COVID-19 Vaccine  Completed    Hepatitis C screen  Addressed    Hepatitis A vaccine  Aged Out    Hib vaccine  Aged Out    Meningococcal (ACWY) vaccine  Aged Out       Subjective:      Review of Systems   Constitutional:  Negative for chills, fatigue and fever. HENT:  Negative for ear pain, postnasal drip and trouble swallowing. Eyes:  Negative for pain and visual disturbance. Respiratory:  Negative for cough and shortness of breath. Cardiovascular:  Negative for chest pain and palpitations. Gastrointestinal:  Negative for abdominal pain, blood in stool, constipation, diarrhea, nausea and vomiting. Genitourinary:  Negative for dysuria and urgency. Musculoskeletal:  Positive for arthralgias and back pain. Skin:  Negative for rash and wound. Neurological:  Negative for dizziness and headaches. Psychiatric/Behavioral:  Negative for dysphoric mood. The patient is not nervous/anxious. Objective:     Vitals:    12/28/22 0744   BP: 118/60   Pulse: 92   Resp: 16   SpO2: 96%   Weight: 192 lb 6.4 oz (87.3 kg)   Height: 5' 7\" (1.702 m)       Body mass index is 30.13 kg/m².     Wt Readings from Last 3 Encounters:   12/28/22 192 lb 6.4 oz (87.3 kg)   11/30/22 194 lb 3.2 oz (88.1 kg)   11/08/22 196 lb (88.9 kg)     BP Readings from Last 3 Encounters:   12/28/22 118/60   11/30/22 110/60   11/08/22 118/68       Physical Exam  Vitals and nursing note reviewed. Constitutional:       General: She is not in acute distress. Appearance: She is well-developed. She is not diaphoretic. HENT:      Head: Normocephalic and atraumatic. Right Ear: External ear normal.      Left Ear: External ear normal.      Nose: Nose normal.   Eyes:      General: No scleral icterus. Right eye: No discharge. Left eye: No discharge. Conjunctiva/sclera: Conjunctivae normal.   Cardiovascular:      Rate and Rhythm: Normal rate and regular rhythm. Heart sounds: Normal heart sounds. No murmur heard. Pulmonary:      Effort: Pulmonary effort is normal.      Breath sounds: Normal breath sounds. Musculoskeletal:      Cervical back: Normal range of motion. Thoracic back: Spasms and tenderness present. Comments: Right-sided paraspinal spasms and tenderness. Skin:     General: Skin is warm and dry. Findings: No erythema or rash. Neurological:      Mental Status: She is alert and oriented to person, place, and time. Cranial Nerves: No cranial nerve deficit. Psychiatric:         Behavior: Behavior normal.         Thought Content:  Thought content normal.         Judgment: Judgment normal.       Lab Results   Component Value Date    WBC 8.2 10/25/2022    HGB 13.0 10/25/2022    HCT 37.4 10/25/2022     10/25/2022    CHOL 141 08/01/2022    TRIG 138 08/01/2022    HDL 41 08/01/2022    HDL 41 08/01/2022    AST 21 10/24/2022     10/25/2022    K 3.7 10/25/2022     10/25/2022    CREATININE 0.82 10/25/2022    BUN 30 (H) 10/25/2022    CO2 23 10/25/2022    INR 0.98 05/02/2019    LABA1C 9.8 (H) 08/01/2022    LABMICR 10 12/12/2018    LABGLOM >60 10/25/2022    MG 1.9 12/28/2021    CALCIUM 9.0 10/25/2022       Imaging Results:    XR CERVICAL SPINE (2-3 VIEWS)    Result Date: 11/30/2022  EXAMINATION: XRAY VIEWS OF THE CERVICAL SPINE 11/30/2022 9:00 am COMPARISON: July 11, 2018 HISTORY: ORDERING SYSTEM PROVIDED HISTORY: Acute right-sided thoracic back pain TECHNOLOGIST PROVIDED HISTORY: neck pain Reason for Exam: Back pain, neck pain FINDINGS: Cervical spine alignment is anatomic. Hardware C4-5 C5-6 stable. Mild-to-moderate degenerative change most significant C3-4 with loss disc height endplate spondylosis. No acute osseous abnormality. The odontoid is intact as visualized. Prevertebral soft tissues unremarkable. No acute findings. Postoperative and multilevel degenerative change. XR THORACIC SPINE (3 VIEWS)    Result Date: 11/30/2022  EXAMINATION: THREE XRAY VIEWS OF THE THORACIC SPINE 11/30/2022 9:00 am COMPARISON: November 7, 2011 HISTORY: ORDERING SYSTEM PROVIDED HISTORY: Acute right-sided thoracic back pain TECHNOLOGIST PROVIDED HISTORY: thoracic pain Reason for Exam: Back pain, neck pain FINDINGS: Thoracic spine alignment is anatomic. No acute osseous abnormality. Moderate degenerative change most significant midthoracic spine with loss disc height endplate spondylosis. Vertebral body axial heights maintained. No acute findings. Multilevel degenerative change. Assessment/Plan:     Marcin Kirkpatrick was seen today for back pain. Diagnoses and all orders for this visit:    Acute right-sided thoracic back pain  -     predniSONE (DELTASONE) 10 MG tablet; Take 2 tablets by mouth daily for 5 days, THEN 1 tablet daily for 5 days. Subacute right-sided thoracic back pain initially improved with steroid and physical therapy however seems to be returning again. We will do try a tapering dose of steroid 20 mg for 5 days followed by 10 mg for 5 days. Recommend continuing physical therapy for an additional 2 to 3 weeks for a total 6 weeks. Return to the clinic for any new or worsening symptoms. No follow-ups on file.       Medications Prescribed:  Orders Placed This Encounter   Medications    predniSONE (DELTASONE) 10 MG tablet     Sig: Take 2 tablets by mouth daily for 5 days, THEN 1 tablet daily for 5 days. Dispense:  15 tablet     Refill:  0         Future Appointments   Date Time Provider Carlos Simpsoni   1/10/2023  8:30 AM Karlee Garcia  [address] [place] [address]   2/6/2023  8:00 AM Harris Vázquez MD [facility]   4/24/2023 10:45 AM Tima Gilliam DO [facility]       Patient given educational materials - see patient instructions. Discussed use, benefit, and sideeffects of prescribed medications. All patient questions answered. Pt voiced understanding. Reviewed health maintenance. Instructed to continue current medications, diet and exercise. Patient agreed with treatment plan. Follow up as directed.      Electronically signed by Zena Vásquez DO on 12/28/2022 at 8:11 AM

## 2023-01-06 PROBLEM — M54.6 PAIN IN THORACIC SPINE: Status: ACTIVE | Noted: 2022-12-19

## 2023-01-06 NOTE — TELEPHONE ENCOUNTER
Patient is scheduled for office visit with Dr. Diego Pickering next Tuesday 1/10/23 to potentially reschedule colonoscopy. Please address ASA & Brilinta hold. Thank you in advance!

## 2023-01-06 NOTE — TELEPHONE ENCOUNTER
[facility]        Brendon Watson          ZRS:[birth year]           Surgical/Procedure Planned: EGD & Colonoscopy     Date & [facility]          Patient will chose date for procedure @ OV on 1/10/23. Outpatient Planned Length of OR: 45 min.     Sedation: intravenous sedation    Estimated Cardiac Risk for Non-Cardiac Surgery/Procedure     Low______ Moderate______ High______    Medication Instructions - Clarification needed by this date:       ASA 81 mg       Hold ___ Days    BRILINTA  Hold ___ Days      Resume medications:     Lovenox indicated: _____Yes _____NO    Provider: Dr. Roseann Morataya       Signature of Provider Giving Orders for Medication holds:    _____________________________________________

## 2023-02-20 ENCOUNTER — TELEPHONE (OUTPATIENT)
Dept: FAMILY MEDICINE CLINIC | Age: 68
End: 2023-02-20

## 2023-02-20 NOTE — TELEPHONE ENCOUNTER
----- Message from Kervin Baldwin sent at 2/17/2023 10:32 AM EST -----  Subject: Message to Provider    QUESTIONS  Information for Provider? Chris Clarke need a call back from [employer]   insurance for patient last two a1c results. two dates and values Chris Clarke   can be reached @ [phone] fax # [phone]  ---------------------------------------------------------------------------  --------------  [id number] [employer]  [phone]; OK to leave message on voicemail  ---------------------------------------------------------------------------  --------------  SCRIPT ANSWERS  Relationship to Patient? Third Party  Third Party Type? Insurance?   Representative Name? Zachary Snider

## 2023-03-08 ENCOUNTER — HOSPITAL ENCOUNTER (OUTPATIENT)
Age: 68
Discharge: HOME | End: 2023-03-08
Payer: MEDICARE

## 2023-03-08 DIAGNOSIS — E11.9 TYPE 2 DIABETES MELLITUS WITHOUT COMPLICATION, WITHOUT LONG-TERM CURRENT USE OF INSULIN (HCC): ICD-10-CM

## 2023-03-08 LAB
ANION GAP SERPL CALCULATED.3IONS-SCNC: 12 MMOL/L (ref 9–17)
BUN SERPL-MCNC: 28 MG/DL (ref 8–23)
BUN/CREAT BLD: 28 (ref 9–20)
CALCIUM SERPL-MCNC: 9.4 MG/DL (ref 8.6–10.4)
CHLORIDE SERPL-SCNC: 98 MMOL/L (ref 98–107)
CO2 SERPL-SCNC: 28 MMOL/L (ref 20–31)
CREAT SERPL-MCNC: 1.01 MG/DL (ref 0.5–0.9)
EST. AVERAGE GLUCOSE BLD GHB EST-MCNC: 151 MG/DL
GFR SERPL CREATININE-BSD FRML MDRD: >60 ML/MIN/1.73M2
GLUCOSE SERPL-MCNC: 142 MG/DL (ref 70–99)
HBA1C MFR BLD: 6.9 % (ref 4–6)
POTASSIUM SERPL-SCNC: 3.7 MMOL/L (ref 3.7–5.3)
SODIUM SERPL-SCNC: 138 MMOL/L (ref 135–144)

## 2023-03-08 PROCEDURE — 36415 COLL VENOUS BLD VENIPUNCTURE: CPT

## 2023-03-08 PROCEDURE — 80048 BASIC METABOLIC PNL TOTAL CA: CPT

## 2023-03-08 PROCEDURE — 83036 HEMOGLOBIN GLYCOSYLATED A1C: CPT

## 2023-03-14 ENCOUNTER — TELEPHONE (OUTPATIENT)
Dept: SURGERY | Age: 68
End: 2023-03-14

## 2023-03-14 ENCOUNTER — OFFICE VISIT (OUTPATIENT)
Dept: FAMILY MEDICINE CLINIC | Age: 68
End: 2023-03-14
Payer: MEDICARE

## 2023-03-14 VITALS
SYSTOLIC BLOOD PRESSURE: 128 MMHG | HEIGHT: 67 IN | HEART RATE: 68 BPM | WEIGHT: 190 LBS | BODY MASS INDEX: 29.82 KG/M2 | DIASTOLIC BLOOD PRESSURE: 70 MMHG

## 2023-03-14 DIAGNOSIS — I15.8 OTHER SECONDARY HYPERTENSION: ICD-10-CM

## 2023-03-14 DIAGNOSIS — K21.9 GASTROESOPHAGEAL REFLUX DISEASE WITHOUT ESOPHAGITIS: ICD-10-CM

## 2023-03-14 DIAGNOSIS — E78.2 MIXED HYPERLIPIDEMIA: ICD-10-CM

## 2023-03-14 DIAGNOSIS — I21.11 ACUTE ST ELEVATION MYOCARDIAL INFARCTION (STEMI) INVOLVING RIGHT CORONARY ARTERY (HCC): ICD-10-CM

## 2023-03-14 DIAGNOSIS — I21.3 ST ELEVATION MYOCARDIAL INFARCTION (STEMI), UNSPECIFIED ARTERY (HCC): ICD-10-CM

## 2023-03-14 DIAGNOSIS — N20.0 KIDNEY STONE: ICD-10-CM

## 2023-03-14 DIAGNOSIS — C64.1 RENAL CELL CARCINOMA OF RIGHT KIDNEY (HCC): ICD-10-CM

## 2023-03-14 DIAGNOSIS — Z12.11 ENCOUNTER FOR SCREENING COLONOSCOPY: ICD-10-CM

## 2023-03-14 DIAGNOSIS — I10 PRIMARY HYPERTENSION: ICD-10-CM

## 2023-03-14 DIAGNOSIS — E11.8 TYPE 2 DIABETES MELLITUS WITH COMPLICATION, WITHOUT LONG-TERM CURRENT USE OF INSULIN (HCC): Primary | ICD-10-CM

## 2023-03-14 PROCEDURE — 1123F ACP DISCUSS/DSCN MKR DOCD: CPT | Performed by: FAMILY MEDICINE

## 2023-03-14 PROCEDURE — G8482 FLU IMMUNIZE ORDER/ADMIN: HCPCS | Performed by: FAMILY MEDICINE

## 2023-03-14 PROCEDURE — 99214 OFFICE O/P EST MOD 30 MIN: CPT | Performed by: FAMILY MEDICINE

## 2023-03-14 PROCEDURE — 3074F SYST BP LT 130 MM HG: CPT | Performed by: FAMILY MEDICINE

## 2023-03-14 PROCEDURE — 3078F DIAST BP <80 MM HG: CPT | Performed by: FAMILY MEDICINE

## 2023-03-14 PROCEDURE — 3044F HG A1C LEVEL LT 7.0%: CPT | Performed by: FAMILY MEDICINE

## 2023-03-14 PROCEDURE — 99213 OFFICE O/P EST LOW 20 MIN: CPT | Performed by: FAMILY MEDICINE

## 2023-03-14 PROCEDURE — G8417 CALC BMI ABV UP PARAM F/U: HCPCS | Performed by: FAMILY MEDICINE

## 2023-03-14 PROCEDURE — 2022F DILAT RTA XM EVC RTNOPTHY: CPT | Performed by: FAMILY MEDICINE

## 2023-03-14 PROCEDURE — G9708 BILAT MAST/HX BI /UNILAT MAS: HCPCS | Performed by: FAMILY MEDICINE

## 2023-03-14 PROCEDURE — 1090F PRES/ABSN URINE INCON ASSESS: CPT | Performed by: FAMILY MEDICINE

## 2023-03-14 PROCEDURE — 1036F TOBACCO NON-USER: CPT | Performed by: FAMILY MEDICINE

## 2023-03-14 PROCEDURE — 3017F COLORECTAL CA SCREEN DOC REV: CPT | Performed by: FAMILY MEDICINE

## 2023-03-14 PROCEDURE — G8427 DOCREV CUR MEDS BY ELIG CLIN: HCPCS | Performed by: FAMILY MEDICINE

## 2023-03-14 PROCEDURE — G8399 PT W/DXA RESULTS DOCUMENT: HCPCS | Performed by: FAMILY MEDICINE

## 2023-03-14 RX ORDER — IBANDRONATE SODIUM 150 MG/1
TABLET, FILM COATED ORAL
Qty: 12 TABLET | Refills: 3 | Status: SHIPPED | OUTPATIENT
Start: 2023-03-14

## 2023-03-14 RX ORDER — GLIMEPIRIDE 2 MG/1
TABLET ORAL
Qty: 180 TABLET | Refills: 3 | Status: SHIPPED | OUTPATIENT
Start: 2023-03-14

## 2023-03-14 RX ORDER — ATORVASTATIN CALCIUM 40 MG/1
40 TABLET, FILM COATED ORAL NIGHTLY
Qty: 90 TABLET | Refills: 3 | Status: SHIPPED | OUTPATIENT
Start: 2023-03-14

## 2023-03-14 RX ORDER — ALLOPURINOL 300 MG/1
300 TABLET ORAL DAILY
Qty: 90 TABLET | Refills: 3 | Status: SHIPPED | OUTPATIENT
Start: 2023-03-14

## 2023-03-14 RX ORDER — ISOSORBIDE MONONITRATE 30 MG/1
TABLET, EXTENDED RELEASE ORAL
Qty: 90 TABLET | Refills: 3 | Status: SHIPPED | OUTPATIENT
Start: 2023-03-14

## 2023-03-14 RX ORDER — HYDROCHLOROTHIAZIDE 12.5 MG/1
12.5 CAPSULE, GELATIN COATED ORAL EVERY MORNING
Qty: 90 CAPSULE | Refills: 1 | Status: SHIPPED | OUTPATIENT
Start: 2023-03-14

## 2023-03-14 RX ORDER — CARVEDILOL 25 MG/1
25 TABLET ORAL 2 TIMES DAILY
Qty: 180 TABLET | Refills: 3 | Status: SHIPPED | OUTPATIENT
Start: 2023-03-14

## 2023-03-14 RX ORDER — OMEPRAZOLE 20 MG/1
CAPSULE, DELAYED RELEASE ORAL
Qty: 90 CAPSULE | Refills: 3 | Status: SHIPPED | OUTPATIENT
Start: 2023-03-14

## 2023-03-14 RX ORDER — LISINOPRIL 40 MG/1
40 TABLET ORAL DAILY
Qty: 90 TABLET | Refills: 3 | Status: SHIPPED | OUTPATIENT
Start: 2023-03-14

## 2023-03-14 RX ORDER — SEMAGLUTIDE 1.34 MG/ML
0.5 INJECTION, SOLUTION SUBCUTANEOUS WEEKLY
Qty: 3 ADJUSTABLE DOSE PRE-FILLED PEN SYRINGE | Refills: 3 | Status: SHIPPED | OUTPATIENT
Start: 2023-03-14

## 2023-03-14 RX ORDER — EZETIMIBE 10 MG/1
TABLET ORAL
Qty: 90 TABLET | Refills: 3 | Status: SHIPPED | OUTPATIENT
Start: 2023-03-14

## 2023-03-14 SDOH — ECONOMIC STABILITY: HOUSING INSECURITY
IN THE LAST 12 MONTHS, WAS THERE A TIME WHEN YOU DID NOT HAVE A STEADY PLACE TO SLEEP OR SLEPT IN A SHELTER (INCLUDING NOW)?: NO

## 2023-03-14 SDOH — ECONOMIC STABILITY: INCOME INSECURITY: HOW HARD IS IT FOR YOU TO PAY FOR THE VERY BASICS LIKE FOOD, HOUSING, MEDICAL CARE, AND HEATING?: NOT HARD AT ALL

## 2023-03-14 SDOH — ECONOMIC STABILITY: FOOD INSECURITY: WITHIN THE PAST 12 MONTHS, THE FOOD YOU BOUGHT JUST DIDN'T LAST AND YOU DIDN'T HAVE MONEY TO GET MORE.: NEVER TRUE

## 2023-03-14 SDOH — ECONOMIC STABILITY: FOOD INSECURITY: WITHIN THE PAST 12 MONTHS, YOU WORRIED THAT YOUR FOOD WOULD RUN OUT BEFORE YOU GOT MONEY TO BUY MORE.: NEVER TRUE

## 2023-03-14 ASSESSMENT — PATIENT HEALTH QUESTIONNAIRE - PHQ9
SUM OF ALL RESPONSES TO PHQ QUESTIONS 1-9: 0
SUM OF ALL RESPONSES TO PHQ QUESTIONS 1-9: 0
SUM OF ALL RESPONSES TO PHQ9 QUESTIONS 1 & 2: 0
2. FEELING DOWN, DEPRESSED OR HOPELESS: 0
1. LITTLE INTEREST OR PLEASURE IN DOING THINGS: 0
SUM OF ALL RESPONSES TO PHQ QUESTIONS 1-9: 0
SUM OF ALL RESPONSES TO PHQ QUESTIONS 1-9: 0

## 2023-03-14 ASSESSMENT — ENCOUNTER SYMPTOMS
NAUSEA: 0
EYES NEGATIVE: 1
BACK PAIN: 1
CONSTIPATION: 0
RESPIRATORY NEGATIVE: 1
DIARRHEA: 1
ALLERGIC/IMMUNOLOGIC NEGATIVE: 1

## 2023-03-14 NOTE — PROGRESS NOTES
3/14/2023     Leticia Haley (:  1955) is a 79 y.o. female, here for evaluation of the following medical concerns:    Diabetes  Pertinent negatives for diabetes include no chest pain and no fatigue. Hypertension  Associated symptoms include neck pain (improved after surgery). Pertinent negatives include no chest pain. Scheduled follow up on diabetes, htn, hyperlipidemia. I have reviewed the patient's medical history in detail and updated the computerized patient record. She reports not checking bs much. Bs readings seem higher since her MI. Jorden Vargas Poor control with a1c at 11% on prior check. She had a month or so of noncompliance at that time. We restarted the amaryl and metformin and down to 6.9% by may 2020 follow up . She has had diarrhea , gas, and rare incontinence on the metformin, so she has not been as compliant with meds. She wasn't sure which drug was causing the issue so she was holding the amaryl as well. a1c up to 9% in December. She slacked on diet down in [place], she has been compliant with the medications \"95%\"   Currently she is off the metformin due to side effects. Still taking amaryl . No recurrent chest pain. No gerd concerns. Noticing dizziness, diaphoresis, nausea around noon to early afternoon   . Past Medical History:   Diagnosis Date    CAD (coronary artery disease) 2021    MI, stents    Cancer [facility])     Right Breast cancer    Cervical radiculopathy     DDD (degenerative disc disease)     cervical    GERD (gastroesophageal reflux disease)     Hyperlipidemia     Hypertension     Impaired fasting blood sugar     Kidney stone     Myopia with astigmatism and presbyopia     Obesity     Osteopenia     Renal cell carcinoma ([facility] 06/10/2013    right kidney: clear cell type. Corine grade 2 of 4.  2.5cm limited to kidney    Stenosis of cervical spine with myelopathy (HCC)     Stress incontinence     Type 2 diabetes mellitus without complication [facility])     Wears glasses      Past Surgical History:   Procedure Laterality Date    BLADDER SUSPENSION  2011    incontinence    BREAST BIOPSY Right 11/20/2007    wire localization     CERVICAL SPINE SURGERY  05/11/2018     ANTERIOR C4-5, C5-6 ARTHROPLASTY, (ONE PLATE AND FOUR SCREWS REMOVED C6-C7 AND DISCARDED    COLONOSCOPY  05/19/2008    COLONOSCOPY  05/03/2017    [id number]    CORONARY ANGIOPLASTY  12/03/2021    CORONARY ANGIOPLASTY  12/21/2021    CORONARY ANGIOPLASTY WITH STENT PLACEMENT  12/03/2021    CYSTOURETHROSCOPY Bilateral 09/02/2021    ENDOSCOPY, COLON, DIAGNOSTIC      HYSTERECTOMY, TOTAL ABDOMINAL (CERVIX REMOVED)  2011    with bladder lift    MASTECTOMY Bilateral 12/11/2007    lymphnodes were also removed    NECK SURGERY  06/30/2010    cervical 6-7 microdiscectomy. Dr. Emmanuel Morrison Right 06/10/2013    limited to kidney    [place] OFFICE/OUTPT VISIT,PROCEDURE ONLY N/A 05/11/2018    ANTERIOR C4-5, C5-6 ARTHROPLASTY WITH , SUPINE POSITION, MICROSCOPE, C-ARM, MEDTRONIC(REP NOTIFIED), EVOKES; (ONE PLATE AND FOUR SCREWS REMOVED C6-C7 AND DISCARDED) performed by Vaughn Laureano DO at [address] Left 05/30/2019    TUBAL LIGATION Bilateral 1988    UPPER GASTROINTESTINAL ENDOSCOPY  01/12/2010    normal.  Dr. Garza Rylee     \      Review of Systems   Constitutional: Negative. Negative for fatigue and unexpected weight change. HENT: Negative. Eyes: Negative. Respiratory: Negative. Cardiovascular: Negative. Negative for chest pain. Gastrointestinal:  Positive for diarrhea (intermittent, metformin related). Negative for constipation and nausea. Endocrine: Negative. Genitourinary: Negative. Negative for flank pain. Musculoskeletal:  Positive for arthralgias (right shoulder), back pain (left lower back) and neck pain (improved after surgery). Negative for neck stiffness. Skin: Negative. Allergic/Immunologic: Negative. Neurological: Negative. Hematological: Negative. Psychiatric/Behavioral: Negative. Prior to Visit Medications    Medication Sig Taking? Authorizing Provider   ezetimibe (ZETIA) 10 MG tablet TAKE ONE TABLET BY MOUTH EVERY MORNING Yes Tima Kenny DO   isosorbide mononitrate (IMDUR) 30 MG extended release tablet TAKE ONE TABLET BY MOUTH DAILY Yes Ann Elizabeth MD   hydroCHLOROthiazide (MICROZIDE) 12.5 MG capsule Take 1 capsule by mouth every morning Yes Tima Kenny DO   omeprazole (PRILOSEC) 20 MG delayed release capsule TAKE 1 CAPSULE DAILY Yes Mirella Smart MD   glimepiride (AMARYL) 2 MG tablet Take 1 tablet by mouth twice daily with meals Yes Mirella Smart MD   Semaglutide,0.25 or 0.5MG/DOS, (OZEMPIC, 0.25 OR 0.5 MG/DOSE,) 2 MG/1.5ML SOPN Inject 0.5 mg into the skin once a week Yes Mirella Smart MD   carvedilol (COREG) 25 MG tablet Take 1 tablet by mouth 2 times daily Yes Tima Kenny DO   atorvastatin (LIPITOR) 40 MG tablet Take 1 tablet by mouth nightly Yes Tima Kenny DO   lisinopril (PRINIVIL;ZESTRIL) 40 MG tablet Take 1 tablet by mouth daily Yes Tima Kenny DO   ticagrelor (BRILINTA) 90 MG TABS tablet Take 1 tablet by mouth 2 times daily Yes Tima Kenny DO   ibandronate (BONIVA) 150 MG tablet TAKE AS INSTRUCTED BY YOUR PRESCRIBER Yes Mirella Smart MD   allopurinol (ZYLOPRIM) 300 MG tablet Take 300 mg by mouth daily Yes Historical Provider, MD   nitroGLYCERIN (NITROSTAT) 0.4 MG SL tablet Place 1 tablet under the tongue every 5 minutes as needed for Chest pain up to max of 3 total doses. If no relief after 1 dose, call 911.  Yes Willie Murillo MD   aspirin 81 MG chewable tablet Take 1 tablet by mouth daily Yes Tashia Moya MD   furosemide (LASIX) 20 MG tablet Take 1 tablet by mouth daily as needed (edema/weight gain) As need for weight gain (Edema)  Tima Kenny DO        Social History     Tobacco Use    Smoking status: Never    Smokeless tobacco: Never    Tobacco comments:     cwaltmire 7/29/15 Substance Use Topics    Alcohol use: Yes     Alcohol/week: 0.0 standard drinks     Comment: rarely        Vitals:    03/14/23 0801   BP: 128/70   Site: Left Upper Arm   Position: Sitting   Cuff Size: Large Adult   Pulse: 68   Weight: 190 lb (86.2 kg)   Height: 5' 7\" (1.702 m)     Estimated body mass index is 29.76 kg/m² as calculated from the following:    Height as of this encounter: 5' 7\" (1.702 m). Weight as of this encounter: 190 lb (86.2 kg). Physical Exam  Constitutional:       General: She is not in acute distress. Appearance: She is normal weight. She is not toxic-appearing. HENT:      Head: Normocephalic and atraumatic. Nose: Nose normal.   Eyes:      Extraocular Movements: Extraocular movements intact. Pulmonary:      Effort: Pulmonary effort is normal. No respiratory distress. Musculoskeletal:      Cervical back: Normal range of motion. Skin:     Findings: No rash. Neurological:      Mental Status: She is alert. Psychiatric:         Mood and Affect: Mood normal.         Behavior: Behavior normal.         Thought Content:  Thought content normal.         Judgment: Judgment normal.   /70 (Site: Left Upper Arm, Position: Sitting, Cuff Size: Large Adult)   Pulse 68   Ht 5' 7\" (1.702 m)   Wt 190 lb (86.2 kg)   LMP  (LMP Unknown)   BMI 29.76 kg/m²   Down 12 lbs over interval     Hospital Outpatient Visit on 03/08/2023   Component Date Value Ref Range Status    Glucose 03/08/2023 142 (A)  70 - 99 mg/dL Final    BUN 03/08/2023 28 (A)  8 - 23 mg/dL Final    Creatinine 03/08/2023 1.01 (A)  0.50 - 0.90 mg/dL Final    Est, Glom Filt Rate 03/08/2023 >60  >60 mL/min/1.73m2 Final    Bun/Cre Ratio 03/08/2023 28 (A)  9 - 20 Final    Calcium 03/08/2023 9.4  8.6 - 10.4 mg/dL Final    Sodium 03/08/2023 138  135 - 144 mmol/L Final    Potassium 03/08/2023 3.7  3.7 - 5.3 mmol/L Final    Chloride 03/08/2023 98  98 - 107 mmol/L Final    CO2 03/08/2023 28  20 - 31 mmol/L Final    Anion Gap 03/08/2023 12  9 - 17 mmol/L Final    Hemoglobin A1C 03/08/2023 6.9 (A)  4.0 - 6.0 % Final    Estimated Avg Glucose 03/08/2023 151  mg/dL Final       ASSESSMENT/PLAN:  Encounter Diagnoses   Name Primary? Type 2 diabetes mellitus with complication, without long-term current use of insulin (HCC) Yes    Primary hypertension     Mixed hyperlipidemia     Gastroesophageal reflux disease without esophagitis     Acute ST elevation myocardial infarction (STEMI) involving right coronary artery (HCC)     Renal cell carcinoma of right kidney (HCC)          Diabetes: better compliance with medications this interval.  Improved a1c down to 7.8%. back up to 9.8%. off metformin. Compliant with amaryl. Not checking bs at home. Adding ozempic 0.5mg sq every week. Doing better at present. 6.9%.  plan to cont. Current dosing. Htn: good control at present. Cont current coreg and lisinopril, hctz prn, imdur. Having spells in the afternoon that likely seem hypotensive in nature. Only using lasix prn at present. Rec. Changing lisinopril to an evening dose to avoid taking too many together in the am.  Check bp and sugar when spells of dizziness and nausea develop. Hyperlipidemia; recent labs reviewed good control on simvastatin,. Zetia added more recently. Egan Cliche; fairly quiescent at present. Cont. Omeprazole. Cad; quiescent . Taking imdur daily. Cont. Coreg and statin and asa. No ntg use. Cont. Routine cardiology follow up as directed. Renal cell carcinoma. Following with Dr. Patty Smith. No evidence of recurrence. On allopurinol for uric acid stones. None in awhile. Cont. Same. An electronic signature was used to authenticate this note.     --Ayana Collins MD on 3/14/2023 at 8:47 AM

## 2023-03-16 NOTE — TELEPHONE ENCOUNTER
Patient discharged from [facility] 12/6/21- STEMI, Pneumonia due to Pseudomonas

## 2023-04-24 ENCOUNTER — TELEPHONE (OUTPATIENT)
Dept: CARDIOLOGY | Age: 68
End: 2023-04-24

## 2023-04-24 ENCOUNTER — OFFICE VISIT (OUTPATIENT)
Dept: CARDIOLOGY | Age: 68
End: 2023-04-24
Payer: MEDICARE

## 2023-04-24 VITALS
HEIGHT: 67 IN | SYSTOLIC BLOOD PRESSURE: 116 MMHG | HEART RATE: 85 BPM | BODY MASS INDEX: 29.66 KG/M2 | WEIGHT: 189 LBS | DIASTOLIC BLOOD PRESSURE: 70 MMHG

## 2023-04-24 DIAGNOSIS — I25.118 CORONARY ARTERY DISEASE OF NATIVE HEART WITH STABLE ANGINA PECTORIS, UNSPECIFIED VESSEL OR LESION TYPE (HCC): Primary | ICD-10-CM

## 2023-04-24 PROCEDURE — 1123F ACP DISCUSS/DSCN MKR DOCD: CPT | Performed by: INTERNAL MEDICINE

## 2023-04-24 PROCEDURE — 99212 OFFICE O/P EST SF 10 MIN: CPT | Performed by: INTERNAL MEDICINE

## 2023-04-24 PROCEDURE — G8417 CALC BMI ABV UP PARAM F/U: HCPCS | Performed by: INTERNAL MEDICINE

## 2023-04-24 PROCEDURE — G8427 DOCREV CUR MEDS BY ELIG CLIN: HCPCS | Performed by: INTERNAL MEDICINE

## 2023-04-24 PROCEDURE — 3017F COLORECTAL CA SCREEN DOC REV: CPT | Performed by: INTERNAL MEDICINE

## 2023-04-24 PROCEDURE — 99214 OFFICE O/P EST MOD 30 MIN: CPT | Performed by: INTERNAL MEDICINE

## 2023-04-24 PROCEDURE — 93005 ELECTROCARDIOGRAM TRACING: CPT | Performed by: INTERNAL MEDICINE

## 2023-04-24 PROCEDURE — 93010 ELECTROCARDIOGRAM REPORT: CPT | Performed by: INTERNAL MEDICINE

## 2023-04-24 PROCEDURE — 1036F TOBACCO NON-USER: CPT | Performed by: INTERNAL MEDICINE

## 2023-04-24 PROCEDURE — G9708 BILAT MAST/HX BI /UNILAT MAS: HCPCS | Performed by: INTERNAL MEDICINE

## 2023-04-24 PROCEDURE — 3074F SYST BP LT 130 MM HG: CPT | Performed by: INTERNAL MEDICINE

## 2023-04-24 PROCEDURE — G8399 PT W/DXA RESULTS DOCUMENT: HCPCS | Performed by: INTERNAL MEDICINE

## 2023-04-24 PROCEDURE — 1090F PRES/ABSN URINE INCON ASSESS: CPT | Performed by: INTERNAL MEDICINE

## 2023-04-24 PROCEDURE — 3078F DIAST BP <80 MM HG: CPT | Performed by: INTERNAL MEDICINE

## 2023-04-24 NOTE — TELEPHONE ENCOUNTER
Left message for patient to call. Will notify her she is cleared for colonoscopy.  Need to route to Dr Evelyn Bey

## 2023-04-24 NOTE — TELEPHONE ENCOUNTER
Cardiology Consultation  [facility] [address], [address] [place]  [phone]      04/24/23       Regarding:  Terrell Tino  1955      To Whom It May Concern,      Patient is  Intermediate Cardiac Risk for planned surgery. Special instructions:  Patient is taking Brilinta and ASA and can be held for 5 days prior to procedure and resumed as soon as surgical bleeding risk has resolved. Please continue other meds.         Thank you,      Annika Berger DO, FACC, RPVI, BARBIE, [facility]  Cardiology  [facility]    Electronically signed by Annika Berger DO

## 2023-04-24 NOTE — PROGRESS NOTES
patient. Assessment'/Plan:  - CAD s/p PCI on 12/21- with low risk stress test on 11/22. No CP. On ASA and statin, will change to brlinta 60 bid. - H/o inferior STEMI 12/21- s/p PCI to [facility], then staged PCI to D1/Om1/Om2. Continue meds as above. - Trace LE edema- resolved off norvasc  - DL- LDL 72 on 8/22, update labs now. continue Statin & Zetia  - H/o Renal Ca - stable  - H/o DM- stable. Managed by PCP. The patient was consulted on signs and symptoms of CVD/CHF/ACS and notified when to call office. The patient is to continue heart healthy diet, weight loss and exercise as tolerated. Patient's medications and side effects were discussed. Medication refills were provided if needed. Follow up appointment timing was discussed. All questions and concerns were addressed to patient's satisfaction. The patient is to follow up in 6 months or sooner if necessary. Thank you for allowing me to participate in the care of this patient, please do not hesitate to call if you have any questions. Randolph Sanchez DO, [facility], [address], [address] [facility]  [facility]. [facility]  [phone]    This note was created with the assistance of a speech-recognition program.  Although the intention is to generate a document that actually reflects the content of the visit, no guarantees can be provided that every mistake has been identified and corrected by editing.

## 2023-04-24 NOTE — TELEPHONE ENCOUNTER
Patient was seen this morning. She forgot to inform Dr Kristina Levine she was being set up for a colonoscopy with Dr Nicole Mccann. Date pending. She needs clearance and instructions if she can hold her anitcoags.

## 2023-04-25 ENCOUNTER — HOSPITAL ENCOUNTER (OUTPATIENT)
Age: 68
Discharge: HOME | End: 2023-04-25
Payer: MEDICARE

## 2023-04-25 DIAGNOSIS — I25.118 CORONARY ARTERY DISEASE OF NATIVE HEART WITH STABLE ANGINA PECTORIS, UNSPECIFIED VESSEL OR LESION TYPE (HCC): ICD-10-CM

## 2023-04-25 LAB
ANION GAP SERPL CALCULATED.3IONS-SCNC: 9 MMOL/L (ref 9–17)
BUN SERPL-MCNC: 22 MG/DL (ref 8–23)
BUN/CREAT BLD: 25 (ref 9–20)
CALCIUM SERPL-MCNC: 9.4 MG/DL (ref 8.6–10.4)
CHLORIDE SERPL-SCNC: 103 MMOL/L (ref 98–107)
CHOLEST SERPL-MCNC: 122 MG/DL
CHOLESTEROL/HDL RATIO: 3.2
CO2 SERPL-SCNC: 27 MMOL/L (ref 20–31)
CREAT SERPL-MCNC: 0.88 MG/DL (ref 0.5–0.9)
GFR SERPL CREATININE-BSD FRML MDRD: >60 ML/MIN/1.73M2
GLUCOSE SERPL-MCNC: 145 MG/DL (ref 70–99)
HDLC SERPL-MCNC: 38 MG/DL
LDLC SERPL CALC-MCNC: 67 MG/DL (ref 0–130)
POTASSIUM SERPL-SCNC: 4.2 MMOL/L (ref 3.7–5.3)
SODIUM SERPL-SCNC: 139 MMOL/L (ref 135–144)
TRIGL SERPL-MCNC: 83 MG/DL

## 2023-04-25 PROCEDURE — 36415 COLL VENOUS BLD VENIPUNCTURE: CPT

## 2023-04-25 PROCEDURE — 80048 BASIC METABOLIC PNL TOTAL CA: CPT

## 2023-04-25 PROCEDURE — 80061 LIPID PANEL: CPT

## 2023-05-11 ENCOUNTER — TELEPHONE (OUTPATIENT)
Dept: CARDIOLOGY | Age: 68
End: 2023-05-11

## 2023-05-11 NOTE — TELEPHONE ENCOUNTER
Patient notified per Dr Lyla Villalba to cut Coreg tabs in half and to hold BP meds if SBP is 100 or less. Pt verbalized understanding and stated her BP went up to 109/62 since drinking 32 oz of gatorade. Pt stated she will try the med changes and will let us know if she still has problems. Pt had no further questions at the time.

## 2023-05-11 NOTE — TELEPHONE ENCOUNTER
[place]. Pt reports some SOB, lightheadedness this am as well as last pm. Pt states she will be working in the yard and just start feeling bad and needs to sit down. Pt states she does not have any pain. Readings this morning were. .. 1033 --88/57-84  1040 --78/51-85  1045 --86/55-86    Writer advised pt to try some gatorade and a salty snack while she is waiting to hear back and to go to the er is any more sx occur in addition to what she already has - take it easy for a while.

## 2023-06-19 ENCOUNTER — TELEPHONE (OUTPATIENT)
Dept: CARDIOLOGY | Age: 68
End: 2023-06-19

## 2023-06-19 NOTE — TELEPHONE ENCOUNTER
Received a call from the dentist office. They need to know if there are any pre meds that the patient needs to take prior to her dental cleaning.       [facility] [phone]

## 2023-06-19 NOTE — TELEPHONE ENCOUNTER
Cardiology Consultation  [address]  [phone]      06/19/23       Regarding:  Mariluz Boston  1955      To Whom It May Concern,      Patient is  Intermediate Cardiac Risk for planned surgery. Special instructions:  Patient is taking aspirin and brilinta and can be held for 5 days prior to procedure and resumed as soon as surgical bleeding risk has resolved. Please continue other meds.         Thank you,      Brock Cristina DO, FACC, RPVI, BARBIE, [facility]  Cardiology  [facility]    Electronically signed by Brock Cristina DO

## 2023-07-11 RX ORDER — HYDROCHLOROTHIAZIDE 12.5 MG/1
CAPSULE, GELATIN COATED ORAL
Qty: 90 CAPSULE | Refills: 3 | OUTPATIENT
Start: 2023-07-11

## 2023-07-20 ENCOUNTER — TELEPHONE (OUTPATIENT)
Dept: CARDIOLOGY | Age: 68
End: 2023-07-20

## 2023-07-20 NOTE — TELEPHONE ENCOUNTER
Dr. Dao Bundy is requesting Clearance and to hold Brilinta 5 days prior to Right Total shoulder arthroplasty planned for 9/25/23 at [facility]. Fax response with records to [phone].

## 2023-07-21 NOTE — TELEPHONE ENCOUNTER
Cardiology Consultation  [address]  [phone]      07/21/23       Regarding:  Darlene Florence  1955      To Whom It May Concern,      Patient is Intermediate Cardiac Risk for planned surgery. Special instructions:  Patient is taking ASA/Brilinta and can be held for 5 days prior to procedure and resumed as soon as surgical bleeding risk has resolved. Please continue other meds.         Thank you,      Cheryle Owusu DO, FACC, RPVI, BARBIE, [facility]  Cardiology  [facility]    Electronically signed by Cheryle Owusu DO

## 2023-08-02 RX ORDER — HYDROCHLOROTHIAZIDE 12.5 MG/1
CAPSULE, GELATIN COATED ORAL
Qty: 90 CAPSULE | Refills: 3 | Status: SHIPPED | OUTPATIENT
Start: 2023-08-02

## 2023-08-02 NOTE — TELEPHONE ENCOUNTER
Per dispense report, last filled 10/17/22.      Albaro Fletcher called requesting a refill of the below medication which has been pended for you:     Requested Prescriptions     Pending Prescriptions Disp Refills    hydroCHLOROthiazide (MICROZIDE) 12.5 MG capsule [Pharmacy Med Name: hydroCHLOROthiazide 12.5 MG Oral Capsule] 90 capsule 3     Sig: TAKE 1 CAPSULE BY MOUTH IN THE  MORNING       Last Appointment Date: 3/14/2023  Next Appointment Date: 9/18/2023    No Known Allergies

## 2023-09-11 ENCOUNTER — HOSPITAL ENCOUNTER (OUTPATIENT)
Age: 68
Discharge: HOME | End: 2023-09-11
Payer: MEDICARE

## 2023-09-11 DIAGNOSIS — I10 PRIMARY HYPERTENSION: ICD-10-CM

## 2023-09-11 DIAGNOSIS — E78.2 MIXED HYPERLIPIDEMIA: ICD-10-CM

## 2023-09-11 DIAGNOSIS — N20.0 KIDNEY STONE: ICD-10-CM

## 2023-09-11 DIAGNOSIS — E11.8 TYPE 2 DIABETES MELLITUS WITH COMPLICATION, WITHOUT LONG-TERM CURRENT USE OF INSULIN (HCC): ICD-10-CM

## 2023-09-11 LAB
ALBUMIN SERPL-MCNC: 4.2 G/DL (ref 3.5–5.2)
ALBUMIN/GLOB SERPL: 1.7 {RATIO} (ref 1–2.5)
ALP SERPL-CCNC: 117 U/L (ref 35–104)
ALT SERPL-CCNC: 17 U/L (ref 5–33)
ANION GAP SERPL CALCULATED.3IONS-SCNC: 11 MMOL/L (ref 9–17)
AST SERPL-CCNC: 18 U/L
BASOPHILS # BLD: 0.03 K/UL (ref 0–0.2)
BASOPHILS NFR BLD: 0 % (ref 0–2)
BILIRUB SERPL-MCNC: 0.5 MG/DL (ref 0.3–1.2)
BUN SERPL-MCNC: 22 MG/DL (ref 8–23)
BUN/CREAT SERPL: 24 (ref 9–20)
CALCIUM SERPL-MCNC: 9.4 MG/DL (ref 8.6–10.4)
CHLORIDE SERPL-SCNC: 104 MMOL/L (ref 98–107)
CHOLEST SERPL-MCNC: 104 MG/DL
CHOLESTEROL/HDL RATIO: 2.9
CO2 SERPL-SCNC: 26 MMOL/L (ref 20–31)
CREAT SERPL-MCNC: 0.9 MG/DL (ref 0.5–0.9)
EOSINOPHIL # BLD: 0.21 K/UL (ref 0–0.44)
EOSINOPHILS RELATIVE PERCENT: 2 % (ref 1–4)
ERYTHROCYTE [DISTWIDTH] IN BLOOD BY AUTOMATED COUNT: 14 % (ref 11.8–14.4)
GFR SERPL CREATININE-BSD FRML MDRD: >60 ML/MIN/1.73M2
GLUCOSE SERPL-MCNC: 175 MG/DL (ref 70–99)
HCT VFR BLD AUTO: 37.7 % (ref 36.3–47.1)
HDLC SERPL-MCNC: 36 MG/DL
HGB BLD-MCNC: 12.5 G/DL (ref 11.9–15.1)
IMM GRANULOCYTES # BLD AUTO: 0.07 K/UL (ref 0–0.3)
IMM GRANULOCYTES NFR BLD: 1 %
LDLC SERPL CALC-MCNC: 20 MG/DL (ref 0–130)
LYMPHOCYTES NFR BLD: 2.23 K/UL (ref 1.1–3.7)
LYMPHOCYTES RELATIVE PERCENT: 24 % (ref 24–43)
MCH RBC QN AUTO: 28.6 PG (ref 25.2–33.5)
MCHC RBC AUTO-ENTMCNC: 33.2 G/DL (ref 25.2–33.5)
MCV RBC AUTO: 86.3 FL (ref 82.6–102.9)
MONOCYTES NFR BLD: 0.56 K/UL (ref 0.1–1.2)
MONOCYTES NFR BLD: 6 % (ref 3–12)
NEUTROPHILS NFR BLD: 67 % (ref 36–65)
NEUTS SEG NFR BLD: 6.08 K/UL (ref 1.5–8.1)
NRBC BLD-RTO: 0 PER 100 WBC
PLATELET # BLD AUTO: 259 K/UL (ref 138–453)
PMV BLD AUTO: 9.7 FL (ref 8.1–13.5)
POTASSIUM SERPL-SCNC: 4.1 MMOL/L (ref 3.7–5.3)
PROT SERPL-MCNC: 6.7 G/DL (ref 6.4–8.3)
RBC # BLD AUTO: 4.37 M/UL (ref 3.95–5.11)
SODIUM SERPL-SCNC: 141 MMOL/L (ref 135–144)
TRIGL SERPL-MCNC: 241 MG/DL
URATE SERPL-MCNC: 4.1 MG/DL (ref 2.4–5.7)
WBC OTHER # BLD: 9.2 K/UL (ref 3.5–11.3)

## 2023-09-11 PROCEDURE — 83036 HEMOGLOBIN GLYCOSYLATED A1C: CPT

## 2023-09-11 PROCEDURE — 84550 ASSAY OF BLOOD/URIC ACID: CPT

## 2023-09-11 PROCEDURE — 85025 COMPLETE CBC W/AUTO DIFF WBC: CPT

## 2023-09-11 PROCEDURE — 80053 COMPREHEN METABOLIC PANEL: CPT

## 2023-09-11 PROCEDURE — 80061 LIPID PANEL: CPT

## 2023-09-11 PROCEDURE — 36415 COLL VENOUS BLD VENIPUNCTURE: CPT

## 2023-09-12 LAB
EST. AVERAGE GLUCOSE BLD GHB EST-MCNC: 143 MG/DL
HBA1C MFR BLD: 6.6 % (ref 4–6)

## 2023-09-18 ENCOUNTER — OFFICE VISIT (OUTPATIENT)
Dept: FAMILY MEDICINE CLINIC | Age: 68
End: 2023-09-18
Payer: MEDICARE

## 2023-09-18 VITALS
OXYGEN SATURATION: 98 % | HEIGHT: 67 IN | HEART RATE: 68 BPM | SYSTOLIC BLOOD PRESSURE: 110 MMHG | BODY MASS INDEX: 29.79 KG/M2 | WEIGHT: 189.8 LBS | DIASTOLIC BLOOD PRESSURE: 64 MMHG

## 2023-09-18 DIAGNOSIS — N20.0 KIDNEY STONE: ICD-10-CM

## 2023-09-18 DIAGNOSIS — Z01.818 PREOPERATIVE GENERAL PHYSICAL EXAMINATION: ICD-10-CM

## 2023-09-18 DIAGNOSIS — I25.10 CORONARY ARTERY DISEASE INVOLVING NATIVE CORONARY ARTERY OF NATIVE HEART WITHOUT ANGINA PECTORIS: ICD-10-CM

## 2023-09-18 DIAGNOSIS — K21.9 GASTROESOPHAGEAL REFLUX DISEASE WITHOUT ESOPHAGITIS: ICD-10-CM

## 2023-09-18 DIAGNOSIS — I10 PRIMARY HYPERTENSION: ICD-10-CM

## 2023-09-18 DIAGNOSIS — E78.2 MIXED HYPERLIPIDEMIA: ICD-10-CM

## 2023-09-18 DIAGNOSIS — E11.8 TYPE 2 DIABETES MELLITUS WITH COMPLICATION, WITHOUT LONG-TERM CURRENT USE OF INSULIN (HCC): Primary | ICD-10-CM

## 2023-09-18 DIAGNOSIS — C64.1 RENAL CELL CARCINOMA OF RIGHT KIDNEY (HCC): ICD-10-CM

## 2023-09-18 PROCEDURE — 3017F COLORECTAL CA SCREEN DOC REV: CPT | Performed by: FAMILY MEDICINE

## 2023-09-18 PROCEDURE — 1123F ACP DISCUSS/DSCN MKR DOCD: CPT | Performed by: FAMILY MEDICINE

## 2023-09-18 PROCEDURE — 99214 OFFICE O/P EST MOD 30 MIN: CPT | Performed by: FAMILY MEDICINE

## 2023-09-18 PROCEDURE — G9708 BILAT MAST/HX BI /UNILAT MAS: HCPCS | Performed by: FAMILY MEDICINE

## 2023-09-18 PROCEDURE — 1090F PRES/ABSN URINE INCON ASSESS: CPT | Performed by: FAMILY MEDICINE

## 2023-09-18 PROCEDURE — 2022F DILAT RTA XM EVC RTNOPTHY: CPT | Performed by: FAMILY MEDICINE

## 2023-09-18 PROCEDURE — 99213 OFFICE O/P EST LOW 20 MIN: CPT | Performed by: FAMILY MEDICINE

## 2023-09-18 PROCEDURE — 3074F SYST BP LT 130 MM HG: CPT | Performed by: FAMILY MEDICINE

## 2023-09-18 PROCEDURE — G8427 DOCREV CUR MEDS BY ELIG CLIN: HCPCS | Performed by: FAMILY MEDICINE

## 2023-09-18 PROCEDURE — 1036F TOBACCO NON-USER: CPT | Performed by: FAMILY MEDICINE

## 2023-09-18 PROCEDURE — G8417 CALC BMI ABV UP PARAM F/U: HCPCS | Performed by: FAMILY MEDICINE

## 2023-09-18 PROCEDURE — 3044F HG A1C LEVEL LT 7.0%: CPT | Performed by: FAMILY MEDICINE

## 2023-09-18 PROCEDURE — G8399 PT W/DXA RESULTS DOCUMENT: HCPCS | Performed by: FAMILY MEDICINE

## 2023-09-18 PROCEDURE — 3078F DIAST BP <80 MM HG: CPT | Performed by: FAMILY MEDICINE

## 2023-09-18 ASSESSMENT — ENCOUNTER SYMPTOMS
EYES NEGATIVE: 1
CONSTIPATION: 0
BACK PAIN: 1
NAUSEA: 0
ALLERGIC/IMMUNOLOGIC NEGATIVE: 1
RESPIRATORY NEGATIVE: 1

## 2023-09-18 NOTE — PROGRESS NOTES
09/11/2023 37.7  36.3 - 47.1 % Final    MCV 09/11/2023 86.3  82.6 - 102.9 fL Final    MCH 09/11/2023 28.6  25.2 - 33.5 pg Final    MCHC 09/11/2023 33.2  25.2 - 33.5 g/dL Final    RDW 09/11/2023 14.0  11.8 - 14.4 % Final    Platelets 82/40/8054 259  138 - 453 k/uL Final    MPV 09/11/2023 9.7  8.1 - 13.5 fL Final    NRBC Automated 09/11/2023 0.0  0.0 per 100 WBC Final    Neutrophils % 09/11/2023 67 (H)  36 - 65 % Final    Lymphocytes % 09/11/2023 24  24 - 43 % Final    Monocytes % 09/11/2023 6  3 - 12 % Final    Eosinophils % 09/11/2023 2  1 - 4 % Final    Basophils % 09/11/2023 0  0 - 2 % Final    Immature Granulocytes 09/11/2023 1 (H)  0 % Final    Neutrophils Absolute 09/11/2023 6.08  1.50 - 8.10 k/uL Final    Lymphocytes Absolute 09/11/2023 2.23  1.10 - 3.70 k/uL Final    Monocytes Absolute 09/11/2023 0.56  0.10 - 1.20 k/uL Final    Eosinophils Absolute 09/11/2023 0.21  0.00 - 0.44 k/uL Final    Basophils Absolute 09/11/2023 0.03  0.00 - 0.20 k/uL Final    Absolute Immature Granulocyte 09/11/2023 0.07  0.00 - 0.30 k/uL Final    Sodium 09/11/2023 141  135 - 144 mmol/L Final    Potassium 09/11/2023 4.1  3.7 - 5.3 mmol/L Final    Chloride 09/11/2023 104  98 - 107 mmol/L Final    CO2 09/11/2023 26  20 - 31 mmol/L Final    Anion Gap 09/11/2023 11  9 - 17 mmol/L Final    Glucose 09/11/2023 175 (H)  70 - 99 mg/dL Final    BUN 09/11/2023 22  8 - 23 mg/dL Final    Creatinine 09/11/2023 0.9  0.5 - 0.9 mg/dL Final    Est, Glom Filt Rate 09/11/2023 >60  >60 mL/min/1.73m2 Final    Bun/Cre Ratio 09/11/2023 24 (H)  9 - 20 Final    Calcium 09/11/2023 9.4  8.6 - 10.4 mg/dL Final    Total Protein 09/11/2023 6.7  6.4 - 8.3 g/dL Final    Albumin 09/11/2023 4.2  3.5 - 5.2 g/dL Final    Albumin/Globulin Ratio 09/11/2023 1.7  1.0 - 2.5 Final    Total Bilirubin 09/11/2023 0.5  0.3 - 1.2 mg/dL Final    Alkaline Phosphatase 09/11/2023 117 (H)  35 - 104 U/L Final    ALT 09/11/2023 17  5 - 33 U/L Final    AST 09/11/2023 18  <32 U/L Final

## 2023-09-18 NOTE — PATIENT INSTRUCTIONS
Hospital Outpatient Visit on 09/11/2023   Component Date Value Ref Range Status    Uric Acid 09/11/2023 4.1  2.4 - 5.7 mg/dL Final    Hemoglobin A1C 09/11/2023 6.6 (H)  4.0 - 6.0 % Final    Estimated Avg Glucose 09/11/2023 143  mg/dL Final    Comment: The ADA and AACC recommend providing the estimated average glucose result to permit better   patient understanding of their HBA1c result.       WBC 09/11/2023 9.2  3.5 - 11.3 k/uL Final    RBC 09/11/2023 4.37  3.95 - 5.11 m/uL Final    Hemoglobin 09/11/2023 12.5  11.9 - 15.1 g/dL Final    Hematocrit 09/11/2023 37.7  36.3 - 47.1 % Final    MCV 09/11/2023 86.3  82.6 - 102.9 fL Final    MCH 09/11/2023 28.6  25.2 - 33.5 pg Final    MCHC 09/11/2023 33.2  25.2 - 33.5 g/dL Final    RDW 09/11/2023 14.0  11.8 - 14.4 % Final    Platelets 27/87/3522 259  138 - 453 k/uL Final    MPV 09/11/2023 9.7  8.1 - 13.5 fL Final    NRBC Automated 09/11/2023 0.0  0.0 per 100 WBC Final    Neutrophils % 09/11/2023 67 (H)  36 - 65 % Final    Lymphocytes % 09/11/2023 24  24 - 43 % Final    Monocytes % 09/11/2023 6  3 - 12 % Final    Eosinophils % 09/11/2023 2  1 - 4 % Final    Basophils % 09/11/2023 0  0 - 2 % Final    Immature Granulocytes 09/11/2023 1 (H)  0 % Final    Neutrophils Absolute 09/11/2023 6.08  1.50 - 8.10 k/uL Final    Lymphocytes Absolute 09/11/2023 2.23  1.10 - 3.70 k/uL Final    Monocytes Absolute 09/11/2023 0.56  0.10 - 1.20 k/uL Final    Eosinophils Absolute 09/11/2023 0.21  0.00 - 0.44 k/uL Final    Basophils Absolute 09/11/2023 0.03  0.00 - 0.20 k/uL Final    Absolute Immature Granulocyte 09/11/2023 0.07  0.00 - 0.30 k/uL Final    Sodium 09/11/2023 141  135 - 144 mmol/L Final    Potassium 09/11/2023 4.1  3.7 - 5.3 mmol/L Final    Chloride 09/11/2023 104  98 - 107 mmol/L Final    CO2 09/11/2023 26  20 - 31 mmol/L Final    Anion Gap 09/11/2023 11  9 - 17 mmol/L Final    Glucose 09/11/2023 175 (H)  70 - 99 mg/dL Final    BUN 09/11/2023 22  8 - 23 mg/dL Final    Creatinine

## 2023-09-19 ENCOUNTER — TELEPHONE (OUTPATIENT)
Dept: FAMILY MEDICINE CLINIC | Age: 68
End: 2023-09-19

## 2023-09-19 NOTE — TELEPHONE ENCOUNTER
----- Message from Devyn Carmona sent at 9/19/2023  8:29 AM EDT -----  Subject: Message to Provider    QUESTIONS  Information for Provider? Julieta Francois form Dr. Hannah Patel office is needing the   patients pre-op faxed to them. Her surgery is on 9/25. Fax# [phone]  ---------------------------------------------------------------------------  --------------  [facility]  [phone]; OK to leave message on voicemail  ---------------------------------------------------------------------------  --------------  SCRIPT ANSWERS  Relationship to Patient? Covered Entity  Covered Entity Type? Physician Office? Representative Name?  Julieta Francois

## 2023-09-20 ENCOUNTER — TELEPHONE (OUTPATIENT)
Dept: SURGERY | Age: 68
End: 2023-09-20

## 2023-09-20 NOTE — TELEPHONE ENCOUNTER
Writer called patient to see if she was ready to get her colonoscopy set up. Patient is getting shoulder surgery done on 9/25/23. So she would like to hold off until next year and she will give us a call.

## 2023-11-03 ENCOUNTER — OFFICE VISIT (OUTPATIENT)
Dept: FAMILY MEDICINE CLINIC | Age: 68
End: 2023-11-03
Payer: MEDICARE

## 2023-11-03 VITALS
SYSTOLIC BLOOD PRESSURE: 128 MMHG | HEART RATE: 68 BPM | BODY MASS INDEX: 30.2 KG/M2 | OXYGEN SATURATION: 97 % | WEIGHT: 192.4 LBS | HEIGHT: 67 IN | DIASTOLIC BLOOD PRESSURE: 70 MMHG

## 2023-11-03 DIAGNOSIS — Z01.818 PREOPERATIVE GENERAL PHYSICAL EXAMINATION: Primary | ICD-10-CM

## 2023-11-03 PROCEDURE — G8427 DOCREV CUR MEDS BY ELIG CLIN: HCPCS | Performed by: FAMILY MEDICINE

## 2023-11-03 PROCEDURE — 3074F SYST BP LT 130 MM HG: CPT | Performed by: FAMILY MEDICINE

## 2023-11-03 PROCEDURE — G9708 BILAT MAST/HX BI /UNILAT MAS: HCPCS | Performed by: FAMILY MEDICINE

## 2023-11-03 PROCEDURE — 3017F COLORECTAL CA SCREEN DOC REV: CPT | Performed by: FAMILY MEDICINE

## 2023-11-03 PROCEDURE — 1123F ACP DISCUSS/DSCN MKR DOCD: CPT | Performed by: FAMILY MEDICINE

## 2023-11-03 PROCEDURE — G8417 CALC BMI ABV UP PARAM F/U: HCPCS | Performed by: FAMILY MEDICINE

## 2023-11-03 PROCEDURE — G8399 PT W/DXA RESULTS DOCUMENT: HCPCS | Performed by: FAMILY MEDICINE

## 2023-11-03 PROCEDURE — 99213 OFFICE O/P EST LOW 20 MIN: CPT | Performed by: FAMILY MEDICINE

## 2023-11-03 PROCEDURE — 1036F TOBACCO NON-USER: CPT | Performed by: FAMILY MEDICINE

## 2023-11-03 PROCEDURE — 3078F DIAST BP <80 MM HG: CPT | Performed by: FAMILY MEDICINE

## 2023-11-03 PROCEDURE — 1090F PRES/ABSN URINE INCON ASSESS: CPT | Performed by: FAMILY MEDICINE

## 2023-11-03 PROCEDURE — 99212 OFFICE O/P EST SF 10 MIN: CPT | Performed by: FAMILY MEDICINE

## 2023-11-03 PROCEDURE — G8483 FLU IMM NO ADMIN DOC REA: HCPCS | Performed by: FAMILY MEDICINE

## 2023-11-03 ASSESSMENT — ENCOUNTER SYMPTOMS
RESPIRATORY NEGATIVE: 1
GASTROINTESTINAL NEGATIVE: 1

## 2023-11-13 ENCOUNTER — OFFICE VISIT (OUTPATIENT)
Dept: CARDIOLOGY | Age: 68
End: 2023-11-13
Payer: MEDICARE

## 2023-11-13 VITALS
BODY MASS INDEX: 29.51 KG/M2 | SYSTOLIC BLOOD PRESSURE: 103 MMHG | HEART RATE: 81 BPM | WEIGHT: 188 LBS | HEIGHT: 67 IN | DIASTOLIC BLOOD PRESSURE: 61 MMHG

## 2023-11-13 DIAGNOSIS — E78.00 PURE HYPERCHOLESTEROLEMIA: ICD-10-CM

## 2023-11-13 DIAGNOSIS — Z95.5 PRESENCE OF CORONARY ANGIOPLASTY IMPLANT AND GRAFT: ICD-10-CM

## 2023-11-13 DIAGNOSIS — I25.2 OLD MYOCARDIAL INFARCTION: ICD-10-CM

## 2023-11-13 DIAGNOSIS — I25.10 CORONARY ARTERY DISEASE INVOLVING NATIVE CORONARY ARTERY OF NATIVE HEART WITHOUT ANGINA PECTORIS: Primary | ICD-10-CM

## 2023-11-13 DIAGNOSIS — E11.00 TYPE 2 DIABETES MELLITUS WITH HYPEROSMOLARITY WITHOUT COMA, UNSPECIFIED WHETHER LONG TERM INSULIN USE (HCC): ICD-10-CM

## 2023-11-13 DIAGNOSIS — I10 HYPERTENSION, ESSENTIAL: ICD-10-CM

## 2023-11-13 DIAGNOSIS — E11.9 CONTROLLED TYPE 2 DIABETES MELLITUS WITHOUT COMPLICATION, UNSPECIFIED WHETHER LONG TERM INSULIN USE (HCC): ICD-10-CM

## 2023-11-13 PROCEDURE — G8417 CALC BMI ABV UP PARAM F/U: HCPCS | Performed by: INTERNAL MEDICINE

## 2023-11-13 PROCEDURE — 93010 ELECTROCARDIOGRAM REPORT: CPT | Performed by: INTERNAL MEDICINE

## 2023-11-13 PROCEDURE — G8427 DOCREV CUR MEDS BY ELIG CLIN: HCPCS | Performed by: INTERNAL MEDICINE

## 2023-11-13 PROCEDURE — 3044F HG A1C LEVEL LT 7.0%: CPT | Performed by: INTERNAL MEDICINE

## 2023-11-13 PROCEDURE — 93005 ELECTROCARDIOGRAM TRACING: CPT | Performed by: INTERNAL MEDICINE

## 2023-11-13 PROCEDURE — G9708 BILAT MAST/HX BI /UNILAT MAS: HCPCS | Performed by: INTERNAL MEDICINE

## 2023-11-13 PROCEDURE — 3078F DIAST BP <80 MM HG: CPT | Performed by: INTERNAL MEDICINE

## 2023-11-13 PROCEDURE — 2022F DILAT RTA XM EVC RTNOPTHY: CPT | Performed by: INTERNAL MEDICINE

## 2023-11-13 PROCEDURE — G8399 PT W/DXA RESULTS DOCUMENT: HCPCS | Performed by: INTERNAL MEDICINE

## 2023-11-13 PROCEDURE — 1090F PRES/ABSN URINE INCON ASSESS: CPT | Performed by: INTERNAL MEDICINE

## 2023-11-13 PROCEDURE — 3074F SYST BP LT 130 MM HG: CPT | Performed by: INTERNAL MEDICINE

## 2023-11-13 PROCEDURE — G8483 FLU IMM NO ADMIN DOC REA: HCPCS | Performed by: INTERNAL MEDICINE

## 2023-11-13 PROCEDURE — 3017F COLORECTAL CA SCREEN DOC REV: CPT | Performed by: INTERNAL MEDICINE

## 2023-11-13 PROCEDURE — 1036F TOBACCO NON-USER: CPT | Performed by: INTERNAL MEDICINE

## 2023-11-13 PROCEDURE — 99212 OFFICE O/P EST SF 10 MIN: CPT | Performed by: INTERNAL MEDICINE

## 2023-11-13 PROCEDURE — 1123F ACP DISCUSS/DSCN MKR DOCD: CPT | Performed by: INTERNAL MEDICINE

## 2023-11-13 PROCEDURE — 99214 OFFICE O/P EST MOD 30 MIN: CPT | Performed by: INTERNAL MEDICINE

## 2023-11-13 NOTE — PROGRESS NOTES
Cardiology Consultation/Follow Up. [address]  1955  [id number]    Today: 11/13/23    CC: Patient is here for follow up for CAD s/p PCI. HPI:   Ernestine Garza is here for follow up for CAD s/p PCI. She has no cp or sob. Had stress test on 11/22. States she is now on a lower dose of her BP med and since then has felt much better. Past Medical:  Past Medical History:   Diagnosis Date    CAD (coronary artery disease) 12/03/2021    MI, stents    Cancer ([address]) 2006    Right Breast cancer    Cervical radiculopathy     DDD (degenerative disc disease)     cervical    GERD (gastroesophageal reflux disease)     Hyperlipidemia     Hypertension     Impaired fasting blood sugar     Kidney stone     Myopia with astigmatism and presbyopia     Obesity     Osteopenia     Renal cell carcinoma ([address]) 06/10/2013    right kidney: clear cell type. Corine grade 2 of 4.  2.5cm limited to kidney    Stenosis of cervical spine with myelopathy (HCC)     Stress incontinence     Type 2 diabetes mellitus without complication (HCC)     Uric acid renal calculus     Wears glasses        Past Surgical:  Past Surgical History:   Procedure Laterality Date    BLADDER SUSPENSION  2011    incontinence    BREAST BIOPSY Right 11/20/2007    wire localization     CATARACT REMOVAL Right 11/08/2023    [facility] Dr. Les Amaral    CERVICAL [facility]  05/11/2018     ANTERIOR C4-5, C5-6 ARTHROPLASTY, (ONE PLATE AND FOUR SCREWS REMOVED C6-C7 AND DISCARDED    COLONOSCOPY  05/19/2008    COLONOSCOPY  05/03/2017    [id number]    CORONARY ANGIOPLASTY  12/03/2021    CORONARY ANGIOPLASTY  12/21/2021    CORONARY ANGIOPLASTY WITH STENT PLACEMENT  12/03/2021    CYSTOURETHROSCOPY Bilateral 09/02/2021    ENDOSCOPY, COLON, DIAGNOSTIC      HYSTERECTOMY, TOTAL ABDOMINAL (CERVIX REMOVED)  2011    with bladder lift    MASTECTOMY Bilateral 12/11/2007    lymphnodes were also removed    NECK SURGERY  06/30/2010

## 2023-11-28 ENCOUNTER — TELEPHONE (OUTPATIENT)
Dept: CARDIOLOGY | Age: 68
End: 2023-11-28

## 2023-11-28 NOTE — TELEPHONE ENCOUNTER
Received cardiac clearance request from [facility] regarding eye surgery for patient. Please review the request scanned to media and advise.      Last Appt:  11/13/2023  Next Appt:   5/20/2024  Med verified in [address] [place] [address]

## 2023-11-30 NOTE — TELEPHONE ENCOUNTER
Cardiology Consultation  [address], [address] [place] [address]  [phone]      11/30/23      Regarding:  Rommie Marker  1955      To Whom It May Concern,      Patient is Low Cardiac Risk for planned surgery. Special instructions:  Patient is taking Brilinta and Aspirin  and can be held for 5-7 days prior to procedure and resumed as soon as surgical bleeding risk has resolved. Please continue other meds.         Thank you,      Dr. Viry Howard, DO  Cardiology    Electronically signed by Viry Howard, [address]

## 2024-01-08 RX ORDER — SEMAGLUTIDE 0.68 MG/ML
INJECTION, SOLUTION SUBCUTANEOUS
Qty: 9 ML | Refills: 3 | Status: SHIPPED | OUTPATIENT
Start: 2024-01-08

## 2024-01-08 NOTE — TELEPHONE ENCOUNTER
Nasrin called requesting a refill of the below medication which has been pended for you:     Requested Prescriptions     Pending Prescriptions Disp Refills    OZEMPIC, 0.25 OR 0.5 MG/DOSE, 2 MG/3ML SOPN [Pharmacy Med Name: OZEMPIC  0.25MG 0.5MG SOLUTION PEN-INJECTOR  PEN DOSE] 9 mL 3     Sig: INJECT SUBCUTANEOUSLY 0.5 MG  EVERY WEEK       Last Appointment Date: 11/3/2023  Next Appointment Date: 3/18/2024    No Known Allergies

## 2024-01-08 NOTE — TELEPHONE ENCOUNTER
Pt states she is doing okay at this dose and would like to stay at this dose and discuss at upcoming appt in March.

## 2024-01-09 PROBLEM — M25.511 PAIN OF RIGHT SHOULDER REGION: Status: ACTIVE | Noted: 2023-08-28

## 2024-01-09 PROBLEM — H25.813 COMBINED FORMS OF AGE-RELATED CATARACT OF BOTH EYES: Status: ACTIVE | Noted: 2023-10-05

## 2024-01-09 PROBLEM — M19.019 OSTEOARTHRITIS OF GLENOHUMERAL JOINT: Status: ACTIVE | Noted: 2023-09-25

## 2024-01-09 PROBLEM — G89.18 OTHER ACUTE POSTPROCEDURAL PAIN: Status: ACTIVE | Noted: 2023-09-25

## 2024-01-09 PROBLEM — R52 PAIN: Status: ACTIVE | Noted: 2024-01-09

## 2024-01-09 PROBLEM — M54.6 PAIN IN THORACIC SPINE: Status: ACTIVE | Noted: 2017-12-14

## 2024-01-09 PROBLEM — Z96.60 HISTORY OF ARTIFICIAL JOINT: Status: ACTIVE | Noted: 2023-09-25

## 2024-01-09 RX ORDER — EZETIMIBE 10 MG/1
TABLET ORAL
Qty: 90 TABLET | Refills: 3 | Status: SHIPPED | OUTPATIENT
Start: 2024-01-09

## 2024-01-09 RX ORDER — OMEPRAZOLE 20 MG/1
CAPSULE, DELAYED RELEASE ORAL
Qty: 90 CAPSULE | Refills: 3 | Status: SHIPPED | OUTPATIENT
Start: 2024-01-09

## 2024-01-09 RX ORDER — ISOSORBIDE DINITRATE 30 MG/1
30 TABLET ORAL DAILY
COMMUNITY

## 2024-01-09 RX ORDER — GLIMEPIRIDE 2 MG/1
TABLET ORAL
Qty: 180 TABLET | Refills: 3 | Status: SHIPPED | OUTPATIENT
Start: 2024-01-09

## 2024-01-09 RX ORDER — ALLOPURINOL 300 MG/1
300 TABLET ORAL DAILY
Qty: 90 TABLET | Refills: 3 | Status: SHIPPED | OUTPATIENT
Start: 2024-01-09

## 2024-01-09 RX ORDER — LISINOPRIL 40 MG/1
40 TABLET ORAL DAILY
Qty: 90 TABLET | Refills: 3 | Status: SHIPPED | OUTPATIENT
Start: 2024-01-09

## 2024-01-09 RX ORDER — ATORVASTATIN CALCIUM 40 MG/1
40 TABLET, FILM COATED ORAL NIGHTLY
Qty: 90 TABLET | Refills: 3 | Status: SHIPPED | OUTPATIENT
Start: 2024-01-09

## 2024-01-09 NOTE — TELEPHONE ENCOUNTER
Nasrin called requesting a refill of the below medication which has been pended for you:     Requested Prescriptions     Pending Prescriptions Disp Refills    atorvastatin (LIPITOR) 40 MG tablet [Pharmacy Med Name: Atorvastatin Calcium 40 MG Oral Tablet] 90 tablet 3     Sig: TAKE 1 TABLET BY MOUTH AT NIGHT    ezetimibe (ZETIA) 10 MG tablet [Pharmacy Med Name: Ezetimibe 10 MG Oral Tablet] 90 tablet 3     Sig: TAKE 1 TABLET BY MOUTH IN THE  MORNING    glimepiride (AMARYL) 2 MG tablet [Pharmacy Med Name: Glimepiride 2 MG Oral Tablet] 180 tablet 3     Sig: TAKE 1 TABLET BY MOUTH TWICE  DAILY WITH MEALS    omeprazole (PRILOSEC) 20 MG delayed release capsule [Pharmacy Med Name: Omeprazole 20 MG Oral Capsule Delayed Release] 90 capsule 3     Sig: TAKE 1 CAPSULE BY MOUTH DAILY    allopurinol (ZYLOPRIM) 300 MG tablet [Pharmacy Med Name: Allopurinol 300 MG Oral Tablet] 90 tablet 3     Sig: TAKE 1 TABLET BY MOUTH DAILY    lisinopril (PRINIVIL;ZESTRIL) 40 MG tablet [Pharmacy Med Name: Lisinopril 40 MG Oral Tablet] 90 tablet 3     Sig: TAKE 1 TABLET BY MOUTH DAILY       Last Appointment Date: 11/3/2023  Next Appointment Date: 3/18/2024    No Known Allergies

## 2024-01-22 DIAGNOSIS — I15.8 OTHER SECONDARY HYPERTENSION: ICD-10-CM

## 2024-01-22 DIAGNOSIS — I21.3 ST ELEVATION MYOCARDIAL INFARCTION (STEMI), UNSPECIFIED ARTERY (HCC): ICD-10-CM

## 2024-01-22 DIAGNOSIS — E78.2 MIXED HYPERLIPIDEMIA: ICD-10-CM

## 2024-01-22 RX ORDER — IBANDRONATE SODIUM 150 MG/1
TABLET, FILM COATED ORAL
Qty: 3 TABLET | Refills: 3 | Status: SHIPPED | OUTPATIENT
Start: 2024-01-22

## 2024-01-22 RX ORDER — ISOSORBIDE MONONITRATE 30 MG/1
TABLET, EXTENDED RELEASE ORAL
Qty: 90 TABLET | Refills: 3 | Status: SHIPPED | OUTPATIENT
Start: 2024-01-22

## 2024-01-22 RX ORDER — OMEPRAZOLE 20 MG/1
20 CAPSULE, DELAYED RELEASE ORAL DAILY
COMMUNITY
Start: 2019-12-05

## 2024-01-22 RX ORDER — CARVEDILOL 25 MG/1
25 TABLET ORAL 2 TIMES DAILY
Qty: 180 TABLET | Refills: 3 | Status: SHIPPED | OUTPATIENT
Start: 2024-01-22

## 2024-01-22 RX ORDER — TICAGRELOR 60 MG/1
60 TABLET ORAL 2 TIMES DAILY
Qty: 180 TABLET | Refills: 3 | Status: SHIPPED | OUTPATIENT
Start: 2024-01-22

## 2024-01-22 RX ORDER — NITROGLYCERIN 0.4 MG/1
0.4 TABLET SUBLINGUAL EVERY 5 MIN PRN
COMMUNITY
Start: 2022-04-26

## 2024-01-22 NOTE — TELEPHONE ENCOUNTER
[facility] Emergency Department    [address]    [facility] [address]    Phone:  [phone]    Fax:  [phone]                                       René Kumar   MRN: [id number]    Department:  [facility] Emergency Department   Date of Visit:  2/26/2018           After Visit Summary Signature Page     I have received my discharge instructions, and my questions have been answered. I have discussed any challenges I see with this plan with the nurse or doctor.    ..........................................................................................................................................  Patient/Patient Representative Signature      ..........................................................................................................................................  Patient Representative Print Name and Relationship to Patient    ..................................................               ................................................  Date                                            Time    ..........................................................................................................................................  Reviewed by Signature/Title    ...................................................              ..............................................  Date                                                            Time           Nasrin called requesting a refill of the below medication which has been pended for you:     Requested Prescriptions     Pending Prescriptions Disp Refills    isosorbide mononitrate (IMDUR) 30 MG extended release tablet [Pharmacy Med Name: Isosorbide Mononitrate ER 30 MG Oral Tablet Extended Release 24 Hour] 90 tablet 3     Sig: TAKE 1 TABLET BY MOUTH DAILY    carvedilol (COREG) 25 MG tablet [Pharmacy Med Name: Carvedilol 25 MG Oral Tablet] 180 tablet 3     Sig: TAKE 1 TABLET BY MOUTH TWICE  DAILY    ibandronate (BONIVA) 150 MG tablet [Pharmacy Med Name: IBANDRONATE  150MG  TAB] 3 tablet 3     Si TABLET BY MOUTH MONTHLY WITH 8 OZ OF PLAIN WATER 60 MINUTES  BEFORE FIRST FOOD, DRINK OR  MEDS. STAY UPRIGHT FOR 60 MINS       Last Appointment Date: 11/3/2023  Next Appointment Date: 3/18/2024    No Known Allergies

## 2024-02-08 ENCOUNTER — ENROLLMENT (OUTPATIENT)
Dept: PHARMACY | Facility: CLINIC | Age: 69
End: 2024-02-08

## 2024-02-08 PROBLEM — R52 PAIN: Status: RESOLVED | Noted: 2024-01-09 | Resolved: 2024-02-08

## 2024-02-23 PROBLEM — I25.2 HISTORY OF ACUTE MYOCARDIAL INFARCTION: Status: ACTIVE | Noted: 2021-12-03

## 2024-03-14 ENCOUNTER — HOSPITAL ENCOUNTER (OUTPATIENT)
Age: 69
Discharge: HOME | End: 2024-03-14
Payer: MEDICARE

## 2024-03-14 DIAGNOSIS — E78.2 MIXED HYPERLIPIDEMIA: ICD-10-CM

## 2024-03-14 DIAGNOSIS — I10 PRIMARY HYPERTENSION: ICD-10-CM

## 2024-03-14 DIAGNOSIS — E11.8 TYPE 2 DIABETES MELLITUS WITH COMPLICATION, WITHOUT LONG-TERM CURRENT USE OF INSULIN (HCC): ICD-10-CM

## 2024-03-14 LAB
ALBUMIN SERPL-MCNC: 4.3 G/DL (ref 3.5–5.2)
ALBUMIN/GLOB SERPL: 1.6 {RATIO} (ref 1–2.5)
ALP SERPL-CCNC: 108 U/L (ref 35–104)
ALT SERPL-CCNC: 15 U/L (ref 5–33)
ANION GAP SERPL CALCULATED.3IONS-SCNC: 10 MMOL/L (ref 9–17)
AST SERPL-CCNC: 16 U/L
BASOPHILS # BLD: 0.03 K/UL (ref 0–0.2)
BASOPHILS NFR BLD: 0 % (ref 0–2)
BILIRUB SERPL-MCNC: 0.5 MG/DL (ref 0.3–1.2)
BUN SERPL-MCNC: 20 MG/DL (ref 8–23)
BUN/CREAT SERPL: 29 (ref 9–20)
CALCIUM SERPL-MCNC: 9.5 MG/DL (ref 8.6–10.4)
CHLORIDE SERPL-SCNC: 102 MMOL/L (ref 98–107)
CHOLEST SERPL-MCNC: 128 MG/DL (ref 0–199)
CHOLESTEROL/HDL RATIO: 3
CO2 SERPL-SCNC: 28 MMOL/L (ref 20–31)
CREAT SERPL-MCNC: 0.7 MG/DL (ref 0.5–0.9)
CREAT UR-MCNC: 298 MG/DL (ref 28–217)
EOSINOPHIL # BLD: 0.15 K/UL (ref 0–0.44)
EOSINOPHILS RELATIVE PERCENT: 2 % (ref 1–4)
ERYTHROCYTE [DISTWIDTH] IN BLOOD BY AUTOMATED COUNT: 14.5 % (ref 11.8–14.4)
EST. AVERAGE GLUCOSE BLD GHB EST-MCNC: 140 MG/DL
GFR SERPL CREATININE-BSD FRML MDRD: >60 ML/MIN/1.73M2
GLUCOSE SERPL-MCNC: 163 MG/DL (ref 70–99)
HBA1C MFR BLD: 6.5 % (ref 4–6)
HCT VFR BLD AUTO: 38.8 % (ref 36.3–47.1)
HDLC SERPL-MCNC: 42 MG/DL
HGB BLD-MCNC: 13 G/DL (ref 11.9–15.1)
IMM GRANULOCYTES # BLD AUTO: 0.03 K/UL (ref 0–0.3)
IMM GRANULOCYTES NFR BLD: 0 %
LDLC SERPL CALC-MCNC: 66 MG/DL (ref 0–100)
LYMPHOCYTES NFR BLD: 2.18 K/UL (ref 1.1–3.7)
LYMPHOCYTES RELATIVE PERCENT: 29 % (ref 24–43)
MCH RBC QN AUTO: 28.1 PG (ref 25.2–33.5)
MCHC RBC AUTO-ENTMCNC: 33.5 G/DL (ref 25.2–33.5)
MCV RBC AUTO: 84 FL (ref 82.6–102.9)
MICROALBUMIN UR-MCNC: <12 MG/L (ref 0–20)
MICROALBUMIN/CREAT UR-RTO: ABNORMAL MCG/MG CREAT (ref 0–25)
MONOCYTES NFR BLD: 0.38 K/UL (ref 0.1–1.2)
MONOCYTES NFR BLD: 5 % (ref 3–12)
NEUTROPHILS NFR BLD: 64 % (ref 36–65)
NEUTS SEG NFR BLD: 4.76 K/UL (ref 1.5–8.1)
NRBC BLD-RTO: 0 PER 100 WBC
PLATELET # BLD AUTO: 250 K/UL (ref 138–453)
PMV BLD AUTO: 9.8 FL (ref 8.1–13.5)
POTASSIUM SERPL-SCNC: 3.9 MMOL/L (ref 3.7–5.3)
PROT SERPL-MCNC: 7 G/DL (ref 6.4–8.3)
RBC # BLD AUTO: 4.62 M/UL (ref 3.95–5.11)
RBC # BLD: ABNORMAL 10*6/UL
SODIUM SERPL-SCNC: 140 MMOL/L (ref 135–144)
TRIGL SERPL-MCNC: 104 MG/DL
VLDLC SERPL CALC-MCNC: 21 MG/DL
WBC OTHER # BLD: 7.5 K/UL (ref 3.5–11.3)

## 2024-03-14 PROCEDURE — 82570 ASSAY OF URINE CREATININE: CPT

## 2024-03-14 PROCEDURE — 80053 COMPREHEN METABOLIC PANEL: CPT

## 2024-03-14 PROCEDURE — 85025 COMPLETE CBC W/AUTO DIFF WBC: CPT

## 2024-03-14 PROCEDURE — 83036 HEMOGLOBIN GLYCOSYLATED A1C: CPT

## 2024-03-14 PROCEDURE — 80061 LIPID PANEL: CPT

## 2024-03-14 PROCEDURE — 36415 COLL VENOUS BLD VENIPUNCTURE: CPT

## 2024-03-14 PROCEDURE — 82043 UR ALBUMIN QUANTITATIVE: CPT

## 2024-03-18 ENCOUNTER — OFFICE VISIT (OUTPATIENT)
Dept: FAMILY MEDICINE CLINIC | Age: 69
End: 2024-03-18
Payer: MEDICARE

## 2024-03-18 VITALS
HEIGHT: 67 IN | OXYGEN SATURATION: 99 % | DIASTOLIC BLOOD PRESSURE: 60 MMHG | WEIGHT: 190.8 LBS | BODY MASS INDEX: 29.95 KG/M2 | SYSTOLIC BLOOD PRESSURE: 128 MMHG | HEART RATE: 78 BPM

## 2024-03-18 DIAGNOSIS — I10 PRIMARY HYPERTENSION: ICD-10-CM

## 2024-03-18 DIAGNOSIS — E78.2 MIXED HYPERLIPIDEMIA: ICD-10-CM

## 2024-03-18 DIAGNOSIS — N20.0 KIDNEY STONE: ICD-10-CM

## 2024-03-18 DIAGNOSIS — E11.00 TYPE 2 DIABETES MELLITUS WITH HYPEROSMOLARITY WITHOUT COMA, UNSPECIFIED WHETHER LONG TERM INSULIN USE (HCC): Primary | ICD-10-CM

## 2024-03-18 DIAGNOSIS — C64.1 RENAL CELL CARCINOMA OF RIGHT KIDNEY (HCC): ICD-10-CM

## 2024-03-18 DIAGNOSIS — K21.9 GASTROESOPHAGEAL REFLUX DISEASE WITHOUT ESOPHAGITIS: ICD-10-CM

## 2024-03-18 DIAGNOSIS — I25.10 CORONARY ARTERY DISEASE INVOLVING NATIVE CORONARY ARTERY OF NATIVE HEART WITHOUT ANGINA PECTORIS: ICD-10-CM

## 2024-03-18 PROCEDURE — 1123F ACP DISCUSS/DSCN MKR DOCD: CPT | Performed by: FAMILY MEDICINE

## 2024-03-18 PROCEDURE — 1036F TOBACCO NON-USER: CPT | Performed by: FAMILY MEDICINE

## 2024-03-18 PROCEDURE — 3017F COLORECTAL CA SCREEN DOC REV: CPT | Performed by: FAMILY MEDICINE

## 2024-03-18 PROCEDURE — G8399 PT W/DXA RESULTS DOCUMENT: HCPCS | Performed by: FAMILY MEDICINE

## 2024-03-18 PROCEDURE — G9708 BILAT MAST/HX BI /UNILAT MAS: HCPCS | Performed by: FAMILY MEDICINE

## 2024-03-18 PROCEDURE — 2022F DILAT RTA XM EVC RTNOPTHY: CPT | Performed by: FAMILY MEDICINE

## 2024-03-18 PROCEDURE — 3078F DIAST BP <80 MM HG: CPT | Performed by: FAMILY MEDICINE

## 2024-03-18 PROCEDURE — 1090F PRES/ABSN URINE INCON ASSESS: CPT | Performed by: FAMILY MEDICINE

## 2024-03-18 PROCEDURE — 99214 OFFICE O/P EST MOD 30 MIN: CPT | Performed by: FAMILY MEDICINE

## 2024-03-18 PROCEDURE — G8417 CALC BMI ABV UP PARAM F/U: HCPCS | Performed by: FAMILY MEDICINE

## 2024-03-18 PROCEDURE — G8483 FLU IMM NO ADMIN DOC REA: HCPCS | Performed by: FAMILY MEDICINE

## 2024-03-18 PROCEDURE — 3044F HG A1C LEVEL LT 7.0%: CPT | Performed by: FAMILY MEDICINE

## 2024-03-18 PROCEDURE — 3074F SYST BP LT 130 MM HG: CPT | Performed by: FAMILY MEDICINE

## 2024-03-18 PROCEDURE — 99213 OFFICE O/P EST LOW 20 MIN: CPT | Performed by: FAMILY MEDICINE

## 2024-03-18 PROCEDURE — G8427 DOCREV CUR MEDS BY ELIG CLIN: HCPCS | Performed by: FAMILY MEDICINE

## 2024-03-18 SDOH — ECONOMIC STABILITY: INCOME INSECURITY: HOW HARD IS IT FOR YOU TO PAY FOR THE VERY BASICS LIKE FOOD, HOUSING, MEDICAL CARE, AND HEATING?: NOT HARD AT ALL

## 2024-03-18 SDOH — ECONOMIC STABILITY: FOOD INSECURITY: WITHIN THE PAST 12 MONTHS, THE FOOD YOU BOUGHT JUST DIDN'T LAST AND YOU DIDN'T HAVE MONEY TO GET MORE.: NEVER TRUE

## 2024-03-18 SDOH — ECONOMIC STABILITY: FOOD INSECURITY: WITHIN THE PAST 12 MONTHS, YOU WORRIED THAT YOUR FOOD WOULD RUN OUT BEFORE YOU GOT MONEY TO BUY MORE.: NEVER TRUE

## 2024-03-18 ASSESSMENT — ENCOUNTER SYMPTOMS
BACK PAIN: 1
EYES NEGATIVE: 1
CONSTIPATION: 0
NAUSEA: 0
RESPIRATORY NEGATIVE: 1
ALLERGIC/IMMUNOLOGIC NEGATIVE: 1

## 2024-03-18 ASSESSMENT — PATIENT HEALTH QUESTIONNAIRE - PHQ9
1. LITTLE INTEREST OR PLEASURE IN DOING THINGS: NOT AT ALL
SUM OF ALL RESPONSES TO PHQ9 QUESTIONS 1 & 2: 0
SUM OF ALL RESPONSES TO PHQ QUESTIONS 1-9: 0
2. FEELING DOWN, DEPRESSED OR HOPELESS: NOT AT ALL
SUM OF ALL RESPONSES TO PHQ QUESTIONS 1-9: 0

## 2024-03-18 NOTE — PROGRESS NOTES
3/18/2024     Nasrin Vazquez (:  1955) is a 68 y.o. female, here for evaluation of the following medical concerns:    Diabetes  Pertinent negatives for diabetes include no chest pain and no fatigue.   Hypertension  Associated symptoms include neck pain (improved after surgery). Pertinent negatives include no chest pain.      Scheduled follow up on diabetes, htn, hyperlipidemia.   I have reviewed the patient's medical history in detail and updated the computerized patient record.   She reports not checking bs much.  Bs readings seem higher since her MI.   . Poor control with a1c at 11% on prior check.   She had a month or so of noncompliance at that time.  We restarted the amaryl and metformin and down to 6.9% by may 2020 follow up .  She has had diarrhea , gas, and rare incontinence on the metformin, so she has not been as compliant with meds.  She wasn't sure which drug was causing the issue so she was holding the amaryl as well.   a1c up to 9% in December.    She slacked on diet down in [place], she has been compliant with the medications \"95%\".  Better compliance over the interval. Off metformin due to side effects.  Compliant with amaryl.  No hypoglycemia concerns.     S/p right shoulder replacement, Dr. Crawley in [place], went well.  Feeling good now.  Bilateral cataract removals.  Went well.  Vision improved.      Currently she is off the metformin due to side effects.  Still taking amaryl .  Taking ozempic at 0.5mg /week.  Feels she has lost some wt.     No recurrent chest pain.  No gerd concerns.    No gout or gerd concerns.       Past Medical History:   Diagnosis Date    Acute myocardial infarction (HCC) 12/3/2021    CAD (coronary artery disease) 2021    MI, stents    Cancer (HCC)     Right Breast cancer    Cervical radiculopathy     DDD (degenerative disc disease)     cervical    GERD (gastroesophageal reflux disease)     Hyperlipidemia     Hypertension     Impaired fasting blood

## 2024-03-18 NOTE — PATIENT INSTRUCTIONS
Hospital Outpatient Visit on 03/14/2024   Component Date Value Ref Range Status    Hemoglobin A1C 03/14/2024 6.5 (H)  4.0 - 6.0 % Final    Estimated Avg Glucose 03/14/2024 140  mg/dL Final    Comment: The ADA and AACC recommend providing the estimated average glucose result to permit better   patient understanding of their HBA1c result.      WBC 03/14/2024 7.5  3.5 - 11.3 k/uL Final    RBC 03/14/2024 4.62  3.95 - 5.11 m/uL Final    Hemoglobin 03/14/2024 13.0  11.9 - 15.1 g/dL Final    Hematocrit 03/14/2024 38.8  36.3 - 47.1 % Final    MCV 03/14/2024 84.0  82.6 - 102.9 fL Final    MCH 03/14/2024 28.1  25.2 - 33.5 pg Final    MCHC 03/14/2024 33.5  25.2 - 33.5 g/dL Final    RDW 03/14/2024 14.5 (H)  11.8 - 14.4 % Final    Platelets 03/14/2024 250  138 - 453 k/uL Final    MPV 03/14/2024 9.8  8.1 - 13.5 fL Final    NRBC Automated 03/14/2024 0.0  0.0 per 100 WBC Final    Neutrophils % 03/14/2024 64  36 - 65 % Final    Lymphocytes % 03/14/2024 29  24 - 43 % Final    Monocytes % 03/14/2024 5  3 - 12 % Final    Eosinophils % 03/14/2024 2  1 - 4 % Final    Basophils % 03/14/2024 0  0 - 2 % Final    Immature Granulocytes 03/14/2024 0  0 % Final    Neutrophils Absolute 03/14/2024 4.76  1.50 - 8.10 k/uL Final    Lymphocytes Absolute 03/14/2024 2.18  1.10 - 3.70 k/uL Final    Monocytes Absolute 03/14/2024 0.38  0.10 - 1.20 k/uL Final    Eosinophils Absolute 03/14/2024 0.15  0.00 - 0.44 k/uL Final    Basophils Absolute 03/14/2024 0.03  0.00 - 0.20 k/uL Final    Absolute Immature Granulocyte 03/14/2024 0.03  0.00 - 0.30 k/uL Final    RBC Morphology 03/14/2024 ANISOCYTOSIS PRESENT   Final    Sodium 03/14/2024 140  135 - 144 mmol/L Final    Potassium 03/14/2024 3.9  3.7 - 5.3 mmol/L Final    Chloride 03/14/2024 102  98 - 107 mmol/L Final    CO2 03/14/2024 28  20 - 31 mmol/L Final    Anion Gap 03/14/2024 10  9 - 17 mmol/L Final    Glucose 03/14/2024 163 (H)  70 - 99 mg/dL Final    BUN 03/14/2024 20  8 - 23 mg/dL Final    Creatinine

## 2024-03-19 ENCOUNTER — TELEMEDICINE (OUTPATIENT)
Dept: FAMILY MEDICINE CLINIC | Age: 69
End: 2024-03-19
Payer: MEDICARE

## 2024-03-19 DIAGNOSIS — Z00.00 MEDICARE ANNUAL WELLNESS VISIT, SUBSEQUENT: Primary | ICD-10-CM

## 2024-03-19 PROCEDURE — 3017F COLORECTAL CA SCREEN DOC REV: CPT | Performed by: FAMILY MEDICINE

## 2024-03-19 PROCEDURE — 1123F ACP DISCUSS/DSCN MKR DOCD: CPT | Performed by: FAMILY MEDICINE

## 2024-03-19 PROCEDURE — G0439 PPPS, SUBSEQ VISIT: HCPCS | Performed by: FAMILY MEDICINE

## 2024-03-19 PROCEDURE — G8483 FLU IMM NO ADMIN DOC REA: HCPCS | Performed by: FAMILY MEDICINE

## 2024-03-19 ASSESSMENT — PATIENT HEALTH QUESTIONNAIRE - PHQ9
2. FEELING DOWN, DEPRESSED OR HOPELESS: NOT AT ALL
SUM OF ALL RESPONSES TO PHQ9 QUESTIONS 1 & 2: 0
SUM OF ALL RESPONSES TO PHQ QUESTIONS 1-9: 0
1. LITTLE INTEREST OR PLEASURE IN DOING THINGS: NOT AT ALL

## 2024-03-19 NOTE — PROGRESS NOTES
Medicare Annual Wellness Visit    Nasrin Vazquez is here for Medicare AWV    Assessment & Plan     Recommendations for Preventive Services Due: see orders and patient instructions/AVS.  Recommended screening schedule for the next 5-10 years is provided to the patient in written form: see Patient Instructions/AVS.     No follow-ups on file.     Subjective       Patient's complete Health Risk Assessment and screening values have been reviewed and are found in Flowsheets. The following problems were reviewed today and where indicated follow up appointments were made and/or referrals ordered.    No Positive Risk Factors identified today.                                  Objective      Patient-Reported Vitals  Patient-Reported Height: 5'7\"            No Known Allergies  Prior to Visit Medications    Medication Sig Taking? Authorizing Provider   isosorbide mononitrate (IMDUR) 30 MG extended release tablet TAKE 1 TABLET BY MOUTH DAILY  Roverto Clemons MD   carvedilol (COREG) 25 MG tablet TAKE 1 TABLET BY MOUTH TWICE  DAILY  Roverto Clemons MD   ibandronate (BONIVA) 150 MG tablet 1 TABLET BY MOUTH MONTHLY WITH 8 OZ OF PLAIN WATER 60 MINUTES  BEFORE FIRST FOOD, DRINK OR  MEDS. STAY UPRIGHT FOR 60 MINS  Roverto Clemons MD   BRILINTA 60 MG TABS tablet TAKE 1 TABLET BY MOUTH TWICE  DAILY  Letitia Wetzel, APRN - CNP   atorvastatin (LIPITOR) 40 MG tablet TAKE 1 TABLET BY MOUTH AT NIGHT  Roverto Clemons MD   ezetimibe (ZETIA) 10 MG tablet TAKE 1 TABLET BY MOUTH IN THE  MORNING  Roverto Clemons MD   glimepiride (AMARYL) 2 MG tablet TAKE 1 TABLET BY MOUTH TWICE  DAILY WITH MEALS  Roverto Clemons MD   omeprazole (PRILOSEC) 20 MG delayed release capsule TAKE 1 CAPSULE BY MOUTH DAILY  Roverto Clemons MD   allopurinol (ZYLOPRIM) 300 MG tablet TAKE 1 TABLET BY MOUTH DAILY  Roverto Clemons MD   lisinopril (PRINIVIL;ZESTRIL) 40 MG tablet TAKE 1 TABLET BY MOUTH DAILY  Roverto Clemons MD   benazepril-hydrochlorthiazide (LOTENSIN HCT)

## 2024-04-10 ENCOUNTER — TELEPHONE (OUTPATIENT)
Dept: PAIN MANAGEMENT | Age: 69
End: 2024-04-10

## 2024-04-10 NOTE — TELEPHONE ENCOUNTER
Received a referral from hematology and oncology [facility] for a screening colonoscopy. Referral and progress notes scanned into chart for review. Patient has been scheduled to be seen in the office or can she be a direct schedule for colonoscopy. Wasn't sure due to her medical history.

## 2024-04-15 NOTE — TELEPHONE ENCOUNTER
Left message for patient to return call regarding scheduling colonoscopy and cancelling office appt.

## 2024-04-16 NOTE — TELEPHONE ENCOUNTER
Patient returned call to the office. Patient would like to be seen by a provider closer to her home. She is going to call the office back that sent referral and request this.

## 2024-05-08 ENCOUNTER — TELEPHONE (OUTPATIENT)
Dept: SURGERY | Age: 69
End: 2024-05-08

## 2024-05-15 ENCOUNTER — TELEPHONE (OUTPATIENT)
Dept: SURGERY | Age: 69
End: 2024-05-15

## 2024-05-15 NOTE — TELEPHONE ENCOUNTER
Patient called stating she had sent back paperwork for her colonoscopy back in April for  and has not heard back. Writer seen paperwork was mailed on 5/08/2024 also, for . Patient stated she had requested . Please advise. Can contact patient at # [phone].

## 2024-05-16 ENCOUNTER — TELEPHONE (OUTPATIENT)
Dept: SURGERY | Age: 69
End: 2024-05-16

## 2024-05-16 NOTE — TELEPHONE ENCOUNTER
[facility]         Patient:Nasrin Vazquez           :1955           Surgical/Procedure Planned: Colonoscopy    Date & Location: 24       Outpatient   Planned Length of OR: 30 minutes    Sedation: general        Estimated Cardiac Risk for Non-Cardiac Surgery/Procedure     Low____x__ Moderate______ High______    Medication Instructions - Clarification needed by this date:   -Insulin:    Ozempic will be held for 7 days prior to procedure per Anesthesia protocol    Glimepiride 2 mg BID  Hold __1_ Days    Resume medications:after procedure.      Lovenox indicated: _____Yes __x___NO    Provider:Dr Burden      Signature of Provider Giving Orders for Medication holds:    _____________Electronically signed by Roverto Clemons MD on 2024 at 1:05 PM  ________________________________

## 2024-05-16 NOTE — TELEPHONE ENCOUNTER
[facility]         Patient:Nasrin Vazquez           :1955           Surgical/Procedure Planned: Colonoscopy    Date & Location: 24 @ [facility]       Outpatient   Planned Length of OR: 30 minutes    Sedation: general        Estimated Cardiac Risk for Non-Cardiac Surgery/Procedure     Low______ Moderate______ High______    Medication Instructions - Clarification needed by this date:       ASA 81 mg daily Hold ___ Days  Brilinta 60 mg BID  Hold ___ Days      Resume medications:     Lovenox indicated: _____Yes _____NO    Provider:Dr Burden      Signature of Provider Giving Orders for Medication holds:    _____________________________________________

## 2024-05-16 NOTE — TELEPHONE ENCOUNTER
Instructions reviewed over the phone and mailed to the patient. All questions answered and patient denies any further questions at this time. Patient scheduled for Colonoscopy on 7-16-24 at [facility] with Dr. Burden. Instructed patient she can purchase the Miralax/Gatorade bowel prep over the counter at her pharmacy.     Sent medication hold to Cardiology and PCP  
ONLY N/A 05/11/2018    ANTERIOR C4-5, C5-6 ARTHROPLASTY WITH , SUPINE POSITION, MICROSCOPE, C-ARM, MEDTRONIC(REP NOTIFIED), EVOKES; (ONE PLATE AND FOUR SCREWS REMOVED C6-C7 AND DISCARDED) performed by Shaneka Reynoso DO at [facility] OR    SHOULDER ARTHROPLASTY Left 05/30/2019    SHOULDER ARTHROPLASTY Right 09/2023    [facility]    TUBAL LIGATION Bilateral 1988    UPPER GASTROINTESTINAL ENDOSCOPY  01/12/2010    normal.  Dr. Jerome    WISDOM TOOTH EXTRACTION       Past Medical History:   Diagnosis Date    Acute myocardial infarction (HCC) 12/3/2021    CAD (coronary artery disease) 12/03/2021    MI, stents    Cancer (HCC) 2006    Right Breast cancer    Cervical radiculopathy     DDD (degenerative disc disease)     cervical    GERD (gastroesophageal reflux disease)     Hyperlipidemia     Hypertension     Impaired fasting blood sugar     Kidney stone     Myopia with astigmatism and presbyopia     Obesity     Osteopenia     Renal cell carcinoma (HCC) 06/10/2013    right kidney: clear cell type. Corine grade 2 of 4. 2.5cm limited to kidney    Stenosis of cervical spine with myelopathy (HCC)     Stress incontinence     Type 2 diabetes mellitus without complication (HCC)     Uric acid renal calculus     Wears glasses      Current Outpatient Medications on File Prior to Visit   Medication Sig Dispense Refill    isosorbide mononitrate (IMDUR) 30 MG extended release tablet TAKE 1 TABLET BY MOUTH DAILY 90 tablet 3    carvedilol (COREG) 25 MG tablet TAKE 1 TABLET BY MOUTH TWICE  DAILY 180 tablet 3    ibandronate (BONIVA) 150 MG tablet 1 TABLET BY MOUTH MONTHLY WITH 8 OZ OF PLAIN WATER 60 MINUTES  BEFORE FIRST FOOD, DRINK OR  MEDS. STAY UPRIGHT FOR 60 MINS 3 tablet 3    BRILINTA 60 MG TABS tablet TAKE 1 TABLET BY MOUTH TWICE  DAILY 180 tablet 3    atorvastatin (LIPITOR) 40 MG tablet TAKE 1 TABLET BY MOUTH AT NIGHT 90 tablet 3    ezetimibe (ZETIA) 10 MG tablet TAKE 1 TABLET BY MOUTH IN THE  MORNING 90 tablet 3    glimepiride

## 2024-05-20 ENCOUNTER — OFFICE VISIT (OUTPATIENT)
Dept: CARDIOLOGY | Age: 69
End: 2024-05-20
Payer: MEDICARE

## 2024-05-20 VITALS
SYSTOLIC BLOOD PRESSURE: 136 MMHG | BODY MASS INDEX: 30.29 KG/M2 | WEIGHT: 193 LBS | DIASTOLIC BLOOD PRESSURE: 70 MMHG | HEIGHT: 67 IN | HEART RATE: 74 BPM

## 2024-05-20 DIAGNOSIS — I25.10 ATHEROSCLEROSIS OF NATIVE CORONARY ARTERY OF NATIVE HEART WITHOUT ANGINA PECTORIS: ICD-10-CM

## 2024-05-20 DIAGNOSIS — I25.10 CORONARY ARTERY DISEASE INVOLVING NATIVE CORONARY ARTERY OF NATIVE HEART WITHOUT ANGINA PECTORIS: Primary | ICD-10-CM

## 2024-05-20 DIAGNOSIS — R07.9 CHEST PAIN WITH MODERATE RISK FOR CARDIAC ETIOLOGY: ICD-10-CM

## 2024-05-20 DIAGNOSIS — I10 ESSENTIAL HYPERTENSION: ICD-10-CM

## 2024-05-20 DIAGNOSIS — E11.9 CONTROLLED TYPE 2 DIABETES MELLITUS WITHOUT COMPLICATION, UNSPECIFIED WHETHER LONG TERM INSULIN USE (HCC): ICD-10-CM

## 2024-05-20 DIAGNOSIS — I25.2 OLD MYOCARDIAL INFARCTION: ICD-10-CM

## 2024-05-20 DIAGNOSIS — I10 HYPERTENSION, ESSENTIAL: ICD-10-CM

## 2024-05-20 DIAGNOSIS — E78.2 MIXED HYPERLIPIDEMIA: ICD-10-CM

## 2024-05-20 DIAGNOSIS — I20.9 ANGINA PECTORIS (HCC): ICD-10-CM

## 2024-05-20 DIAGNOSIS — Z98.61 CORONARY ANGIOPLASTY STATUS: ICD-10-CM

## 2024-05-20 PROCEDURE — 1123F ACP DISCUSS/DSCN MKR DOCD: CPT | Performed by: INTERNAL MEDICINE

## 2024-05-20 PROCEDURE — 3044F HG A1C LEVEL LT 7.0%: CPT | Performed by: INTERNAL MEDICINE

## 2024-05-20 PROCEDURE — G8427 DOCREV CUR MEDS BY ELIG CLIN: HCPCS | Performed by: INTERNAL MEDICINE

## 2024-05-20 PROCEDURE — 99214 OFFICE O/P EST MOD 30 MIN: CPT | Performed by: INTERNAL MEDICINE

## 2024-05-20 PROCEDURE — 3017F COLORECTAL CA SCREEN DOC REV: CPT | Performed by: INTERNAL MEDICINE

## 2024-05-20 PROCEDURE — 3078F DIAST BP <80 MM HG: CPT | Performed by: INTERNAL MEDICINE

## 2024-05-20 PROCEDURE — G9708 BILAT MAST/HX BI /UNILAT MAS: HCPCS | Performed by: INTERNAL MEDICINE

## 2024-05-20 PROCEDURE — 2022F DILAT RTA XM EVC RTNOPTHY: CPT | Performed by: INTERNAL MEDICINE

## 2024-05-20 PROCEDURE — G8399 PT W/DXA RESULTS DOCUMENT: HCPCS | Performed by: INTERNAL MEDICINE

## 2024-05-20 PROCEDURE — G8417 CALC BMI ABV UP PARAM F/U: HCPCS | Performed by: INTERNAL MEDICINE

## 2024-05-20 PROCEDURE — 93005 ELECTROCARDIOGRAM TRACING: CPT | Performed by: INTERNAL MEDICINE

## 2024-05-20 PROCEDURE — 93010 ELECTROCARDIOGRAM REPORT: CPT | Performed by: INTERNAL MEDICINE

## 2024-05-20 PROCEDURE — 3075F SYST BP GE 130 - 139MM HG: CPT | Performed by: INTERNAL MEDICINE

## 2024-05-20 PROCEDURE — 1036F TOBACCO NON-USER: CPT | Performed by: INTERNAL MEDICINE

## 2024-05-20 PROCEDURE — 1090F PRES/ABSN URINE INCON ASSESS: CPT | Performed by: INTERNAL MEDICINE

## 2024-05-22 DIAGNOSIS — E11.00 TYPE 2 DIABETES MELLITUS WITH HYPEROSMOLARITY WITHOUT COMA, UNSPECIFIED WHETHER LONG TERM INSULIN USE (HCC): Primary | ICD-10-CM

## 2024-05-22 RX ORDER — SEMAGLUTIDE 1.34 MG/ML
0.5 INJECTION, SOLUTION SUBCUTANEOUS WEEKLY
Qty: 3 ADJUSTABLE DOSE PRE-FILLED PEN SYRINGE | Refills: 3 | Status: SHIPPED | OUTPATIENT
Start: 2024-05-22

## 2024-05-22 NOTE — TELEPHONE ENCOUNTER
Nasrin called requesting a refill of the below medication which has been pended for you:     Requested Prescriptions     Pending Prescriptions Disp Refills    Semaglutide,0.25 or 0.5MG/DOS, (OZEMPIC, 0.25 OR 0.5 MG/DOSE,) 2 MG/1.5ML SOPN 3 Adjustable Dose Pre-filled Pen Syringe 3     Sig: Inject 0.5 mg into the skin once a week       Last Appointment Date: 3/19/2024  Next Appointment Date: 9/18/2024    No Known Allergies

## 2024-05-23 ENCOUNTER — OFFICE VISIT (OUTPATIENT)
Dept: PODIATRY | Age: 69
End: 2024-05-23
Payer: MEDICARE

## 2024-05-23 ENCOUNTER — HOSPITAL ENCOUNTER (OUTPATIENT)
Dept: GENERAL RADIOLOGY | Age: 69
Discharge: HOME | End: 2024-05-25
Payer: MEDICARE

## 2024-05-23 VITALS
WEIGHT: 194.2 LBS | RESPIRATION RATE: 20 BRPM | SYSTOLIC BLOOD PRESSURE: 132 MMHG | DIASTOLIC BLOOD PRESSURE: 70 MMHG | HEART RATE: 72 BPM | BODY MASS INDEX: 30.42 KG/M2

## 2024-05-23 DIAGNOSIS — M79.671 FOOT PAIN, RIGHT: ICD-10-CM

## 2024-05-23 DIAGNOSIS — M19.071 ARTHRITIS OF RIGHT MIDFOOT: ICD-10-CM

## 2024-05-23 DIAGNOSIS — M19.071 ARTHRITIS OF RIGHT MIDFOOT: Primary | ICD-10-CM

## 2024-05-23 PROCEDURE — G8399 PT W/DXA RESULTS DOCUMENT: HCPCS | Performed by: PODIATRIST

## 2024-05-23 PROCEDURE — 1036F TOBACCO NON-USER: CPT | Performed by: PODIATRIST

## 2024-05-23 PROCEDURE — 1090F PRES/ABSN URINE INCON ASSESS: CPT | Performed by: PODIATRIST

## 2024-05-23 PROCEDURE — 3078F DIAST BP <80 MM HG: CPT | Performed by: PODIATRIST

## 2024-05-23 PROCEDURE — 1123F ACP DISCUSS/DSCN MKR DOCD: CPT | Performed by: PODIATRIST

## 2024-05-23 PROCEDURE — 99204 OFFICE O/P NEW MOD 45 MIN: CPT | Performed by: PODIATRIST

## 2024-05-23 PROCEDURE — 99214 OFFICE O/P EST MOD 30 MIN: CPT | Performed by: PODIATRIST

## 2024-05-23 PROCEDURE — 73630 X-RAY EXAM OF FOOT: CPT

## 2024-05-23 PROCEDURE — G9708 BILAT MAST/HX BI /UNILAT MAS: HCPCS | Performed by: PODIATRIST

## 2024-05-23 PROCEDURE — G8417 CALC BMI ABV UP PARAM F/U: HCPCS | Performed by: PODIATRIST

## 2024-05-23 PROCEDURE — 3075F SYST BP GE 130 - 139MM HG: CPT | Performed by: PODIATRIST

## 2024-05-23 PROCEDURE — G8427 DOCREV CUR MEDS BY ELIG CLIN: HCPCS | Performed by: PODIATRIST

## 2024-05-23 PROCEDURE — 3017F COLORECTAL CA SCREEN DOC REV: CPT | Performed by: PODIATRIST

## 2024-05-23 RX ORDER — ASPIRIN 81 MG/1
81 TABLET ORAL DAILY
COMMUNITY

## 2024-05-23 RX ORDER — METHYLPREDNISOLONE 4 MG/1
TABLET ORAL
Qty: 1 KIT | Refills: 0 | Status: SHIPPED | OUTPATIENT
Start: 2024-05-23

## 2024-05-23 NOTE — PROGRESS NOTES
Subjective:  Nasrin Vazquez is a 68 y.o. female who presents to the office today complaining of chronic R foot pain.  Just recently got worse.  Symptoms began  month(s) ago.  Patient relates pain is Present.  Pain is rated 4 out of 10 and is described as waxing and waning, moderate. Certain shoes and activity makes it worse.  Treatments prior to today's visit include: none.  Currently denies F/C/N/V.     No Known Allergies    Past Medical History:   Diagnosis Date    Acute myocardial infarction (HCC) 12/3/2021    CAD (coronary artery disease) 12/03/2021    MI, stents    Cancer (HCC) 2006    Right Breast cancer    Cervical radiculopathy     DDD (degenerative disc disease)     cervical    GERD (gastroesophageal reflux disease)     Hyperlipidemia     Hypertension     Impaired fasting blood sugar     Kidney stone     Myopia with astigmatism and presbyopia     Obesity     Osteopenia     Renal cell carcinoma (HCC) 06/10/2013    right kidney: clear cell type. Corine grade 2 of 4. 2.5cm limited to kidney    Stenosis of cervical spine with myelopathy (HCC)     Stress incontinence     Type 2 diabetes mellitus without complication (HCC)     Uric acid renal calculus     Wears glasses        Prior to Admission medications    Medication Sig Start Date End Date Taking? Authorizing Provider   aspirin 81 MG EC tablet Take 1 tablet by mouth daily   Yes Provider, MD Vini   methylPREDNISolone (MEDROL DOSEPACK) 4 MG tablet Take by mouth. 5/23/24  Yes Raghu Mallory DPM   Semaglutide,0.25 or 0.5MG/DOS, (OZEMPIC, 0.25 OR 0.5 MG/DOSE,) 2 MG/1.5ML SOPN Inject 0.5 mg into the skin once a week 5/22/24  Yes Roverto Clemons MD   ticagrelor (BRILINTA) 90 MG TABS tablet Take 1 tablet by mouth 2 times daily 5/20/24  Yes Tima Kenny DO   isosorbide mononitrate (IMDUR) 30 MG extended release tablet TAKE 1 TABLET BY MOUTH DAILY 1/22/24  Yes Roverto Clemons MD   carvedilol (COREG) 25 MG tablet TAKE 1 TABLET BY MOUTH TWICE  DAILY 1/22/24

## 2024-05-23 NOTE — TELEPHONE ENCOUNTER
Patient was seen by Dr. Kenny on 05/2020 24 and nuclear stress test was ordered, clearance pending results.    Electronically signed by Pietro Schilling MD on 5/23/2024 at 2:11 PM

## 2024-06-04 ENCOUNTER — HOSPITAL ENCOUNTER (OUTPATIENT)
Age: 69
Discharge: HOME | End: 2024-06-06
Attending: INTERNAL MEDICINE
Payer: MEDICARE

## 2024-06-04 ENCOUNTER — HOSPITAL ENCOUNTER (OUTPATIENT)
Dept: NUCLEAR MEDICINE | Age: 69
Discharge: HOME | End: 2024-06-06
Attending: INTERNAL MEDICINE
Payer: MEDICARE

## 2024-06-04 DIAGNOSIS — I10 ESSENTIAL HYPERTENSION: ICD-10-CM

## 2024-06-04 DIAGNOSIS — I10 HYPERTENSION, ESSENTIAL: ICD-10-CM

## 2024-06-04 DIAGNOSIS — I25.10 ATHEROSCLEROSIS OF NATIVE CORONARY ARTERY OF NATIVE HEART WITHOUT ANGINA PECTORIS: ICD-10-CM

## 2024-06-04 DIAGNOSIS — E11.9 CONTROLLED TYPE 2 DIABETES MELLITUS WITHOUT COMPLICATION, UNSPECIFIED WHETHER LONG TERM INSULIN USE (HCC): ICD-10-CM

## 2024-06-04 DIAGNOSIS — E78.2 MIXED HYPERLIPIDEMIA: ICD-10-CM

## 2024-06-04 DIAGNOSIS — Z98.61 CORONARY ANGIOPLASTY STATUS: ICD-10-CM

## 2024-06-04 DIAGNOSIS — I25.10 CORONARY ARTERY DISEASE INVOLVING NATIVE CORONARY ARTERY OF NATIVE HEART WITHOUT ANGINA PECTORIS: ICD-10-CM

## 2024-06-04 DIAGNOSIS — I20.9 ANGINA PECTORIS (HCC): ICD-10-CM

## 2024-06-04 DIAGNOSIS — R07.9 CHEST PAIN WITH MODERATE RISK FOR CARDIAC ETIOLOGY: ICD-10-CM

## 2024-06-04 DIAGNOSIS — I25.2 OLD MYOCARDIAL INFARCTION: ICD-10-CM

## 2024-06-04 LAB
NUC STRESS EJECTION FRACTION: 61 %
STRESS BASELINE DIAS BP: 76 MMHG
STRESS BASELINE HR: 69 BPM
STRESS BASELINE SYS BP: 134 MMHG
STRESS ESTIMATED WORKLOAD: 1 METS
STRESS PEAK DIAS BP: 71 MMHG
STRESS PEAK SYS BP: 139 MMHG
STRESS PERCENT HR ACHIEVED: 72 %
STRESS POST PEAK HR: 110 BPM
STRESS RATE PRESSURE PRODUCT: NORMAL BPM*MMHG
STRESS ST DEPRESSION: 0 MM
STRESS TARGET HR: 152 BPM
TID: 1.19

## 2024-06-04 PROCEDURE — 78452 HT MUSCLE IMAGE SPECT MULT: CPT

## 2024-06-04 PROCEDURE — 93017 CV STRESS TEST TRACING ONLY: CPT

## 2024-06-04 PROCEDURE — A9500 TC99M SESTAMIBI: HCPCS | Performed by: INTERNAL MEDICINE

## 2024-06-04 PROCEDURE — 3430000000 HC RX DIAGNOSTIC RADIOPHARMACEUTICAL: Performed by: INTERNAL MEDICINE

## 2024-06-04 PROCEDURE — 6360000002 HC RX W HCPCS: Performed by: INTERNAL MEDICINE

## 2024-06-04 RX ORDER — TETRAKIS(2-METHOXYISOBUTYLISOCYANIDE)COPPER(I) TETRAFLUOROBORATE 1 MG/ML
30 INJECTION, POWDER, LYOPHILIZED, FOR SOLUTION INTRAVENOUS
Status: COMPLETED | OUTPATIENT
Start: 2024-06-04 | End: 2024-06-04

## 2024-06-04 RX ORDER — REGADENOSON 0.08 MG/ML
0.4 INJECTION, SOLUTION INTRAVENOUS ONCE
Status: COMPLETED | OUTPATIENT
Start: 2024-06-04 | End: 2024-06-04

## 2024-06-04 RX ORDER — TETRAKIS(2-METHOXYISOBUTYLISOCYANIDE)COPPER(I) TETRAFLUOROBORATE 1 MG/ML
10 INJECTION, POWDER, LYOPHILIZED, FOR SOLUTION INTRAVENOUS
Status: COMPLETED | OUTPATIENT
Start: 2024-06-04 | End: 2024-06-04

## 2024-06-04 RX ADMIN — Medication 30 MILLICURIE: at 09:30

## 2024-06-04 RX ADMIN — Medication 10 MILLICURIE: at 08:30

## 2024-06-04 RX ADMIN — REGADENOSON 0.4 MG: 0.08 INJECTION, SOLUTION INTRAVENOUS at 09:31

## 2024-06-05 NOTE — TELEPHONE ENCOUNTER
Patient notified of stress test results and of clearance. Pt verbalized understanding and had no further questions at the time

## 2024-06-05 NOTE — TELEPHONE ENCOUNTER
Cardiology Consultation  [facility]  [address] [place] [address] [phone]      06/05/24       Regarding:  Nasrin OBREGON George  1955      To Whom It May Concern,      Patient is Intermediate Cardiac Risk for planned surgery.      Special instructions:  Patient is taking Brilinta/ASA and can be held for 5 days prior to procedure and resumed as soon as surgical bleeding risk has resolved.     Please continue other meds.        Thank you,      Tima Kenny DO, FACC, RPVI, BARBIE, TANO  Cardiology  [facility]    Electronically signed by Tima Kenny DO

## 2024-06-05 NOTE — TELEPHONE ENCOUNTER
Stress Interpretation Summary         Stress Combined Conclusion: The study is negative for myocardial ischemia. Findings suggest a low risk of cardiac events.    Stress Function: Left ventricular function post-stress is normal. Post-stress ejection fraction is 61%.    Perfusion Comments: Prone images were obtained. LV perfusion is normal. There is no evidence of inducible ischemia.    Perfusion Conclusion: TID ratio is 1.19.    ECG: The stress ECG was negative for ischemia.    Stress Test: A pharmacological stress test was performed using regadenoson (Lexiscan). PO caffeine given as a reversal agent.

## 2024-06-06 NOTE — TELEPHONE ENCOUNTER
Placed this information on pre op instructions. Mailed out bowel prep and pre op instructions to patient

## 2024-06-17 RX ORDER — HYDROCHLOROTHIAZIDE 12.5 MG/1
CAPSULE, GELATIN COATED ORAL
Qty: 90 CAPSULE | Refills: 3 | Status: SHIPPED | OUTPATIENT
Start: 2024-06-17

## 2024-07-12 NOTE — H&P
06/30/2010     cervical 6-7 microdiscectomy. Dr. Foster    PARTIAL NEPHRECTOMY Right 06/10/2013     limited to kidney    [place] OFFICE/OUTPT VISIT,PROCEDURE ONLY N/A 05/11/2018     ANTERIOR C4-5, C5-6 ARTHROPLASTY WITH , SUPINE POSITION, MICROSCOPE, C-ARM, MEDTRONIC(REP NOTIFIED), EVOKES; (ONE PLATE AND FOUR SCREWS REMOVED C6-C7 AND DISCARDED) performed by Shaneka Reynoso DO at [facility] OR    SHOULDER ARTHROPLASTY Left 05/30/2019    SHOULDER ARTHROPLASTY Right 09/2023     [facility]    TUBAL LIGATION Bilateral 1988    UPPER GASTROINTESTINAL ENDOSCOPY   01/12/2010     normal.  Dr. Jerome    WISDOM TOOTH EXTRACTION             Past Medical History        Past Medical History:   Diagnosis Date    Acute myocardial infarction (HCC) 12/3/2021    CAD (coronary artery disease) 12/03/2021     MI, stents    Cancer (HCC) 2006     Right Breast cancer    Cervical radiculopathy      DDD (degenerative disc disease)       cervical    GERD (gastroesophageal reflux disease)      Hyperlipidemia      Hypertension      Impaired fasting blood sugar      Kidney stone      Myopia with astigmatism and presbyopia      Obesity      Osteopenia      Renal cell carcinoma (HCC) 06/10/2013     right kidney: clear cell type. Corine grade 2 of 4. 2.5cm limited to kidney    Stenosis of cervical spine with myelopathy (HCC)      Stress incontinence      Type 2 diabetes mellitus without complication (HCC)      Uric acid renal calculus      Wears glasses                  Current Outpatient Medications on File Prior to Visit   Medication Sig Dispense Refill    isosorbide mononitrate (IMDUR) 30 MG extended release tablet TAKE 1 TABLET BY MOUTH DAILY 90 tablet 3    carvedilol (COREG) 25 MG tablet TAKE 1 TABLET BY MOUTH TWICE  DAILY 180 tablet 3    ibandronate (BONIVA) 150 MG tablet 1 TABLET BY MOUTH MONTHLY WITH 8 OZ OF PLAIN WATER 60 MINUTES  BEFORE FIRST FOOD, DRINK OR  MEDS. STAY UPRIGHT FOR 60 MINS 3 tablet 3    BRILINTA 60 MG TABS tablet TAKE 1 TABLET BY

## 2024-07-16 ENCOUNTER — ANESTHESIA (OUTPATIENT)
Dept: OPERATING ROOM | Age: 69
End: 2024-07-16
Payer: MEDICARE

## 2024-07-16 ENCOUNTER — HOSPITAL ENCOUNTER (OUTPATIENT)
Age: 69
Setting detail: OUTPATIENT SURGERY
Discharge: HOME | End: 2024-07-16
Attending: SURGERY | Admitting: SURGERY
Payer: MEDICARE

## 2024-07-16 ENCOUNTER — ANESTHESIA EVENT (OUTPATIENT)
Dept: OPERATING ROOM | Age: 69
End: 2024-07-16
Payer: MEDICARE

## 2024-07-16 VITALS
WEIGHT: 191 LBS | OXYGEN SATURATION: 94 % | TEMPERATURE: 98.2 F | HEIGHT: 67 IN | HEART RATE: 73 BPM | BODY MASS INDEX: 29.98 KG/M2 | RESPIRATION RATE: 16 BRPM | SYSTOLIC BLOOD PRESSURE: 112 MMHG | DIASTOLIC BLOOD PRESSURE: 67 MMHG

## 2024-07-16 DIAGNOSIS — Z80.0 FAMILY HISTORY OF COLON CANCER: ICD-10-CM

## 2024-07-16 PROCEDURE — 2709999900 HC NON-CHARGEABLE SUPPLY: Performed by: SURGERY

## 2024-07-16 PROCEDURE — 2580000003 HC RX 258: Performed by: SURGERY

## 2024-07-16 PROCEDURE — 7100000010 HC PHASE II RECOVERY - FIRST 15 MIN: Performed by: SURGERY

## 2024-07-16 PROCEDURE — 3609010600 HC COLONOSCOPY POLYPECTOMY SNARE/COLD BIOPSY: Performed by: SURGERY

## 2024-07-16 PROCEDURE — 3700000001 HC ADD 15 MINUTES (ANESTHESIA): Performed by: SURGERY

## 2024-07-16 PROCEDURE — 2500000003 HC RX 250 WO HCPCS: Performed by: NURSE ANESTHETIST, CERTIFIED REGISTERED

## 2024-07-16 PROCEDURE — 7100000011 HC PHASE II RECOVERY - ADDTL 15 MIN: Performed by: SURGERY

## 2024-07-16 PROCEDURE — 45380 COLONOSCOPY AND BIOPSY: CPT | Performed by: SURGERY

## 2024-07-16 PROCEDURE — 88305 TISSUE EXAM BY PATHOLOGIST: CPT

## 2024-07-16 PROCEDURE — 3700000000 HC ANESTHESIA ATTENDED CARE: Performed by: SURGERY

## 2024-07-16 PROCEDURE — 6360000002 HC RX W HCPCS: Performed by: NURSE ANESTHETIST, CERTIFIED REGISTERED

## 2024-07-16 RX ORDER — SODIUM CHLORIDE, SODIUM LACTATE, POTASSIUM CHLORIDE, CALCIUM CHLORIDE 600; 310; 30; 20 MG/100ML; MG/100ML; MG/100ML; MG/100ML
INJECTION, SOLUTION INTRAVENOUS CONTINUOUS
Status: DISCONTINUED | OUTPATIENT
Start: 2024-07-16 | End: 2024-07-16 | Stop reason: HOSPADM

## 2024-07-16 RX ORDER — PROPOFOL 10 MG/ML
INJECTION, EMULSION INTRAVENOUS PRN
Status: DISCONTINUED | OUTPATIENT
Start: 2024-07-16 | End: 2024-07-16 | Stop reason: SDUPTHER

## 2024-07-16 RX ORDER — LIDOCAINE HYDROCHLORIDE 40 MG/ML
INJECTION, SOLUTION RETROBULBAR PRN
Status: DISCONTINUED | OUTPATIENT
Start: 2024-07-16 | End: 2024-07-16 | Stop reason: SDUPTHER

## 2024-07-16 RX ADMIN — LIDOCAINE HYDROCHLORIDE 40 MG: 40 INJECTION, SOLUTION RETROBULBAR; TOPICAL at 11:45

## 2024-07-16 RX ADMIN — PROPOFOL 180 MCG/KG/MIN: 10 INJECTION, EMULSION INTRAVENOUS at 11:46

## 2024-07-16 RX ADMIN — PROPOFOL 100 MG: 10 INJECTION, EMULSION INTRAVENOUS at 11:45

## 2024-07-16 RX ADMIN — SODIUM CHLORIDE, POTASSIUM CHLORIDE, SODIUM LACTATE AND CALCIUM CHLORIDE: 600; 310; 30; 20 INJECTION, SOLUTION INTRAVENOUS at 11:06

## 2024-07-16 ASSESSMENT — PAIN - FUNCTIONAL ASSESSMENT
PAIN_FUNCTIONAL_ASSESSMENT: ADULT NONVERBAL PAIN SCALE (NPVS)
PAIN_FUNCTIONAL_ASSESSMENT: NONE - DENIES PAIN
PAIN_FUNCTIONAL_ASSESSMENT: 0-10
PAIN_FUNCTIONAL_ASSESSMENT: NONE - DENIES PAIN

## 2024-07-16 NOTE — OP NOTE
COLONOSCOPY PROCEDURE NOTE:      Pre op diagnosis:     1. AGE 68  2. FH of colon cancer - mother in 50s  3. Personal h/o breast and kidney ca  4. No prior polyps  5. Last CS 5- with Dr. Jerome - diverticulosis    Operative Procedure:    1.  Colonoscopy with cold bx    Surgeon:    Dr. Tk Burden     Anesthesia:    IV sedation per CRNA    EBL:  minimal    Procedure:    Patient was taken to the endoscopy suite and placed in a left lateral decubitus position.  They were given IV sedation as mentioned above.  A digital rectal exam was performed.  There were no masses and anal tone was normal.  The colonoscope was inserted into the rectum and carefully manipulated up through the sigmoid colon, transverse colon, right colon and into the cecum.  The cecum was identified by the ileal cecal valve and transabdominal illumination.  Then the scope was slowly withdrawn.  The scope was retroflexed in the rectum and the dentate line was examined.  The scope was removed.  The patient tolerated the procedure well.  The following findings were noted.        Final Diagnosis:    Irritation right colon , bx pnd  5 mm polyps at 10 and 3 cm, removed with cold forceps  Moderate sigmoid diverticulosis    Plan:    Await bx  Repeat CS in 5 years due to family hx colon cancer and hx of polyps

## 2024-07-16 NOTE — DISCHARGE INSTRUCTIONS
Await bx  Repeat CS in 5 years due to family hx colon cancer and hx of polyps      DISCHARGE INSTRUCTIONS FOLLOWING COLONOSCOPY    Activity  You have received sedation.  Your judgement and coordination may be impaired.  Do not drive or operate any machinery today.  Do not make personal, medical, legal or business decisions today.  Do not consume alcohol, tranquilizers or sleeping medications today unless otherwise instructed by your physician.  Rest today.  You may resume normal activity tomorrow.    Because air is put into your colon during this procedure, it is normal to pass large amounts of air from your rectum.  Feelings of bloating, gas or cramping may persist for 24 hours.  You may not have a bowel movement for 1-3 days after this procedure.    Diet  Begin with sips of clear liquids and if no nausea, you may progress to your normal diet.    Medications  You may resume your usual medications, unless otherwise instructed.    Follow-Up  As instructed.    CALL YOUR PHYSICIAN if you experience any of the following:  Bleeding from your rectum (a teaspoonful or more)      - If you had a biopsy taken today, a small amount of bleeding may occur.  Severe abdominal pain/cramping and/or distention that is not relieved by passing gas.  Persistent nausea or vomiting.  Signs of infection including fever, chills, redness or swelling @ the IV site.      If you have questions or problems call [phone] ([facility]) or after business hours call [phone] ([facility])

## 2024-07-16 NOTE — ANESTHESIA PRE PROCEDURE
Department of Anesthesiology  Preprocedure Note       Name:  Nasrin Vazquez   Age:  68 y.o.  :  1955                                          MRN:  [id number]         Date:  2024      Surgeon: Surgeon(s):  Tk Burden MD    Procedure: Procedure(s):  ANTERIOR C4-5, C5-6 ARTHROPLASTY WITH POSSIBLE FUSION, SUPINE POSITION, MICROSCOPE, C-ARM, MEDTRONIC(REP NOTIFIED), EVOKES-    Medications prior to admission:   Prior to Admission medications    Medication Sig Start Date End Date Taking? Authorizing Provider   hydroCHLOROthiazide 12.5 MG capsule TAKE 1 CAPSULE BY MOUTH IN THE  MORNING 24   Roverto Clemons MD   Semaglutide,0.25 or 0.5MG/DOS, (OZEMPIC, 0.25 OR 0.5 MG/DOSE,) 2 MG/1.5ML SOPN Inject 0.5 mg into the skin once a week 24   Roverto Clemons MD   ticagrelor (BRILINTA) 90 MG TABS tablet Take 1 tablet by mouth 2 times daily 24   Tima Kenny,    isosorbide mononitrate (IMDUR) 30 MG extended release tablet TAKE 1 TABLET BY MOUTH DAILY 24   Roverto Clemons MD   carvedilol (COREG) 25 MG tablet TAKE 1 TABLET BY MOUTH TWICE  DAILY 24   Roverto Clemons MD   ibandronate (BONIVA) 150 MG tablet 1 TABLET BY MOUTH MONTHLY WITH 8 OZ OF PLAIN WATER 60 MINUTES  BEFORE FIRST FOOD, DRINK OR  MEDS. STAY UPRIGHT FOR 60 MINS 24   Roverto Clemons MD   atorvastatin (LIPITOR) 40 MG tablet TAKE 1 TABLET BY MOUTH AT NIGHT 24   Roverto Clemons MD   ezetimibe (ZETIA) 10 MG tablet TAKE 1 TABLET BY MOUTH IN THE  MORNING 24   Roverto Clemons MD   glimepiride (AMARYL) 2 MG tablet TAKE 1 TABLET BY MOUTH TWICE  DAILY WITH MEALS 24   Roverto Clemons MD   omeprazole (PRILOSEC) 20 MG delayed release capsule TAKE 1 CAPSULE BY MOUTH DAILY 24   Roverto Clemons MD   allopurinol (ZYLOPRIM) 300 MG tablet TAKE 1 TABLET BY MOUTH DAILY 24   Roverto Clemons MD   lisinopril (PRINIVIL;ZESTRIL) 40 MG tablet TAKE 1 TABLET BY MOUTH DAILY 24   Roverto Clemons MD   furosemide (LASIX) 20 MG tablet Take 1

## 2024-07-16 NOTE — ANESTHESIA POSTPROCEDURE EVALUATION
Department of Anesthesiology  Postprocedure Note    Patient: Nasrin Vazquez  MRN: [id number]  YOB: 1955  Date of evaluation: 7/16/2024    Procedure Summary       Date: 07/16/24 Room / Location: [facility]    Anesthesia Start: 1139 Anesthesia Stop: 1209    Procedure: COLONOSCOPY POLYPECTOMY BIOPSY (Anus) Diagnosis:       Family history of colon cancer      (Family history of colon cancer [Z80.0])    Surgeons: Tk Burden MD Responsible Provider: Kyrie Adams II, APRN - CRNA    Anesthesia Type: General, TIVA ASA Status: 3            Anesthesia Type: General, TIVA    Tomas Phase I: Tomas Score: 10    Tomas Phase II: Tomas Score: 10    Anesthesia Post Evaluation    Patient location during evaluation: bedside  Patient participation: complete - patient participated  Level of consciousness: awake  Pain score: 0  Airway patency: patent  Nausea & Vomiting: no nausea and no vomiting  Cardiovascular status: hemodynamically stable  Respiratory status: spontaneous ventilation  Hydration status: stable  Pain management: satisfactory to patient    No notable events documented.

## 2024-07-18 LAB — SURGICAL PATHOLOGY REPORT: NORMAL

## 2024-07-30 ENCOUNTER — TELEPHONE (OUTPATIENT)
Dept: SURGERY | Age: 69
End: 2024-07-30

## 2024-07-30 NOTE — TELEPHONE ENCOUNTER
Letter created and mailed to patient with results from recent CS at [facility] with Dr. Burden on 7/16/2024. Updated history, health maintenance, and recall. Forwarded results to PCP.

## 2024-10-02 ENCOUNTER — HOSPITAL ENCOUNTER (OUTPATIENT)
Age: 69
Discharge: HOME | End: 2024-10-02
Payer: MEDICARE

## 2024-10-02 DIAGNOSIS — E78.2 MIXED HYPERLIPIDEMIA: ICD-10-CM

## 2024-10-02 DIAGNOSIS — E11.00 TYPE 2 DIABETES MELLITUS WITH HYPEROSMOLARITY WITHOUT COMA, UNSPECIFIED WHETHER LONG TERM INSULIN USE (HCC): ICD-10-CM

## 2024-10-02 DIAGNOSIS — I10 PRIMARY HYPERTENSION: ICD-10-CM

## 2024-10-02 LAB
ALBUMIN SERPL-MCNC: 4 G/DL (ref 3.5–5.2)
ALBUMIN/GLOB SERPL: 1.4 {RATIO} (ref 1–2.5)
ALP SERPL-CCNC: 121 U/L (ref 35–104)
ALT SERPL-CCNC: 15 U/L (ref 5–33)
ANION GAP SERPL CALCULATED.3IONS-SCNC: 12 MMOL/L (ref 9–17)
AST SERPL-CCNC: 14 U/L
BASOPHILS # BLD: 0.04 K/UL (ref 0–0.2)
BASOPHILS NFR BLD: 0 % (ref 0–2)
BILIRUB SERPL-MCNC: 0.6 MG/DL (ref 0.3–1.2)
BUN SERPL-MCNC: 21 MG/DL (ref 8–23)
BUN/CREAT SERPL: 23 (ref 9–20)
CALCIUM SERPL-MCNC: 9.4 MG/DL (ref 8.6–10.4)
CHLORIDE SERPL-SCNC: 102 MMOL/L (ref 98–107)
CHOLEST SERPL-MCNC: 119 MG/DL (ref 0–199)
CHOLESTEROL/HDL RATIO: 3
CO2 SERPL-SCNC: 26 MMOL/L (ref 20–31)
CREAT SERPL-MCNC: 0.9 MG/DL (ref 0.5–0.9)
EOSINOPHIL # BLD: 0.18 K/UL (ref 0–0.44)
EOSINOPHILS RELATIVE PERCENT: 2 % (ref 1–4)
ERYTHROCYTE [DISTWIDTH] IN BLOOD BY AUTOMATED COUNT: 13.6 % (ref 11.8–14.4)
EST. AVERAGE GLUCOSE BLD GHB EST-MCNC: 154 MG/DL
GFR, ESTIMATED: 70 ML/MIN/1.73M2
GLUCOSE SERPL-MCNC: 212 MG/DL (ref 70–99)
HBA1C MFR BLD: 7 % (ref 4–6)
HCT VFR BLD AUTO: 37.5 % (ref 36.3–47.1)
HDLC SERPL-MCNC: 45 MG/DL
HGB BLD-MCNC: 12.9 G/DL (ref 11.9–15.1)
IMM GRANULOCYTES # BLD AUTO: 0.06 K/UL (ref 0–0.3)
IMM GRANULOCYTES NFR BLD: 1 %
LDLC SERPL CALC-MCNC: 56 MG/DL (ref 0–100)
LYMPHOCYTES NFR BLD: 1.84 K/UL (ref 1.1–3.7)
LYMPHOCYTES RELATIVE PERCENT: 20 % (ref 24–43)
MCH RBC QN AUTO: 29 PG (ref 25.2–33.5)
MCHC RBC AUTO-ENTMCNC: 34.4 G/DL (ref 25.2–33.5)
MCV RBC AUTO: 84.3 FL (ref 82.6–102.9)
MONOCYTES NFR BLD: 0.48 K/UL (ref 0.1–1.2)
MONOCYTES NFR BLD: 5 % (ref 3–12)
NEUTROPHILS NFR BLD: 72 % (ref 36–65)
NEUTS SEG NFR BLD: 6.52 K/UL (ref 1.5–8.1)
NRBC BLD-RTO: 0 PER 100 WBC
PLATELET # BLD AUTO: 246 K/UL (ref 138–453)
PMV BLD AUTO: 10.2 FL (ref 8.1–13.5)
POTASSIUM SERPL-SCNC: 3.9 MMOL/L (ref 3.7–5.3)
PROT SERPL-MCNC: 6.9 G/DL (ref 6.4–8.3)
RBC # BLD AUTO: 4.45 M/UL (ref 3.95–5.11)
SODIUM SERPL-SCNC: 140 MMOL/L (ref 135–144)
TRIGL SERPL-MCNC: 91 MG/DL
VLDLC SERPL CALC-MCNC: 18 MG/DL
WBC OTHER # BLD: 9.1 K/UL (ref 3.5–11.3)

## 2024-10-02 PROCEDURE — 83036 HEMOGLOBIN GLYCOSYLATED A1C: CPT

## 2024-10-02 PROCEDURE — 36415 COLL VENOUS BLD VENIPUNCTURE: CPT

## 2024-10-02 PROCEDURE — 80053 COMPREHEN METABOLIC PANEL: CPT

## 2024-10-02 PROCEDURE — 80061 LIPID PANEL: CPT

## 2024-10-02 PROCEDURE — 85025 COMPLETE CBC W/AUTO DIFF WBC: CPT

## 2024-10-08 ENCOUNTER — OFFICE VISIT (OUTPATIENT)
Dept: FAMILY MEDICINE CLINIC | Age: 69
End: 2024-10-08
Payer: MEDICARE

## 2024-10-08 VITALS
DIASTOLIC BLOOD PRESSURE: 72 MMHG | BODY MASS INDEX: 30.89 KG/M2 | HEIGHT: 67 IN | OXYGEN SATURATION: 98 % | WEIGHT: 196.8 LBS | SYSTOLIC BLOOD PRESSURE: 132 MMHG | HEART RATE: 74 BPM

## 2024-10-08 DIAGNOSIS — K21.9 GASTROESOPHAGEAL REFLUX DISEASE WITHOUT ESOPHAGITIS: ICD-10-CM

## 2024-10-08 DIAGNOSIS — I10 PRIMARY HYPERTENSION: Primary | ICD-10-CM

## 2024-10-08 DIAGNOSIS — C64.1 RENAL CELL CARCINOMA OF RIGHT KIDNEY (HCC): ICD-10-CM

## 2024-10-08 DIAGNOSIS — E78.2 MIXED HYPERLIPIDEMIA: ICD-10-CM

## 2024-10-08 DIAGNOSIS — N20.0 KIDNEY STONE: ICD-10-CM

## 2024-10-08 DIAGNOSIS — I25.10 CORONARY ARTERY DISEASE INVOLVING NATIVE CORONARY ARTERY OF NATIVE HEART WITHOUT ANGINA PECTORIS: ICD-10-CM

## 2024-10-08 DIAGNOSIS — E11.00 TYPE 2 DIABETES MELLITUS WITH HYPEROSMOLARITY WITHOUT COMA, UNSPECIFIED WHETHER LONG TERM INSULIN USE (HCC): ICD-10-CM

## 2024-10-08 PROCEDURE — 2022F DILAT RTA XM EVC RTNOPTHY: CPT | Performed by: FAMILY MEDICINE

## 2024-10-08 PROCEDURE — 99214 OFFICE O/P EST MOD 30 MIN: CPT | Performed by: FAMILY MEDICINE

## 2024-10-08 PROCEDURE — G9708 BILAT MAST/HX BI /UNILAT MAS: HCPCS | Performed by: FAMILY MEDICINE

## 2024-10-08 PROCEDURE — 1036F TOBACCO NON-USER: CPT | Performed by: FAMILY MEDICINE

## 2024-10-08 PROCEDURE — 3017F COLORECTAL CA SCREEN DOC REV: CPT | Performed by: FAMILY MEDICINE

## 2024-10-08 PROCEDURE — 99213 OFFICE O/P EST LOW 20 MIN: CPT | Performed by: FAMILY MEDICINE

## 2024-10-08 PROCEDURE — 3051F HG A1C>EQUAL 7.0%<8.0%: CPT | Performed by: FAMILY MEDICINE

## 2024-10-08 PROCEDURE — 1090F PRES/ABSN URINE INCON ASSESS: CPT | Performed by: FAMILY MEDICINE

## 2024-10-08 PROCEDURE — 1123F ACP DISCUSS/DSCN MKR DOCD: CPT | Performed by: FAMILY MEDICINE

## 2024-10-08 PROCEDURE — G8483 FLU IMM NO ADMIN DOC REA: HCPCS | Performed by: FAMILY MEDICINE

## 2024-10-08 PROCEDURE — G8417 CALC BMI ABV UP PARAM F/U: HCPCS | Performed by: FAMILY MEDICINE

## 2024-10-08 PROCEDURE — 3078F DIAST BP <80 MM HG: CPT | Performed by: FAMILY MEDICINE

## 2024-10-08 PROCEDURE — G8399 PT W/DXA RESULTS DOCUMENT: HCPCS | Performed by: FAMILY MEDICINE

## 2024-10-08 PROCEDURE — G8427 DOCREV CUR MEDS BY ELIG CLIN: HCPCS | Performed by: FAMILY MEDICINE

## 2024-10-08 PROCEDURE — 3075F SYST BP GE 130 - 139MM HG: CPT | Performed by: FAMILY MEDICINE

## 2024-10-08 RX ORDER — SEMAGLUTIDE 1.34 MG/ML
1 INJECTION, SOLUTION SUBCUTANEOUS WEEKLY
Qty: 3 ML | Refills: 0 | Status: SHIPPED | OUTPATIENT
Start: 2024-10-08

## 2024-10-08 ASSESSMENT — ENCOUNTER SYMPTOMS
CONSTIPATION: 0
RESPIRATORY NEGATIVE: 1
NAUSEA: 0
ALLERGIC/IMMUNOLOGIC NEGATIVE: 1
BACK PAIN: 1
EYES NEGATIVE: 1

## 2024-10-08 NOTE — PROGRESS NOTES
10/8/2024     Nasrin Vazquez (:  1955) is a 68 y.o. female, here for evaluation of the following medical concerns:    Diabetes  Pertinent negatives for diabetes include no chest pain and no fatigue.   Hypertension  Associated symptoms include neck pain (improved after surgery). Pertinent negatives include no chest pain.      Scheduled follow up on diabetes, htn, hyperlipidemia.   I have reviewed the patient's medical history in detail and updated the computerized patient record.   She reports not checking bs much.  Bs readings seem higher since her MI.   . Poor control with a1c at 11% on prior check.   She had a month or so of noncompliance at that time.  We restarted the amaryl and metformin and down to 6.9% by may 2020 follow up .  She has had diarrhea , gas, and rare incontinence on the metformin, so she has not been as compliant with meds.  She wasn't sure which drug was causing the issue so she was holding the amaryl as well.   a1c up to 9% in December.    She slacked on diet down in [place], she has been compliant with the medications \"95%\".  Better compliance over the interval. Off metformin due to side effects.  Compliant with amaryl.  No hypoglycemia concerns.     S/p right shoulder replacement, Dr. Crawley in [place], went well.  Feeling good now.  Bilateral cataract removals.  Went well.  Vision improved.      Currently she is off the metformin due to side effects.  Still taking amaryl .  Taking ozempic at 0.5mg /week.  Feels she has lost some wt.  She has not titrated the dose up.     No recurrent chest pain.  No gerd concerns.    No gout or gerd concerns.       Past Medical History:   Diagnosis Date    Acute myocardial infarction (HCC) 12/3/2021    CAD (coronary artery disease) 2021    MI, stents    Cancer (HCC)     Right Breast cancer    Cervical radiculopathy     DDD (degenerative disc disease)     cervical    GERD (gastroesophageal reflux disease)     Hyperlipidemia

## 2024-10-08 NOTE — PATIENT INSTRUCTIONS
Glom Filt Rate 10/02/2024 70  >60 mL/min/1.73m2 Final    Comment:       These results are not intended for use in patients <18 years of age.        eGFR results are calculated without a race factor using the 2021 CKD-EPI equation.  Careful clinical correlation is recommended, particularly when comparing to results   calculated using previous equations.  The CKD-EPI equation is less accurate in patients with extremes of muscle mass, extra-renal   metabolism of creatine, excessive creatine ingestion, or following therapy that affects   renal tubular secretion.      BUN/Creatinine Ratio 10/02/2024 23 (H)  9 - 20 Final    Calcium 10/02/2024 9.4  8.6 - 10.4 mg/dL Final    Total Protein 10/02/2024 6.9  6.4 - 8.3 g/dL Final    Albumin 10/02/2024 4.0  3.5 - 5.2 g/dL Final    Albumin/Globulin Ratio 10/02/2024 1.4  1.0 - 2.5 Final    Total Bilirubin 10/02/2024 0.6  0.3 - 1.2 mg/dL Final    Alkaline Phosphatase 10/02/2024 121 (H)  35 - 104 U/L Final    ALT 10/02/2024 15  5 - 33 U/L Final    AST 10/02/2024 14  <32 U/L Final    Cholesterol, Total 10/02/2024 119  0 - 199 mg/dL Final    Comment:    Cholesterol Guidelines:      <200  Desirable   200-240  Borderline      >240  Undesirable         HDL 10/02/2024 45  >40 mg/dL Final    Comment:    HDL Guidelines:    <40     Undesirable   40-59    Borderline    >59     Desirable         LDL Cholesterol 10/02/2024 56  0 - 100 mg/dL Final    Comment:    LDL Guidelines:     <100    Desirable   100-129   Near to/above Desirable   130-159   Borderline      >159   Undesirable     Direct (measured) LDL and calculated LDL are not interchangeable tests.      Chol/HDL Ratio 10/02/2024 3.0   Final    Triglycerides 10/02/2024 91  <150 mg/dL Final    Comment:    Triglyceride Guidelines:     <150   Desirable   150-199  Borderline   200-499  High     >499   Very high   Based on AHA Guidelines for fasting triglyceride, October 2012.         VLDL 10/02/2024 18  mg/dL Final

## 2024-11-07 DIAGNOSIS — E11.00 TYPE 2 DIABETES MELLITUS WITH HYPEROSMOLARITY WITHOUT COMA, UNSPECIFIED WHETHER LONG TERM INSULIN USE (HCC): ICD-10-CM

## 2024-11-07 RX ORDER — SEMAGLUTIDE 1.34 MG/ML
0.5 INJECTION, SOLUTION SUBCUTANEOUS WEEKLY
Qty: 3 ADJUSTABLE DOSE PRE-FILLED PEN SYRINGE | Refills: 3 | Status: CANCELLED | OUTPATIENT
Start: 2024-11-07

## 2024-11-07 RX ORDER — SEMAGLUTIDE 1.34 MG/ML
1 INJECTION, SOLUTION SUBCUTANEOUS WEEKLY
Qty: 9 ML | Refills: 0 | Status: SHIPPED | OUTPATIENT
Start: 2024-11-07

## 2024-11-07 NOTE — TELEPHONE ENCOUNTER
Nasrin called requesting a refill of the below medication which has been pended for you:     Requested Prescriptions     Pending Prescriptions Disp Refills    Semaglutide, 1 MG/DOSE, (OZEMPIC, 1 MG/DOSE,) 2 MG/1.5ML SOPN 9 mL 0     Sig: Inject 1 mg into the skin once a week       Last Appointment Date: 10/8/2024  Next Appointment Date: 4/8/2025    No Known Allergies  
Pt requesting a 90 day supply  
Shelley Castañeda)

## 2024-11-18 ENCOUNTER — OFFICE VISIT (OUTPATIENT)
Dept: CARDIOLOGY | Age: 69
End: 2024-11-18
Payer: MEDICARE

## 2024-11-18 VITALS
WEIGHT: 191 LBS | HEIGHT: 67 IN | DIASTOLIC BLOOD PRESSURE: 58 MMHG | BODY MASS INDEX: 29.98 KG/M2 | HEART RATE: 82 BPM | SYSTOLIC BLOOD PRESSURE: 110 MMHG

## 2024-11-18 DIAGNOSIS — R01.1 HEART MURMUR: ICD-10-CM

## 2024-11-18 DIAGNOSIS — I25.10 CORONARY ARTERY DISEASE INVOLVING NATIVE CORONARY ARTERY OF NATIVE HEART WITHOUT ANGINA PECTORIS: Primary | ICD-10-CM

## 2024-11-18 DIAGNOSIS — R07.9 CHEST PAIN WITH MODERATE RISK FOR CARDIAC ETIOLOGY: ICD-10-CM

## 2024-11-18 DIAGNOSIS — I25.84 CORONARY ARTERY DISEASE DUE TO CALCIFIED CORONARY LESION: ICD-10-CM

## 2024-11-18 DIAGNOSIS — I10 HYPERTENSION, ESSENTIAL: ICD-10-CM

## 2024-11-18 DIAGNOSIS — R06.02 SHORTNESS OF BREATH: ICD-10-CM

## 2024-11-18 DIAGNOSIS — I25.10 CORONARY ARTERY DISEASE DUE TO CALCIFIED CORONARY LESION: ICD-10-CM

## 2024-11-18 DIAGNOSIS — R07.9 CHEST PAIN, UNSPECIFIED TYPE: ICD-10-CM

## 2024-11-18 DIAGNOSIS — E78.5 HYPERLIPIDEMIA LDL GOAL <70: ICD-10-CM

## 2024-11-18 PROCEDURE — G8399 PT W/DXA RESULTS DOCUMENT: HCPCS | Performed by: NURSE PRACTITIONER

## 2024-11-18 PROCEDURE — 3078F DIAST BP <80 MM HG: CPT | Performed by: NURSE PRACTITIONER

## 2024-11-18 PROCEDURE — 3017F COLORECTAL CA SCREEN DOC REV: CPT | Performed by: NURSE PRACTITIONER

## 2024-11-18 PROCEDURE — G8417 CALC BMI ABV UP PARAM F/U: HCPCS | Performed by: NURSE PRACTITIONER

## 2024-11-18 PROCEDURE — 93010 ELECTROCARDIOGRAM REPORT: CPT | Performed by: NURSE PRACTITIONER

## 2024-11-18 PROCEDURE — G8427 DOCREV CUR MEDS BY ELIG CLIN: HCPCS | Performed by: NURSE PRACTITIONER

## 2024-11-18 PROCEDURE — 99214 OFFICE O/P EST MOD 30 MIN: CPT | Performed by: NURSE PRACTITIONER

## 2024-11-18 PROCEDURE — 93005 ELECTROCARDIOGRAM TRACING: CPT | Performed by: NURSE PRACTITIONER

## 2024-11-18 PROCEDURE — 1090F PRES/ABSN URINE INCON ASSESS: CPT | Performed by: NURSE PRACTITIONER

## 2024-11-18 PROCEDURE — 3074F SYST BP LT 130 MM HG: CPT | Performed by: NURSE PRACTITIONER

## 2024-11-18 PROCEDURE — G8483 FLU IMM NO ADMIN DOC REA: HCPCS | Performed by: NURSE PRACTITIONER

## 2024-11-18 PROCEDURE — 1159F MED LIST DOCD IN RCRD: CPT | Performed by: NURSE PRACTITIONER

## 2024-11-18 PROCEDURE — G9708 BILAT MAST/HX BI /UNILAT MAS: HCPCS | Performed by: NURSE PRACTITIONER

## 2024-11-18 PROCEDURE — 1123F ACP DISCUSS/DSCN MKR DOCD: CPT | Performed by: NURSE PRACTITIONER

## 2024-11-18 PROCEDURE — 1036F TOBACCO NON-USER: CPT | Performed by: NURSE PRACTITIONER

## 2024-11-18 NOTE — PROGRESS NOTES
[facility]  Progress Note            Cardiology Consultation/Follow Up.  [facility]    Nasrin Vazquez  1955  [id number]    Today: 11/18/24    CC: Patient is here for follow up for CAD s/p PCI.     HPI:   Nasrin Vazquez is here for follow up for CAD s/p PCI.   Stages last week and for about the prior 3 weeks she had some left sided pain, right at breast, lasting for about 5 min at a time. Went away on its own. No associated dizziness, syncope, SOB/BATISTA at that time. She does have BATISTA which resolves with rest but at times can take a while. She also notices this lying down in bed  No le edema.   No palpitations.   No syncope.     Past Medical:  Past Medical History:   Diagnosis Date    Acute myocardial infarction (HCC) 12/3/2021    CAD (coronary artery disease) 12/03/2021    MI, stents    Cancer (HCC) 2006    Right Breast cancer    Cervical radiculopathy     DDD (degenerative disc disease)     cervical    GERD (gastroesophageal reflux disease)     Hyperlipidemia     Hypertension     Impaired fasting blood sugar     Kidney stone     Myopia with astigmatism and presbyopia     Obesity     Osteopenia     Renal cell carcinoma (HCC) 06/10/2013    right kidney: clear cell type. Corine grade 2 of 4. 2.5cm limited to kidney    Stenosis of cervical spine with myelopathy (HCC)     Stress incontinence     Type 2 diabetes mellitus without complication (HCC)     Uric acid renal calculus     Wears glasses        Past Surgical:  Past Surgical History:   Procedure Laterality Date    BLADDER SUSPENSION  2011    incontinence    BREAST BIOPSY Right 11/20/2007    wire localization     CATARACT REMOVAL Right 11/08/2023    [facility] Dr. Wills    CERVICAL SPINE SURGERY  05/11/2018     ANTERIOR C4-5, C5-6 ARTHROPLASTY, (ONE PLATE AND FOUR SCREWS REMOVED C6-C7 AND DISCARDED    COLONOSCOPY  05/19/2008    COLONOSCOPY  05/03/2017    [id number]    COLONOSCOPY N/A 07/16/2024    moderate sigmoid

## 2024-11-21 ENCOUNTER — HOSPITAL ENCOUNTER (OUTPATIENT)
Age: 69
Discharge: HOME | End: 2024-11-23
Payer: MEDICARE

## 2024-11-21 VITALS — BODY MASS INDEX: 29.41 KG/M2 | HEIGHT: 67 IN | WEIGHT: 187.39 LBS

## 2024-11-21 DIAGNOSIS — R06.02 SHORTNESS OF BREATH: ICD-10-CM

## 2024-11-21 DIAGNOSIS — R01.1 HEART MURMUR: ICD-10-CM

## 2024-11-21 DIAGNOSIS — R07.9 CHEST PAIN, UNSPECIFIED TYPE: ICD-10-CM

## 2024-11-21 LAB
ECHO AO ROOT DIAM: 2.7 CM
ECHO AO ROOT INDEX: 1.37 CM/M2
ECHO AV AREA PEAK VELOCITY: 1.5 CM2
ECHO AV AREA/BSA PEAK VELOCITY: 0.8 CM2/M2
ECHO AV PEAK GRADIENT: 9 MMHG
ECHO AV PEAK VELOCITY: 1.5 M/S
ECHO AV VELOCITY RATIO: 0.53
ECHO BSA: 2 M2
ECHO EST RA PRESSURE: 3 MMHG
ECHO LA AREA 2C: 17.2 CM2
ECHO LA AREA 4C: 18.3 CM2
ECHO LA DIAMETER INDEX: 2.03 CM/M2
ECHO LA DIAMETER: 4 CM
ECHO LA MAJOR AXIS: 5.3 CM
ECHO LA MINOR AXIS: 5.8 CM
ECHO LA TO AORTIC ROOT RATIO: 1.48
ECHO LA VOL BP: 48 ML (ref 22–52)
ECHO LA VOL MOD A2C: 42 ML (ref 22–52)
ECHO LA VOL MOD A4C: 51 ML (ref 22–52)
ECHO LA VOL/BSA BIPLANE: 24 ML/M2 (ref 16–34)
ECHO LA VOLUME INDEX MOD A2C: 21 ML/M2 (ref 16–34)
ECHO LA VOLUME INDEX MOD A4C: 26 ML/M2 (ref 16–34)
ECHO LV E' LATERAL VELOCITY: 6.64 CM/S
ECHO LV E' SEPTAL VELOCITY: 6.2 CM/S
ECHO LV EF PHYSICIAN: 65 %
ECHO LV FRACTIONAL SHORTENING: 32 % (ref 28–44)
ECHO LV INTERNAL DIMENSION DIASTOLE INDEX: 2.23 CM/M2
ECHO LV INTERNAL DIMENSION DIASTOLIC: 4.4 CM (ref 3.9–5.3)
ECHO LV INTERNAL DIMENSION SYSTOLIC INDEX: 1.52 CM/M2
ECHO LV INTERNAL DIMENSION SYSTOLIC: 3 CM
ECHO LV IVSD: 1.1 CM (ref 0.6–0.9)
ECHO LV MASS 2D: 180 G (ref 67–162)
ECHO LV MASS INDEX 2D: 91.4 G/M2 (ref 43–95)
ECHO LV POSTERIOR WALL DIASTOLIC: 1.2 CM (ref 0.6–0.9)
ECHO LV RELATIVE WALL THICKNESS RATIO: 0.55
ECHO LVOT AREA: 2.8 CM2
ECHO LVOT DIAM: 1.9 CM
ECHO LVOT MEAN GRADIENT: 1 MMHG
ECHO LVOT PEAK GRADIENT: 2 MMHG
ECHO LVOT PEAK VELOCITY: 0.8 M/S
ECHO LVOT STROKE VOLUME INDEX: 25 ML/M2
ECHO LVOT SV: 49.3 ML
ECHO LVOT VTI: 17.4 CM
ECHO MV A VELOCITY: 0.96 M/S
ECHO MV E DECELERATION TIME (DT): 176 MS
ECHO MV E VELOCITY: 0.66 M/S
ECHO MV E/A RATIO: 0.69
ECHO MV E/E' LATERAL: 9.94
ECHO MV E/E' RATIO (AVERAGED): 10.29
ECHO MV E/E' SEPTAL: 10.65
ECHO RIGHT VENTRICULAR SYSTOLIC PRESSURE (RVSP): 12 MMHG
ECHO RV TAPSE: 1.7 CM (ref 1.7–?)
ECHO TV REGURGITANT MAX VELOCITY: 1.53 M/S
ECHO TV REGURGITANT PEAK GRADIENT: 9 MMHG

## 2024-11-21 PROCEDURE — 93306 TTE W/DOPPLER COMPLETE: CPT

## 2024-11-22 ENCOUNTER — TELEPHONE (OUTPATIENT)
Dept: CARDIOLOGY | Age: 69
End: 2024-11-22

## 2024-11-22 NOTE — TELEPHONE ENCOUNTER
Left Ventricle: Normal left ventricular systolic function. EF by visual approximation is 65%. Left ventricle size is normal. Normal wall thickness. Normal wall motion. Normal diastolic function.    Mitral Valve: Mild annular calcification.    Image quality is adequate.

## 2024-11-22 NOTE — TELEPHONE ENCOUNTER
Pt notified of echo results.  She states understanding and denies symptoms or complaints.  Confirmed routine follow up.

## 2025-01-08 DIAGNOSIS — E11.00 TYPE 2 DIABETES MELLITUS WITH HYPEROSMOLARITY WITHOUT COMA, UNSPECIFIED WHETHER LONG TERM INSULIN USE (HCC): ICD-10-CM

## 2025-01-08 RX ORDER — SEMAGLUTIDE 1.34 MG/ML
INJECTION, SOLUTION SUBCUTANEOUS
Qty: 9 ML | Refills: 3 | Status: SHIPPED | OUTPATIENT
Start: 2025-01-08

## 2025-01-08 NOTE — TELEPHONE ENCOUNTER
Nasrin called requesting a refill of the below medication which has been pended for you:     Requested Prescriptions     Pending Prescriptions Disp Refills    OZEMPIC, 1 MG/DOSE, 4 MG/3ML SOPN sc injection [Pharmacy Med Name: Ozempic (1 MG/DOSE) 4 MG/3ML Subcutaneous Solution Pen-injector] 9 mL 3     Sig: INJECT SUBCUTANEOUSLY 1 MG EVERY WEEK       Last Appointment Date: 10/8/2024  Next Appointment Date: 4/8/2025    No Known Allergies

## 2025-01-13 DIAGNOSIS — E78.2 MIXED HYPERLIPIDEMIA: ICD-10-CM

## 2025-01-13 DIAGNOSIS — I21.3 ST ELEVATION MYOCARDIAL INFARCTION (STEMI), UNSPECIFIED ARTERY (HCC): ICD-10-CM

## 2025-01-13 DIAGNOSIS — I15.8 OTHER SECONDARY HYPERTENSION: ICD-10-CM

## 2025-01-13 RX ORDER — EZETIMIBE 10 MG/1
TABLET ORAL
Qty: 90 TABLET | Refills: 3 | Status: SHIPPED | OUTPATIENT
Start: 2025-01-13

## 2025-01-13 RX ORDER — ISOSORBIDE MONONITRATE 30 MG/1
TABLET, EXTENDED RELEASE ORAL
Qty: 90 TABLET | Refills: 3 | Status: SHIPPED | OUTPATIENT
Start: 2025-01-13

## 2025-01-13 RX ORDER — CARVEDILOL 25 MG/1
25 TABLET ORAL 2 TIMES DAILY
Qty: 180 TABLET | Refills: 3 | Status: SHIPPED | OUTPATIENT
Start: 2025-01-13

## 2025-01-13 RX ORDER — LISINOPRIL 40 MG/1
40 TABLET ORAL DAILY
Qty: 90 TABLET | Refills: 3 | Status: SHIPPED | OUTPATIENT
Start: 2025-01-13

## 2025-01-13 RX ORDER — ATORVASTATIN CALCIUM 40 MG/1
40 TABLET, FILM COATED ORAL NIGHTLY
Qty: 90 TABLET | Refills: 3 | Status: SHIPPED | OUTPATIENT
Start: 2025-01-13

## 2025-01-13 RX ORDER — GLIMEPIRIDE 2 MG/1
TABLET ORAL
Qty: 180 TABLET | Refills: 3 | Status: SHIPPED | OUTPATIENT
Start: 2025-01-13

## 2025-01-13 RX ORDER — ALLOPURINOL 300 MG/1
300 TABLET ORAL DAILY
Qty: 90 TABLET | Refills: 3 | Status: SHIPPED | OUTPATIENT
Start: 2025-01-13

## 2025-01-27 ENCOUNTER — APPOINTMENT (OUTPATIENT)
Dept: CT IMAGING | Age: 70
End: 2025-01-27
Payer: MEDICARE

## 2025-01-27 ENCOUNTER — HOSPITAL ENCOUNTER (EMERGENCY)
Age: 70
Discharge: HOME | End: 2025-01-27
Attending: EMERGENCY MEDICINE
Payer: MEDICARE

## 2025-01-27 ENCOUNTER — APPOINTMENT (OUTPATIENT)
Dept: GENERAL RADIOLOGY | Age: 70
End: 2025-01-27
Payer: MEDICARE

## 2025-01-27 VITALS
RESPIRATION RATE: 16 BRPM | SYSTOLIC BLOOD PRESSURE: 134 MMHG | DIASTOLIC BLOOD PRESSURE: 77 MMHG | HEART RATE: 72 BPM | OXYGEN SATURATION: 100 % | TEMPERATURE: 98.8 F

## 2025-01-27 DIAGNOSIS — S40.011A CONTUSION OF RIGHT SHOULDER, INITIAL ENCOUNTER: ICD-10-CM

## 2025-01-27 DIAGNOSIS — S62.111A CLOSED CHIP FRACTURE OF TRIQUETRUM OF RIGHT WRIST, INITIAL ENCOUNTER: ICD-10-CM

## 2025-01-27 DIAGNOSIS — S01.81XA FACIAL LACERATION, INITIAL ENCOUNTER: Primary | ICD-10-CM

## 2025-01-27 DIAGNOSIS — S09.90XA CLOSED HEAD INJURY, INITIAL ENCOUNTER: ICD-10-CM

## 2025-01-27 PROCEDURE — 99284 EMERGENCY DEPT VISIT MOD MDM: CPT

## 2025-01-27 PROCEDURE — 73030 X-RAY EXAM OF SHOULDER: CPT

## 2025-01-27 PROCEDURE — 70450 CT HEAD/BRAIN W/O DYE: CPT

## 2025-01-27 PROCEDURE — 72125 CT NECK SPINE W/O DYE: CPT

## 2025-01-27 PROCEDURE — 6360000002 HC RX W HCPCS: Performed by: EMERGENCY MEDICINE

## 2025-01-27 PROCEDURE — 12011 RPR F/E/E/N/L/M 2.5 CM/<: CPT

## 2025-01-27 PROCEDURE — 71045 X-RAY EXAM CHEST 1 VIEW: CPT

## 2025-01-27 PROCEDURE — 73110 X-RAY EXAM OF WRIST: CPT

## 2025-01-27 PROCEDURE — 6370000000 HC RX 637 (ALT 250 FOR IP): Performed by: EMERGENCY MEDICINE

## 2025-01-27 RX ORDER — LIDOCAINE HYDROCHLORIDE 10 MG/ML
5 INJECTION, SOLUTION INFILTRATION; PERINEURAL ONCE
Status: COMPLETED | OUTPATIENT
Start: 2025-01-27 | End: 2025-01-27

## 2025-01-27 RX ORDER — BACITRACIN ZINC 500 [USP'U]/G
OINTMENT TOPICAL ONCE
Status: COMPLETED | OUTPATIENT
Start: 2025-01-27 | End: 2025-01-27

## 2025-01-27 RX ADMIN — BACITRACIN ZINC: 500 OINTMENT TOPICAL at 16:48

## 2025-01-27 RX ADMIN — LIDOCAINE HYDROCHLORIDE 5 ML: 10 INJECTION, SOLUTION INFILTRATION; PERINEURAL at 11:51

## 2025-01-27 ASSESSMENT — ENCOUNTER SYMPTOMS
DIARRHEA: 0
CONSTIPATION: 0
SHORTNESS OF BREATH: 0
BACK PAIN: 0
VOMITING: 0
EYE PAIN: 0
BLOOD IN STOOL: 0
COUGH: 0
NAUSEA: 0
ABDOMINAL PAIN: 0

## 2025-01-27 ASSESSMENT — PAIN DESCRIPTION - ORIENTATION: ORIENTATION: RIGHT

## 2025-01-27 ASSESSMENT — PAIN DESCRIPTION - PAIN TYPE: TYPE: ACUTE PAIN

## 2025-01-27 ASSESSMENT — PAIN DESCRIPTION - LOCATION: LOCATION: FACE

## 2025-01-27 ASSESSMENT — PAIN SCALES - GENERAL: PAINLEVEL_OUTOF10: 3

## 2025-01-27 ASSESSMENT — PAIN DESCRIPTION - ONSET: ONSET: GRADUAL

## 2025-01-27 ASSESSMENT — LIFESTYLE VARIABLES: HOW OFTEN DO YOU HAVE A DRINK CONTAINING ALCOHOL: NEVER

## 2025-01-27 ASSESSMENT — PAIN - FUNCTIONAL ASSESSMENT: PAIN_FUNCTIONAL_ASSESSMENT: ACTIVITIES ARE NOT PREVENTED

## 2025-01-27 ASSESSMENT — PAIN DESCRIPTION - DESCRIPTORS: DESCRIPTORS: ACHING

## 2025-01-27 NOTE — ED PROVIDER NOTES
[facility]  eMERGENCY dEPARTMENT eNCOUnter      Pt Name: Nasrin Vazquez  MRN: [id number]  Birthdate 1955  Date of evaluation: 1/27/2025      CHIEF COMPLAINT       Chief Complaint   Patient presents with    Laceration     Above right eyebrow    Head Injury     Hit head when fell         HISTORY OF PRESENT ILLNESS    Nasrin Vazquez is a 69 y.o. female who presents with a head injury she slipped on the ice going to the [employer] in [place] to pay her taxes she struck her head there is no loss of consciousness but she suffered a laceration in the right eyebrow and had persistent bleeding she is on Brilinta after having stents placed for coronary artery disease she denies any chest or belly pain that she was able to get up on her own but could not get the bleeding stopped comes in by squad she says she started to get a bit of a right shoulder ache she has had both of her shoulders replaced but she is able to move them without problems there is no hip knee or ankle pain bilaterally there was no loss of consciousness      REVIEW OF SYSTEMS         Review of Systems   Constitutional:  Negative for chills and fever.   HENT:  Negative for congestion and ear pain.         Facial laceration   Eyes:  Negative for pain and visual disturbance.   Respiratory:  Negative for cough and shortness of breath.    Cardiovascular:  Negative for chest pain, palpitations and leg swelling.   Gastrointestinal:  Negative for abdominal pain, blood in stool, constipation, diarrhea, nausea and vomiting.   Endocrine: Negative for polydipsia and polyuria.   Genitourinary:  Negative for difficulty urinating, dysuria, frequency, vaginal bleeding and vaginal discharge.   Musculoskeletal:  Negative for back pain, joint swelling, myalgias, neck pain and neck stiffness.        Right shoulder pain after fall   Skin:  Negative for rash.   Neurological:  Positive for headaches. Negative for dizziness and weakness.   Hematological:  Negative

## 2025-01-27 NOTE — ED NOTES
Suture site above right eyebrow is no longer bleeding. Writer cleaned pt's face, neck, hair, and chest of dried blood. Pt able to ambulate steadily and stand at the sink to wash her hands. Pt's earrings and necklaces were removed and placed in a specimen cup labeled with her name. Pt given an ice pack to apply to her face/eye/suture site.  at bedside. Call light in reach.

## 2025-01-28 ENCOUNTER — CARE COORDINATION (OUTPATIENT)
Dept: CARE COORDINATION | Age: 70
End: 2025-01-28

## 2025-01-28 NOTE — CARE COORDINATION
Ambulatory Care Coordination Note     1/28/2025 1:16 PM     Patient Current Location:  Home: [address] [place] [address]     This patient was received as a referral from Population health report .    Patient contacted the ACM by telephone. Provided introduction to self, and explanation of the ACM role.   Patient declined care management services at this time.          ACM: Diana Brock, RN     Care Summary Note:     Nasrin was seen at [facility] ED 1/27/2025.     1/28/2025- Ana returned call. She caw ortho- Dr. Tyler this am. He did not feel she had a wrist fx. She has a sleeve to wear when outdoors.   She will see PCP as scheduled.   She denied any redness, drainage from incision.   She denied any further concerns.   She is aware that she can reach if needed.     PCP/Specialist follow up:   Future Appointments         Provider Specialty Dept Phone    2/3/2025 9:40 AM Roverto Clemons MD Family Medicine [phone]    4/8/2025 8:00 AM Roverto Clemons MD Family Medicine [phone]    4/16/2025 9:00 AM MDCX PF, AWV NURSE SCHEDULE Family Medicine [phone]    6/16/2025 8:45 AM Tima Kenny DO Cardiology [phone]            Follow Up:   No further Ambulatory Care Management follow-up scheduled at this time.  Patient  has Ambulatory Care Manager's contact information for any further questions, concerns or needs.

## 2025-01-28 NOTE — CARE COORDINATION
Ambulatory Care Coordination Note     1/28/2025 10:18 AM     Patient outreach attempt by this ACM today to offer care management services. ACM was unable to reach the patient by telephone today;   left voice message requesting a return phone call to this ACM.     ACM: Diana Brock, RN     Care Summary Note:     Nasrin was seen at [facility] ED 1/27/2025.     1/28/2025- Left HIPAA compliant voice message requesting return call @ [phone].      PCP/Specialist follow up:   Future Appointments         Provider Specialty Dept Phone    2/3/2025 9:40 AM Roverto Clemons MD Family Medicine [phone]    4/8/2025 8:00 AM Roverto Clemons MD Family Medicine [phone]    4/16/2025 9:00 AM MAINE RUIZ NURSE SCHEDULE Family Medicine [phone]    6/16/2025 8:45 AM Tima Kenny DO Cardiology [phone]            Follow Up:   Plan for next ACM outreach in approximately 1-2 days  to complete:  - outreach attempt to offer care management services  ED F/U call .

## 2025-02-03 ENCOUNTER — OFFICE VISIT (OUTPATIENT)
Dept: FAMILY MEDICINE CLINIC | Age: 70
End: 2025-02-03

## 2025-02-03 VITALS
SYSTOLIC BLOOD PRESSURE: 128 MMHG | DIASTOLIC BLOOD PRESSURE: 80 MMHG | HEART RATE: 71 BPM | BODY MASS INDEX: 30.79 KG/M2 | HEIGHT: 66 IN | WEIGHT: 191.6 LBS | OXYGEN SATURATION: 99 %

## 2025-02-03 DIAGNOSIS — Z48.02 ENCOUNTER FOR REMOVAL OF SUTURES: Primary | ICD-10-CM

## 2025-02-03 ASSESSMENT — ENCOUNTER SYMPTOMS
RESPIRATORY NEGATIVE: 1
GASTROINTESTINAL NEGATIVE: 1
EYES NEGATIVE: 1

## 2025-02-03 NOTE — PROGRESS NOTES
Subjective:      Patient ID: Nasrin Vazquez is a 69 y.o. female.    HPI  acute visit for suture removal.  She was outdoors on a side walk, foot hit the grass and she fell.  Laceration over the right eyebrow, sore right shoulder and wrist.  Saw ortho for the wrist.  No fracture.  Brace briefly .  No residual right wrist or shoulder pain.      Past Medical History:   Diagnosis Date    Acute myocardial infarction (HCC) 12/3/2021    CAD (coronary artery disease) 12/03/2021    MI, stents    Cancer (HCC) 2006    Right Breast cancer    Cervical radiculopathy     DDD (degenerative disc disease)     cervical    GERD (gastroesophageal reflux disease)     Hyperlipidemia     Hypertension     Impaired fasting blood sugar     Kidney stone     Myopia with astigmatism and presbyopia     Obesity     Osteopenia     Renal cell carcinoma (HCC) 06/10/2013    right kidney: clear cell type. Corine grade 2 of 4. 2.5cm limited to kidney    Stenosis of cervical spine with myelopathy (HCC)     Stress incontinence     Type 2 diabetes mellitus without complication (HCC)     Uric acid renal calculus     Wears glasses      Past Surgical History:   Procedure Laterality Date    BLADDER SUSPENSION  2011    incontinence    BREAST BIOPSY Right 11/20/2007    wire localization     CATARACT REMOVAL Right 11/08/2023    [facility] Dr. Wills    CERVICAL SPINE SURGERY  05/11/2018     ANTERIOR C4-5, C5-6 ARTHROPLASTY, (ONE PLATE AND FOUR SCREWS REMOVED C6-C7 AND DISCARDED    COLONOSCOPY  05/19/2008    COLONOSCOPY  05/03/2017    [id number]    COLONOSCOPY N/A 07/16/2024    moderate sigmoid diverticulosis, hyperplastic polyp x2; Dr. Burden at [facility]    CORONARY ANGIOPLASTY  12/03/2021    CORONARY ANGIOPLASTY  12/21/2021    CORONARY ANGIOPLASTY WITH STENT PLACEMENT  12/03/2021    CYSTOURETHROSCOPY Bilateral 09/02/2021    ENDOSCOPY, COLON, DIAGNOSTIC      HYSTERECTOMY, TOTAL ABDOMINAL (CERVIX REMOVED)  2011    with bladder lift    MASTECTOMY Bilateral

## 2025-03-05 RX ORDER — IBANDRONATE SODIUM 150 MG/1
TABLET, FILM COATED ORAL
Qty: 3 TABLET | Refills: 3 | Status: SHIPPED | OUTPATIENT
Start: 2025-03-05

## 2025-03-05 RX ORDER — TICAGRELOR 90 MG/1
90 TABLET ORAL 2 TIMES DAILY
Qty: 180 TABLET | Refills: 3 | Status: SHIPPED | OUTPATIENT
Start: 2025-03-05

## 2025-03-05 NOTE — TELEPHONE ENCOUNTER
Nasrin called requesting a refill of the below medication which has been pended for you:     Requested Prescriptions     Pending Prescriptions Disp Refills    ibandronate (BONIVA) 150 MG tablet [Pharmacy Med Name: IBANDRONATE  150MG  TAB] 3 tablet 3     Sig: TAKE 1 TABLET BY MOUTH MONTHLY  WITH 8 OUNCE OF PLAIN WATER 1  HOUR BEFORE FIRST FOOD DRINK OR  MEDS. STAY UPRIGHT FOR 60  MINUTES       Last Appointment Date: 2/3/2025  Next Appointment Date: 4/8/2025    No Known Allergies

## 2025-04-03 ENCOUNTER — HOSPITAL ENCOUNTER (OUTPATIENT)
Age: 70
Discharge: HOME | End: 2025-04-03
Payer: MEDICARE

## 2025-04-03 DIAGNOSIS — I10 PRIMARY HYPERTENSION: ICD-10-CM

## 2025-04-03 DIAGNOSIS — E78.2 MIXED HYPERLIPIDEMIA: ICD-10-CM

## 2025-04-03 DIAGNOSIS — E11.00 TYPE 2 DIABETES MELLITUS WITH HYPEROSMOLARITY WITHOUT COMA, UNSPECIFIED WHETHER LONG TERM INSULIN USE (HCC): ICD-10-CM

## 2025-04-03 LAB
ALBUMIN SERPL-MCNC: 4.3 G/DL (ref 3.5–5.2)
ALBUMIN/GLOB SERPL: 1.7 {RATIO} (ref 1–2.5)
ALP SERPL-CCNC: 122 U/L (ref 35–104)
ALT SERPL-CCNC: 20 U/L (ref 10–35)
ANION GAP SERPL CALCULATED.3IONS-SCNC: 10 MMOL/L (ref 9–16)
AST SERPL-CCNC: 22 U/L (ref 10–35)
BASOPHILS # BLD: 0.04 K/UL (ref 0–0.2)
BASOPHILS NFR BLD: 1 % (ref 0–2)
BILIRUB SERPL-MCNC: 0.3 MG/DL (ref 0–1.2)
BUN SERPL-MCNC: 17 MG/DL (ref 8–23)
BUN/CREAT SERPL: 17 (ref 9–20)
CALCIUM SERPL-MCNC: 9.5 MG/DL (ref 8.6–10.4)
CHLORIDE SERPL-SCNC: 103 MMOL/L (ref 98–107)
CHOLEST SERPL-MCNC: 113 MG/DL (ref 0–199)
CHOLESTEROL/HDL RATIO: 2.5
CO2 SERPL-SCNC: 28 MMOL/L (ref 20–31)
CREAT SERPL-MCNC: 1 MG/DL (ref 0.6–0.9)
EOSINOPHIL # BLD: 0.17 K/UL (ref 0–0.44)
EOSINOPHILS RELATIVE PERCENT: 2 % (ref 1–4)
ERYTHROCYTE [DISTWIDTH] IN BLOOD BY AUTOMATED COUNT: 14.2 % (ref 11.8–14.4)
EST. AVERAGE GLUCOSE BLD GHB EST-MCNC: 140 MG/DL
GFR, ESTIMATED: 61 ML/MIN/1.73M2
GLUCOSE SERPL-MCNC: 135 MG/DL (ref 74–99)
HBA1C MFR BLD: 6.5 % (ref 4–6)
HCT VFR BLD AUTO: 39.3 % (ref 36.3–47.1)
HDLC SERPL-MCNC: 45 MG/DL
HGB BLD-MCNC: 13.3 G/DL (ref 11.9–15.1)
IMM GRANULOCYTES # BLD AUTO: 0.05 K/UL (ref 0–0.3)
IMM GRANULOCYTES NFR BLD: 1 %
LDLC SERPL CALC-MCNC: 49 MG/DL (ref 0–100)
LYMPHOCYTES NFR BLD: 2.71 K/UL (ref 1.1–3.7)
LYMPHOCYTES RELATIVE PERCENT: 31 % (ref 24–43)
MCH RBC QN AUTO: 28.4 PG (ref 25.2–33.5)
MCHC RBC AUTO-ENTMCNC: 33.8 G/DL (ref 25.2–33.5)
MCV RBC AUTO: 84 FL (ref 82.6–102.9)
MONOCYTES NFR BLD: 0.5 K/UL (ref 0.1–1.2)
MONOCYTES NFR BLD: 6 % (ref 3–12)
NEUTROPHILS NFR BLD: 59 % (ref 36–65)
NEUTS SEG NFR BLD: 5.27 K/UL (ref 1.5–8.1)
NRBC BLD-RTO: 0 PER 100 WBC
PLATELET # BLD AUTO: 275 K/UL (ref 138–453)
PMV BLD AUTO: 9.8 FL (ref 8.1–13.5)
POTASSIUM SERPL-SCNC: 4 MMOL/L (ref 3.7–5.3)
PROT SERPL-MCNC: 6.9 G/DL (ref 6.6–8.7)
RBC # BLD AUTO: 4.68 M/UL (ref 3.95–5.11)
SODIUM SERPL-SCNC: 141 MMOL/L (ref 136–145)
TRIGL SERPL-MCNC: 93 MG/DL
VLDLC SERPL CALC-MCNC: 19 MG/DL (ref 1–30)
WBC OTHER # BLD: 8.7 K/UL (ref 3.5–11.3)

## 2025-04-03 PROCEDURE — 80061 LIPID PANEL: CPT

## 2025-04-03 PROCEDURE — 83036 HEMOGLOBIN GLYCOSYLATED A1C: CPT

## 2025-04-03 PROCEDURE — 85025 COMPLETE CBC W/AUTO DIFF WBC: CPT

## 2025-04-03 PROCEDURE — 36415 COLL VENOUS BLD VENIPUNCTURE: CPT

## 2025-04-03 PROCEDURE — 80053 COMPREHEN METABOLIC PANEL: CPT

## 2025-04-08 ENCOUNTER — OFFICE VISIT (OUTPATIENT)
Dept: FAMILY MEDICINE CLINIC | Age: 70
End: 2025-04-08
Payer: MEDICARE

## 2025-04-08 VITALS
HEART RATE: 74 BPM | HEIGHT: 66 IN | OXYGEN SATURATION: 99 % | WEIGHT: 190.6 LBS | DIASTOLIC BLOOD PRESSURE: 76 MMHG | BODY MASS INDEX: 30.63 KG/M2 | SYSTOLIC BLOOD PRESSURE: 120 MMHG | RESPIRATION RATE: 16 BRPM

## 2025-04-08 DIAGNOSIS — I25.10 CORONARY ARTERY DISEASE INVOLVING NATIVE CORONARY ARTERY OF NATIVE HEART WITHOUT ANGINA PECTORIS: ICD-10-CM

## 2025-04-08 DIAGNOSIS — N20.0 KIDNEY STONE: ICD-10-CM

## 2025-04-08 DIAGNOSIS — C64.1 RENAL CELL CARCINOMA OF RIGHT KIDNEY: ICD-10-CM

## 2025-04-08 DIAGNOSIS — E11.00 TYPE 2 DIABETES MELLITUS WITH HYPEROSMOLARITY WITHOUT COMA, UNSPECIFIED WHETHER LONG TERM INSULIN USE (HCC): Primary | ICD-10-CM

## 2025-04-08 DIAGNOSIS — E78.2 MIXED HYPERLIPIDEMIA: ICD-10-CM

## 2025-04-08 DIAGNOSIS — I10 PRIMARY HYPERTENSION: ICD-10-CM

## 2025-04-08 DIAGNOSIS — K21.9 GASTROESOPHAGEAL REFLUX DISEASE WITHOUT ESOPHAGITIS: ICD-10-CM

## 2025-04-08 PROCEDURE — 1090F PRES/ABSN URINE INCON ASSESS: CPT | Performed by: FAMILY MEDICINE

## 2025-04-08 PROCEDURE — 3044F HG A1C LEVEL LT 7.0%: CPT | Performed by: FAMILY MEDICINE

## 2025-04-08 PROCEDURE — 3078F DIAST BP <80 MM HG: CPT | Performed by: FAMILY MEDICINE

## 2025-04-08 PROCEDURE — 3074F SYST BP LT 130 MM HG: CPT | Performed by: FAMILY MEDICINE

## 2025-04-08 PROCEDURE — 2022F DILAT RTA XM EVC RTNOPTHY: CPT | Performed by: FAMILY MEDICINE

## 2025-04-08 PROCEDURE — 99214 OFFICE O/P EST MOD 30 MIN: CPT | Performed by: FAMILY MEDICINE

## 2025-04-08 PROCEDURE — 3017F COLORECTAL CA SCREEN DOC REV: CPT | Performed by: FAMILY MEDICINE

## 2025-04-08 PROCEDURE — G8417 CALC BMI ABV UP PARAM F/U: HCPCS | Performed by: FAMILY MEDICINE

## 2025-04-08 PROCEDURE — 1159F MED LIST DOCD IN RCRD: CPT | Performed by: FAMILY MEDICINE

## 2025-04-08 PROCEDURE — G9708 BILAT MAST/HX BI /UNILAT MAS: HCPCS | Performed by: FAMILY MEDICINE

## 2025-04-08 PROCEDURE — 99213 OFFICE O/P EST LOW 20 MIN: CPT | Performed by: FAMILY MEDICINE

## 2025-04-08 PROCEDURE — 1036F TOBACCO NON-USER: CPT | Performed by: FAMILY MEDICINE

## 2025-04-08 PROCEDURE — 1123F ACP DISCUSS/DSCN MKR DOCD: CPT | Performed by: FAMILY MEDICINE

## 2025-04-08 PROCEDURE — G8427 DOCREV CUR MEDS BY ELIG CLIN: HCPCS | Performed by: FAMILY MEDICINE

## 2025-04-08 PROCEDURE — G8399 PT W/DXA RESULTS DOCUMENT: HCPCS | Performed by: FAMILY MEDICINE

## 2025-04-08 RX ORDER — BACLOFEN 10 MG/1
10 TABLET ORAL 3 TIMES DAILY PRN
Qty: 90 TABLET | Refills: 6 | Status: SHIPPED | OUTPATIENT
Start: 2025-04-08

## 2025-04-08 SDOH — ECONOMIC STABILITY: FOOD INSECURITY: WITHIN THE PAST 12 MONTHS, YOU WORRIED THAT YOUR FOOD WOULD RUN OUT BEFORE YOU GOT MONEY TO BUY MORE.: NEVER TRUE

## 2025-04-08 SDOH — ECONOMIC STABILITY: FOOD INSECURITY: WITHIN THE PAST 12 MONTHS, THE FOOD YOU BOUGHT JUST DIDN'T LAST AND YOU DIDN'T HAVE MONEY TO GET MORE.: NEVER TRUE

## 2025-04-08 ASSESSMENT — PATIENT HEALTH QUESTIONNAIRE - PHQ9
SUM OF ALL RESPONSES TO PHQ QUESTIONS 1-9: 0
1. LITTLE INTEREST OR PLEASURE IN DOING THINGS: NOT AT ALL
SUM OF ALL RESPONSES TO PHQ QUESTIONS 1-9: 0
SUM OF ALL RESPONSES TO PHQ QUESTIONS 1-9: 0
2. FEELING DOWN, DEPRESSED OR HOPELESS: NOT AT ALL
SUM OF ALL RESPONSES TO PHQ QUESTIONS 1-9: 0

## 2025-04-08 ASSESSMENT — ENCOUNTER SYMPTOMS
EYES NEGATIVE: 1
RESPIRATORY NEGATIVE: 1
NAUSEA: 0
ALLERGIC/IMMUNOLOGIC NEGATIVE: 1
BACK PAIN: 1
CONSTIPATION: 0

## 2025-04-08 NOTE — PATIENT INSTRUCTIONS
- 47.1 % Final    MCV 04/03/2025 84.0  82.6 - 102.9 fL Final    MCH 04/03/2025 28.4  25.2 - 33.5 pg Final    MCHC 04/03/2025 33.8 (H)  25.2 - 33.5 g/dL Final    RDW 04/03/2025 14.2  11.8 - 14.4 % Final    Platelets 04/03/2025 275  138 - 453 k/uL Final    MPV 04/03/2025 9.8  8.1 - 13.5 fL Final    NRBC Automated 04/03/2025 0.0  0.0 per 100 WBC Final    Neutrophils % 04/03/2025 59  36 - 65 % Final    Lymphocytes % 04/03/2025 31  24 - 43 % Final    Monocytes % 04/03/2025 6  3 - 12 % Final    Eosinophils % 04/03/2025 2  1 - 4 % Final    Basophils % 04/03/2025 1  0 - 2 % Final    Immature Granulocytes % 04/03/2025 1 (H)  0 % Final    Neutrophils Absolute 04/03/2025 5.27  1.50 - 8.10 k/uL Final    Lymphocytes Absolute 04/03/2025 2.71  1.10 - 3.70 k/uL Final    Monocytes Absolute 04/03/2025 0.50  0.10 - 1.20 k/uL Final    Eosinophils Absolute 04/03/2025 0.17  0.00 - 0.44 k/uL Final    Basophils Absolute 04/03/2025 0.04  0.00 - 0.20 k/uL Final    Immature Granulocytes Absolute 04/03/2025 0.05  0.00 - 0.30 k/uL Final    Cholesterol, Total 04/03/2025 113  0 - 199 mg/dL Final    Comment:    Cholesterol Guidelines:      <200  Desirable   200-240  Borderline      >240  Undesirable         HDL 04/03/2025 45  >40 mg/dL Final    Comment:    HDL Guidelines:    <40     Undesirable   40-59    Borderline    >59     Desirable         LDL Cholesterol 04/03/2025 49  0 - 100 mg/dL Final    Comment:    LDL Guidelines:     <100    Desirable   100-129   Near to/above Desirable   130-159   Borderline      >159   Undesirable     Direct (measured) LDL and calculated LDL are not interchangeable tests.      Chol/HDL Ratio 04/03/2025 2.5   Final    Triglycerides 04/03/2025 93  <150 mg/dL Final    Comment:    Triglyceride Guidelines:     <150   Desirable   150-199  Borderline   200-499  High     >499   Very high   Based on AHA Guidelines for fasting triglyceride, October 2012.         VLDL 04/03/2025 19  1 - 30 mg/dL Final

## 2025-04-08 NOTE — PROGRESS NOTES
added more recently.  Cont. Current dosing.     Gerd; fairly quiescent at present.  Cont. Omeprazole.    Cad; quiescent .  Taking imdur daily.  Cont. Coreg and statin and asa.  No ntg use.  Cont. Routine cardiology follow up as directed.  She has some intermittent left chest pain described as sharp in nature.  Some lower back spasms around the L1 area grossly.      Some hip pain with extended walking .  Both hips limiting ambulation. Some left groin pain at times.     Renal cell carcinoma. Dr. Diaz retired.  Planning to follow with her oncologist now dr. Lynch. .  .  No evidence of recurrence.  Planning PET scan every other year.    On allopurinol for uric acid stones.  None in awhile.  Cont. Same.      Colonoscopy may 2017.  Diverticulosis.  Follow up in 10 yrs recommended at that time.      Some bilateral hip pain recently.  Gluteal/posterior most of the time.  Some left groin pain with prolonged ambulation, suspicious for osteoarthritis .      Some spasms lower thoracic/upper lumbar back area.  Adding baclofen prn.  Cautioned.      An electronic signature was used to authenticate this note.    --Roverto Clemons MD on 4/8/2025 at 8:33 AM

## 2025-04-14 SDOH — HEALTH STABILITY: PHYSICAL HEALTH: ON AVERAGE, HOW MANY MINUTES DO YOU ENGAGE IN EXERCISE AT THIS LEVEL?: 10 MIN

## 2025-04-14 SDOH — HEALTH STABILITY: PHYSICAL HEALTH: ON AVERAGE, HOW MANY DAYS PER WEEK DO YOU ENGAGE IN MODERATE TO STRENUOUS EXERCISE (LIKE A BRISK WALK)?: 2 DAYS

## 2025-04-14 ASSESSMENT — LIFESTYLE VARIABLES
HOW OFTEN DO YOU HAVE A DRINK CONTAINING ALCOHOL: 1
HOW MANY STANDARD DRINKS CONTAINING ALCOHOL DO YOU HAVE ON A TYPICAL DAY: 0
HOW OFTEN DO YOU HAVE A DRINK CONTAINING ALCOHOL: NEVER
HOW MANY STANDARD DRINKS CONTAINING ALCOHOL DO YOU HAVE ON A TYPICAL DAY: PATIENT DOES NOT DRINK
HOW OFTEN DO YOU HAVE SIX OR MORE DRINKS ON ONE OCCASION: 1

## 2025-04-14 ASSESSMENT — PATIENT HEALTH QUESTIONNAIRE - PHQ9
SUM OF ALL RESPONSES TO PHQ QUESTIONS 1-9: 0
1. LITTLE INTEREST OR PLEASURE IN DOING THINGS: NOT AT ALL
SUM OF ALL RESPONSES TO PHQ QUESTIONS 1-9: 0
2. FEELING DOWN, DEPRESSED OR HOPELESS: NOT AT ALL

## 2025-04-16 ENCOUNTER — TELEMEDICINE (OUTPATIENT)
Dept: FAMILY MEDICINE CLINIC | Age: 70
End: 2025-04-16
Payer: MEDICARE

## 2025-04-16 DIAGNOSIS — Z00.00 MEDICARE ANNUAL WELLNESS VISIT, SUBSEQUENT: Primary | ICD-10-CM

## 2025-04-16 PROCEDURE — 3017F COLORECTAL CA SCREEN DOC REV: CPT | Performed by: FAMILY MEDICINE

## 2025-04-16 PROCEDURE — G0439 PPPS, SUBSEQ VISIT: HCPCS | Performed by: FAMILY MEDICINE

## 2025-04-16 PROCEDURE — 1159F MED LIST DOCD IN RCRD: CPT | Performed by: FAMILY MEDICINE

## 2025-04-16 PROCEDURE — 1123F ACP DISCUSS/DSCN MKR DOCD: CPT | Performed by: FAMILY MEDICINE

## 2025-05-09 ENCOUNTER — HOSPITAL ENCOUNTER (OUTPATIENT)
Dept: GENERAL RADIOLOGY | Age: 70
Discharge: HOME | End: 2025-05-11
Payer: MEDICARE

## 2025-05-09 ENCOUNTER — OFFICE VISIT (OUTPATIENT)
Dept: PRIMARY CARE CLINIC | Age: 70
End: 2025-05-09
Payer: MEDICARE

## 2025-05-09 ENCOUNTER — HOSPITAL ENCOUNTER (OUTPATIENT)
Age: 70
Discharge: HOME | End: 2025-05-09
Payer: MEDICARE

## 2025-05-09 ENCOUNTER — RESULTS FOLLOW-UP (OUTPATIENT)
Dept: PRIMARY CARE CLINIC | Age: 70
End: 2025-05-09

## 2025-05-09 VITALS
OXYGEN SATURATION: 98 % | WEIGHT: 190 LBS | SYSTOLIC BLOOD PRESSURE: 110 MMHG | BODY MASS INDEX: 29.82 KG/M2 | HEIGHT: 67 IN | TEMPERATURE: 97.8 F | HEART RATE: 78 BPM | DIASTOLIC BLOOD PRESSURE: 80 MMHG

## 2025-05-09 DIAGNOSIS — R10.9 FLANK PAIN: Primary | ICD-10-CM

## 2025-05-09 DIAGNOSIS — R10.9 FLANK PAIN: ICD-10-CM

## 2025-05-09 DIAGNOSIS — M79.10 MYALGIA: ICD-10-CM

## 2025-05-09 LAB
BILIRUB UR QL STRIP: NEGATIVE
CLARITY UR: CLEAR
COLOR UR: YELLOW
COMMENT: ABNORMAL
GLUCOSE UR STRIP-MCNC: NEGATIVE MG/DL
HGB UR QL STRIP.AUTO: NEGATIVE
KETONES UR STRIP-MCNC: NEGATIVE MG/DL
LEUKOCYTE ESTERASE UR QL STRIP: NEGATIVE
NITRITE UR QL STRIP: NEGATIVE
PH UR STRIP: 5.5 [PH] (ref 5–6)
PROT UR STRIP-MCNC: NEGATIVE MG/DL
SP GR UR STRIP: 1.03 (ref 1.01–1.02)
UROBILINOGEN UR STRIP-ACNC: NORMAL EU/DL (ref 0–1)

## 2025-05-09 PROCEDURE — PBSHW PBB SHADOW CHARGE: Performed by: FAMILY MEDICINE

## 2025-05-09 PROCEDURE — G8399 PT W/DXA RESULTS DOCUMENT: HCPCS | Performed by: FAMILY MEDICINE

## 2025-05-09 PROCEDURE — 3017F COLORECTAL CA SCREEN DOC REV: CPT | Performed by: FAMILY MEDICINE

## 2025-05-09 PROCEDURE — G8417 CALC BMI ABV UP PARAM F/U: HCPCS | Performed by: FAMILY MEDICINE

## 2025-05-09 PROCEDURE — 81003 URINALYSIS AUTO W/O SCOPE: CPT

## 2025-05-09 PROCEDURE — 3074F SYST BP LT 130 MM HG: CPT | Performed by: FAMILY MEDICINE

## 2025-05-09 PROCEDURE — 1123F ACP DISCUSS/DSCN MKR DOCD: CPT | Performed by: FAMILY MEDICINE

## 2025-05-09 PROCEDURE — G8427 DOCREV CUR MEDS BY ELIG CLIN: HCPCS | Performed by: FAMILY MEDICINE

## 2025-05-09 PROCEDURE — 3079F DIAST BP 80-89 MM HG: CPT | Performed by: FAMILY MEDICINE

## 2025-05-09 PROCEDURE — G9708 BILAT MAST/HX BI /UNILAT MAS: HCPCS | Performed by: FAMILY MEDICINE

## 2025-05-09 PROCEDURE — 1036F TOBACCO NON-USER: CPT | Performed by: FAMILY MEDICINE

## 2025-05-09 PROCEDURE — 20552 NJX 1/MLT TRIGGER POINT 1/2: CPT | Performed by: FAMILY MEDICINE

## 2025-05-09 PROCEDURE — 74018 RADEX ABDOMEN 1 VIEW: CPT

## 2025-05-09 PROCEDURE — 1159F MED LIST DOCD IN RCRD: CPT | Performed by: FAMILY MEDICINE

## 2025-05-09 PROCEDURE — 99214 OFFICE O/P EST MOD 30 MIN: CPT | Performed by: FAMILY MEDICINE

## 2025-05-09 PROCEDURE — 1090F PRES/ABSN URINE INCON ASSESS: CPT | Performed by: FAMILY MEDICINE

## 2025-05-09 RX ORDER — TRIAMCINOLONE ACETONIDE 40 MG/ML
40 INJECTION, SUSPENSION INTRA-ARTICULAR; INTRAMUSCULAR ONCE
Status: COMPLETED | OUTPATIENT
Start: 2025-05-09 | End: 2025-05-09

## 2025-05-09 RX ORDER — LIDOCAINE HYDROCHLORIDE 10 MG/ML
2 INJECTION, SOLUTION EPIDURAL; INFILTRATION; INTRACAUDAL; PERINEURAL ONCE
Status: COMPLETED | OUTPATIENT
Start: 2025-05-09 | End: 2025-05-09

## 2025-05-09 RX ADMIN — LIDOCAINE HYDROCHLORIDE 2 ML: 10 INJECTION, SOLUTION EPIDURAL; INFILTRATION; INTRACAUDAL; PERINEURAL at 17:37

## 2025-05-09 RX ADMIN — TRIAMCINOLONE ACETONIDE 40 MG: 40 INJECTION, SUSPENSION INTRA-ARTICULAR; INTRAMUSCULAR at 17:37

## 2025-05-15 ASSESSMENT — ENCOUNTER SYMPTOMS
DIARRHEA: 0
NAUSEA: 0
CONSTIPATION: 0
BACK PAIN: 1
ABDOMINAL PAIN: 0
VOMITING: 0

## 2025-05-15 NOTE — PROGRESS NOTES
2025     Nasrin Vazquez (:  1955) is a 69 y.o. female, here for evaluation of the following medical concerns:    Back Pain  Pertinent negatives include no abdominal pain, dysuria or fever.     History of Present Illness  The patient is a 69-year-old female who presents for evaluation of right-sided back pain.    She began experiencing mild back pain on Wednesday night, which escalated to a severity of 10 out of 10 on Thursday. Today, the pain has slightly subsided to a level of 7 or 8. She reports a palpable lump in the affected area. The pain intensifies with movement, but she does not experience any radiating leg pain. She has no history of chronic back issues and reports no recent activities that could have strained her back. She also reports no urinary symptoms such as burning or frequency, and no systemic symptoms like fever or chills. She reports no gastrointestinal symptoms such as nausea, vomiting, diarrhea, or constipation.     She has a history of kidney stones, but the current pain is described as different from previous episodes. She has a partial kidney and has undergone procedures for kidney stones in the past. Despite taking a muscle relaxer yesterday, she found no relief.    Did review patient's med list, allergies, social history,pmhx and pshx today as noted in the record.      Review of Systems   Constitutional:  Negative for chills, fatigue and fever.   Gastrointestinal:  Negative for abdominal pain, constipation, diarrhea, nausea and vomiting.   Genitourinary:  Positive for flank pain. Negative for dysuria, frequency and urgency.   Musculoskeletal:  Positive for back pain and myalgias.       Prior to Visit Medications    Medication Sig Taking? Authorizing Provider   baclofen (LIORESAL) 10 MG tablet Take 1 tablet by mouth 3 times daily as needed (SPASM) Yes Roverto Clemons MD   BRILINTA 90 MG TABS tablet TAKE 1 TABLET BY MOUTH TWICE  DAILY Yes Yane Ramirez, APRN - CNP

## 2025-05-15 NOTE — TELEPHONE ENCOUNTER
----- Message from Taylor Teixeira sent at 7/23/2021  3:18 PM EDT -----  Subject: Hospital Follow Up    QUESTIONS  What hospital was the Patient Discharged from? [facility]  Date of Discharge? 2021-07-23  Discharge Location? Home  Reason for hospitalization as patient stated? Abdominal pain. Kidney   stones  What question does the patient have, if applicable? none. I wanted to make   note that Arash Root from [facility] called to make the appt.  ---------------------------------------------------------------------------  --------------  CALL BACK INFO  What is the best way for the office to contact you? OK to leave message on   voicemail  Preferred Call Back Phone Number? [phone]  ---------------------------------------------------------------------------  --------------  SCRIPT ANSWERS  Relationship to Patient? Third Party  Representative Name? Arash Root  Appointment reason? Well Care/Follow Ups  Select a Well Care/Follow Ups appointment reason? Adult Hospital Follow Up   [Inpatient Discharge, [address]  (Patient requests to see provider urgently. )? No  (Has the patient been discharged from the hospital within 2 business days   AND does not have a Telephone Encounter  Follow Up From [address] [place] [address]   documented in [address]?)? Yes  Do you have any questions for your primary care provider that need to be   answered prior to your appointment? (Use RN Triage if question pertains to   anything on the red flag list)? No  (Patient needs follow up visit after hospital discharge) Book first   available appointment within 7 days OF DISCHARGE, if no appt, proceed to   book the next available time slot within 14 days OF DISCHARGE AND Send   Message to Provider. [address] Follow Up appointment cannot be booked   beyond 14 Days and should result in a Message to Provider. ?  Yes She need to keep follow up appointment because her appointment was canceled earlier this week on 5/12/2025 but it appears she had difficulty getting transportation.

## 2025-06-16 NOTE — TELEPHONE ENCOUNTER
Nasrin called requesting a refill of the below medication which has been pended for you:     Requested Prescriptions     Pending Prescriptions Disp Refills    hydroCHLOROthiazide 12.5 MG capsule [Pharmacy Med Name: hydroCHLOROthiazide 12.5 MG Oral Capsule] 90 capsule 0     Sig: TAKE 1 CAPSULE BY MOUTH IN THE  MORNING       Last Appointment Date: 4/16/2025  Next Appointment Date: 10/8/2025

## 2025-06-30 RX ORDER — HYDROCHLOROTHIAZIDE 12.5 MG/1
12.5 CAPSULE ORAL EVERY MORNING
Qty: 90 CAPSULE | Refills: 0 | Status: SHIPPED | OUTPATIENT
Start: 2025-06-30

## 2025-07-28 ENCOUNTER — OFFICE VISIT (OUTPATIENT)
Dept: CARDIOLOGY | Age: 70
End: 2025-07-28
Payer: MEDICARE

## 2025-07-28 VITALS
HEART RATE: 74 BPM | BODY MASS INDEX: 28.88 KG/M2 | OXYGEN SATURATION: 95 % | RESPIRATION RATE: 14 BRPM | WEIGHT: 184 LBS | DIASTOLIC BLOOD PRESSURE: 60 MMHG | HEIGHT: 67 IN | SYSTOLIC BLOOD PRESSURE: 114 MMHG

## 2025-07-28 DIAGNOSIS — Z98.61 CORONARY ANGIOPLASTY STATUS: ICD-10-CM

## 2025-07-28 DIAGNOSIS — I25.84 CORONARY ARTERY DISEASE DUE TO CALCIFIED CORONARY LESION: ICD-10-CM

## 2025-07-28 DIAGNOSIS — I10 ESSENTIAL HYPERTENSION: ICD-10-CM

## 2025-07-28 DIAGNOSIS — I25.10 CORONARY ARTERY DISEASE DUE TO CALCIFIED CORONARY LESION: ICD-10-CM

## 2025-07-28 DIAGNOSIS — E78.2 MIXED HYPERLIPIDEMIA: ICD-10-CM

## 2025-07-28 DIAGNOSIS — I25.10 CORONARY ARTERY DISEASE INVOLVING NATIVE CORONARY ARTERY OF NATIVE HEART WITHOUT ANGINA PECTORIS: Primary | ICD-10-CM

## 2025-07-28 DIAGNOSIS — I25.10 ATHEROSCLEROSIS OF NATIVE CORONARY ARTERY OF NATIVE HEART WITHOUT ANGINA PECTORIS: ICD-10-CM

## 2025-07-28 DIAGNOSIS — R94.31 ABNORMAL ECG: ICD-10-CM

## 2025-07-28 PROCEDURE — 1090F PRES/ABSN URINE INCON ASSESS: CPT | Performed by: INTERNAL MEDICINE

## 2025-07-28 PROCEDURE — G8417 CALC BMI ABV UP PARAM F/U: HCPCS | Performed by: INTERNAL MEDICINE

## 2025-07-28 PROCEDURE — 93010 ELECTROCARDIOGRAM REPORT: CPT | Performed by: INTERNAL MEDICINE

## 2025-07-28 PROCEDURE — G9708 BILAT MAST/HX BI /UNILAT MAS: HCPCS | Performed by: INTERNAL MEDICINE

## 2025-07-28 PROCEDURE — 99212 OFFICE O/P EST SF 10 MIN: CPT | Performed by: INTERNAL MEDICINE

## 2025-07-28 PROCEDURE — 93005 ELECTROCARDIOGRAM TRACING: CPT | Performed by: INTERNAL MEDICINE

## 2025-07-28 PROCEDURE — G8399 PT W/DXA RESULTS DOCUMENT: HCPCS | Performed by: INTERNAL MEDICINE

## 2025-07-28 PROCEDURE — 3074F SYST BP LT 130 MM HG: CPT | Performed by: INTERNAL MEDICINE

## 2025-07-28 PROCEDURE — 1036F TOBACCO NON-USER: CPT | Performed by: INTERNAL MEDICINE

## 2025-07-28 PROCEDURE — 3078F DIAST BP <80 MM HG: CPT | Performed by: INTERNAL MEDICINE

## 2025-07-28 PROCEDURE — 99214 OFFICE O/P EST MOD 30 MIN: CPT | Performed by: INTERNAL MEDICINE

## 2025-07-28 PROCEDURE — 1123F ACP DISCUSS/DSCN MKR DOCD: CPT | Performed by: INTERNAL MEDICINE

## 2025-07-28 PROCEDURE — G8427 DOCREV CUR MEDS BY ELIG CLIN: HCPCS | Performed by: INTERNAL MEDICINE

## 2025-07-28 PROCEDURE — 1159F MED LIST DOCD IN RCRD: CPT | Performed by: INTERNAL MEDICINE

## 2025-07-28 PROCEDURE — 3017F COLORECTAL CA SCREEN DOC REV: CPT | Performed by: INTERNAL MEDICINE

## 2025-07-28 RX ORDER — ASPIRIN 81 MG/1
81 TABLET ORAL DAILY
COMMUNITY

## (undated) DEVICE — 3M™ IOBAN™ 2 ANTIMICROBIAL INCISE DRAPE 6650EZ: Brand: IOBAN™ 2

## (undated) DEVICE — NEEDLE SPNL 18GA L3.5IN W/ QNCKE SHARPER BVL DURA CLICK

## (undated) DEVICE — BIT 6972118 RAIL CUTTER 1.5MM DIAMOND

## (undated) DEVICE — SPONGE LAP W18XL18IN WHT COT 4 PLY FLD STRUNG RADPQ DISP ST

## (undated) DEVICE — CODMAN® SURGICAL PATTIES 1/2" X 1/2" (1.27CM X 1.27CM): Brand: CODMAN®

## (undated) DEVICE — GAUZE,SPONGE,FLUFF,6"X6.75",STRL,5/TRAY: Brand: MEDLINE

## (undated) DEVICE — DRAPE,REIN 53X77,STERILE: Brand: MEDLINE

## (undated) DEVICE — PACK PROCEDURE SURG LUMBAR SPINE SVMMC

## (undated) DEVICE — SYRINGE, LUER LOCK, 10ML: Brand: MEDLINE

## (undated) DEVICE — GOWN,AURORA,NONRNF,XL,30/CS: Brand: MEDLINE

## (undated) DEVICE — DRAPE MICSCP W117XL305CM DIA65MM LENS W VARI LENS2 FOR LEICA

## (undated) DEVICE — TOTAL TRAY, 16FR 10ML SIL FOLEY, URN: Brand: MEDLINE

## (undated) DEVICE — CONNECTOR TBNG WHT PLAS SUCT STR 5IN1 LTWT W/ M CONN

## (undated) DEVICE — SUTURE MCRYL SZ 4-0 L18IN ABSRB UD L16MM PC-3 3/8 CIR PRIM Y845G

## (undated) DEVICE — DIFFUSER: Brand: CORE, MAESTRO

## (undated) DEVICE — ADHESIVE SKIN CLSR 0.7ML TOP DERMBND ADV

## (undated) DEVICE — CODMAN® SURGICAL PATTIES 1/2" X 3" (1.27CM X 7.62CM): Brand: CODMAN®

## (undated) DEVICE — 3.0MM PRECISION NEURO (MATCH HEAD)

## (undated) DEVICE — C-ARMOR C-ARM EQUIPMENT COVERS CLEAR STERILE UNIVERSAL FIT 12 PER CASE: Brand: C-ARMOR

## (undated) DEVICE — CONTROL SYRINGE LUER-LOCK TIP: Brand: MONOJECT

## (undated) DEVICE — COVER,MAYO STAND,STERILE: Brand: MEDLINE

## (undated) DEVICE — PEN: MARKING STD 100/CS: Brand: MEDICAL ACTION INDUSTRIES

## (undated) DEVICE — MERCY DEFIANCE ENDO KIT: Brand: MEDLINE INDUSTRIES, INC.

## (undated) DEVICE — CO2 CANNULA,SSOFT,ADLT,7O2,4CO2,FEMALE: Brand: MEDLINE

## (undated) DEVICE — STANDARD HYPODERMIC NEEDLE,POLYPROPYLENE HUB: Brand: MONOJECT

## (undated) DEVICE — EMESIS BASIN: Brand: DEROYAL

## (undated) DEVICE — BLADE ES L4IN INSUL EDGE

## (undated) DEVICE — TOWEL,OR,DSP,ST,NATURAL,DLX,4/PK,20PK/CS: Brand: MEDLINE

## (undated) DEVICE — OIL CARTRIDGE: Brand: CORE, MAESTRO

## (undated) DEVICE — GLOVE ORANGE PI 7 1/2   MSG9075

## (undated) DEVICE — 4-PORT MANIFOLD: Brand: NEPTUNE 2

## (undated) DEVICE — DRAPE C ARM UNIV W41XL74IN CLR PLAS XR VELC CLSR POLY STRP

## (undated) DEVICE — Z DISCONTINUED APPLICATOR SURG PREP 0.35OZ 2% CHG 70% ISO ALC W/ HI LT

## (undated) DEVICE — ELECTRODE PT RET AD L9FT HI MOIST COND ADH HYDRGEL CORDED

## (undated) DEVICE — GARMENT,MEDLINE,DVT,INT,CALF,MED, GEN2: Brand: MEDLINE

## (undated) DEVICE — DRAPE THYROID

## (undated) DEVICE — SPONGE,PEANUT,XRAY,ST,SM,3/8",5/CARD: Brand: MEDLINE INDUSTRIES, INC.

## (undated) DEVICE — BLADE ES ELASTOMERIC COAT INSUL DURABLE BEND UPTO 90DEG

## (undated) DEVICE — GOWN,AURORA,NONREINFORCED,LARGE: Brand: MEDLINE